# Patient Record
Sex: FEMALE | Race: WHITE | NOT HISPANIC OR LATINO | URBAN - METROPOLITAN AREA
[De-identification: names, ages, dates, MRNs, and addresses within clinical notes are randomized per-mention and may not be internally consistent; named-entity substitution may affect disease eponyms.]

---

## 2018-04-24 ENCOUNTER — INPATIENT (INPATIENT)
Facility: HOSPITAL | Age: 83
LOS: 4 days | Discharge: NURSING, REHAB OR LONG-TERM CARE | End: 2018-04-29
Attending: INTERNAL MEDICINE | Admitting: INTERNAL MEDICINE
Payer: MEDICARE

## 2018-04-24 VITALS
RESPIRATION RATE: 18 BRPM | HEART RATE: 76 BPM | SYSTOLIC BLOOD PRESSURE: 204 MMHG | DIASTOLIC BLOOD PRESSURE: 74 MMHG | OXYGEN SATURATION: 87 % | TEMPERATURE: 98 F

## 2018-04-24 DIAGNOSIS — Z98.890 OTHER SPECIFIED POSTPROCEDURAL STATES: Chronic | ICD-10-CM

## 2018-04-24 LAB
ALBUMIN SERPL ELPH-MCNC: 4.6 G/DL — SIGNIFICANT CHANGE UP (ref 3.5–5.2)
ALP SERPL-CCNC: 115 U/L — SIGNIFICANT CHANGE UP (ref 30–115)
ALT FLD-CCNC: 10 U/L — SIGNIFICANT CHANGE UP (ref 0–41)
ANION GAP SERPL CALC-SCNC: 17 MMOL/L — HIGH (ref 7–14)
APTT BLD: 34 SEC — SIGNIFICANT CHANGE UP (ref 27–39.2)
AST SERPL-CCNC: 13 U/L — SIGNIFICANT CHANGE UP (ref 0–41)
BASE EXCESS BLDV CALC-SCNC: 0.2 MMOL/L — SIGNIFICANT CHANGE UP (ref -2–2)
BASOPHILS # BLD AUTO: 0.02 K/UL — SIGNIFICANT CHANGE UP (ref 0–0.2)
BASOPHILS NFR BLD AUTO: 0.2 % — SIGNIFICANT CHANGE UP (ref 0–1)
BILIRUB SERPL-MCNC: 0.5 MG/DL — SIGNIFICANT CHANGE UP (ref 0.2–1.2)
BUN SERPL-MCNC: 20 MG/DL — SIGNIFICANT CHANGE UP (ref 10–20)
CALCIUM SERPL-MCNC: 9.9 MG/DL — SIGNIFICANT CHANGE UP (ref 8.5–10.1)
CHLORIDE SERPL-SCNC: 99 MMOL/L — SIGNIFICANT CHANGE UP (ref 98–110)
CK SERPL-CCNC: 44 U/L — SIGNIFICANT CHANGE UP (ref 0–225)
CO2 SERPL-SCNC: 23 MMOL/L — SIGNIFICANT CHANGE UP (ref 17–32)
CREAT SERPL-MCNC: 0.6 MG/DL — LOW (ref 0.7–1.5)
EOSINOPHIL # BLD AUTO: 0.05 K/UL — SIGNIFICANT CHANGE UP (ref 0–0.7)
EOSINOPHIL NFR BLD AUTO: 0.5 % — SIGNIFICANT CHANGE UP (ref 0–8)
GLUCOSE SERPL-MCNC: 229 MG/DL — HIGH (ref 70–99)
HCO3 BLDV-SCNC: 26 MMOL/L — SIGNIFICANT CHANGE UP (ref 22–29)
HCT VFR BLD CALC: 42.2 % — SIGNIFICANT CHANGE UP (ref 37–47)
HGB BLD-MCNC: 13.8 G/DL — SIGNIFICANT CHANGE UP (ref 12–16)
HOROWITZ INDEX BLDV+IHG-RTO: 21 — SIGNIFICANT CHANGE UP
IMM GRANULOCYTES NFR BLD AUTO: 1.1 % — HIGH (ref 0.1–0.3)
INR BLD: 1.43 RATIO — HIGH (ref 0.65–1.3)
LACTATE BLDV-MCNC: 2.5 MMOL/L — HIGH (ref 0.5–1.6)
LYMPHOCYTES # BLD AUTO: 1.05 K/UL — LOW (ref 1.2–3.4)
LYMPHOCYTES # BLD AUTO: 11.1 % — LOW (ref 20.5–51.1)
MCHC RBC-ENTMCNC: 30.8 PG — SIGNIFICANT CHANGE UP (ref 27–31)
MCHC RBC-ENTMCNC: 32.7 G/DL — SIGNIFICANT CHANGE UP (ref 32–37)
MCV RBC AUTO: 94.2 FL — SIGNIFICANT CHANGE UP (ref 81–99)
MONOCYTES # BLD AUTO: 0.87 K/UL — HIGH (ref 0.1–0.6)
MONOCYTES NFR BLD AUTO: 9.2 % — SIGNIFICANT CHANGE UP (ref 1.7–9.3)
NEUTROPHILS # BLD AUTO: 7.41 K/UL — HIGH (ref 1.4–6.5)
NEUTROPHILS NFR BLD AUTO: 77.9 % — HIGH (ref 42.2–75.2)
NRBC # BLD: 0 /100 WBCS — SIGNIFICANT CHANGE UP (ref 0–0)
NT-PROBNP SERPL-SCNC: 353 PG/ML — HIGH (ref 0–300)
PCO2 BLDV: 46 MMHG — SIGNIFICANT CHANGE UP (ref 41–51)
PH BLDV: 7.37 — SIGNIFICANT CHANGE UP (ref 7.26–7.43)
PLATELET # BLD AUTO: 274 K/UL — SIGNIFICANT CHANGE UP (ref 130–400)
PO2 BLDV: 32 MMHG — SIGNIFICANT CHANGE UP (ref 20–40)
POTASSIUM SERPL-MCNC: 5 MMOL/L — SIGNIFICANT CHANGE UP (ref 3.5–5)
POTASSIUM SERPL-SCNC: 5 MMOL/L — SIGNIFICANT CHANGE UP (ref 3.5–5)
PROT SERPL-MCNC: 7 G/DL — SIGNIFICANT CHANGE UP (ref 6–8)
PROTHROM AB SERPL-ACNC: 15.7 SEC — HIGH (ref 9.95–12.87)
RBC # BLD: 4.48 M/UL — SIGNIFICANT CHANGE UP (ref 4.2–5.4)
RBC # FLD: 13.2 % — SIGNIFICANT CHANGE UP (ref 11.5–14.5)
SAO2 % BLDV: 57 % — SIGNIFICANT CHANGE UP
SODIUM SERPL-SCNC: 139 MMOL/L — SIGNIFICANT CHANGE UP (ref 135–146)
TROPONIN T SERPL-MCNC: <0.01 NG/ML — SIGNIFICANT CHANGE UP
WBC # BLD: 9.5 K/UL — SIGNIFICANT CHANGE UP (ref 4.8–10.8)
WBC # FLD AUTO: 9.5 K/UL — SIGNIFICANT CHANGE UP (ref 4.8–10.8)

## 2018-04-24 PROCEDURE — 93970 EXTREMITY STUDY: CPT | Mod: 26

## 2018-04-24 RX ORDER — MORPHINE SULFATE 50 MG/1
2 CAPSULE, EXTENDED RELEASE ORAL ONCE
Qty: 0 | Refills: 0 | Status: DISCONTINUED | OUTPATIENT
Start: 2018-04-24 | End: 2018-04-24

## 2018-04-24 RX ORDER — SODIUM CHLORIDE 9 MG/ML
3 INJECTION INTRAMUSCULAR; INTRAVENOUS; SUBCUTANEOUS ONCE
Qty: 0 | Refills: 0 | Status: COMPLETED | OUTPATIENT
Start: 2018-04-24 | End: 2018-04-24

## 2018-04-24 RX ADMIN — SODIUM CHLORIDE 3 MILLILITER(S): 9 INJECTION INTRAMUSCULAR; INTRAVENOUS; SUBCUTANEOUS at 19:28

## 2018-04-24 RX ADMIN — MORPHINE SULFATE 2 MILLIGRAM(S): 50 CAPSULE, EXTENDED RELEASE ORAL at 19:47

## 2018-04-24 NOTE — ED PROVIDER NOTE - PROGRESS NOTE DETAILS
signed out to Dr Hill, f/u CT scan and dispo Pt  unable to walk - CT hip negative for fracture -  vance dmit for rehab -

## 2018-04-24 NOTE — ED PROVIDER NOTE - PHYSICAL EXAMINATION
Vital Signs: I have reviewed the initial vital signs.  Constitutional: well-nourished, no acute distress, normocephalic  Eyes: PERRLA, EOMI, no nystagmus, clear conjunctiva  ENT: MMM, TM b/l clear , no nasal congestion  Cardiovascular: Tachycardic, regular rhythm, no murmur appreciated  Respiratory: tachypneic   Gastrointestinal: soft, non-tender, non-distended  abdomen, no pulsatile mass  Musculoskeletal: supple neck, no lower extremity edema, no bony tenderness  Integumentary: warm, dry, no rash  Neurologic: awake, alert, cranial nerves II-XII grossly intact, extremities’ motor and sensory functions grossly intact, no focal deficits, GCS 15  Psychiatric: appropriate mood, appropriate affect Vital Signs: I have reviewed the initial vital signs.  Constitutional: well-nourished, no acute distress, normocephalic  Eyes: PERRLA, EOMI, no nystagmus, clear conjunctiva  ENT: MMM, TM b/l clear , no nasal congestion  Cardiovascular: Tachycardic, regular rhythm, no murmur appreciated  Respiratory: tachypneic   Gastrointestinal: soft, non-tender, non-distended  abdomen, no pulsatile mass  Musculoskeletal: supple neck, no lower extremity edema, +tenderness to left hip with decreased rom / no crepitation / good peripheral pulses   Integumentary: warm, dry, no rash  Neurologic: awake, alert, cranial nerves II-XII grossly intact, extremities’ motor and sensory functions grossly intact, no focal deficits, GCS 15  Psychiatric: appropriate mood, appropriate affect

## 2018-04-24 NOTE — ED PROVIDER NOTE - CARE PLAN
Principal Discharge DX:	Fall  Secondary Diagnosis:	Hip pain, acute, left  Secondary Diagnosis:	SOB (shortness of breath)

## 2018-04-24 NOTE — ED PROVIDER NOTE - ATTENDING CONTRIBUTION TO CARE
89 yo F PMHx noted including left hip replacement, DM, HTN presents with c/o fall at home, c/o left leg pain and feeling SOB on arrival to ED,  did not hit head, no n/v, no CP,  PMD stopped Lasix last week. On exam pt in mild distress, AAO x 3, GCS 15, + tachypnea, + wheezing b/l R>L, abd is soft nt nd, + tender left hip with pain with ROM, + b/l LE edema left >right, + distal pulses

## 2018-04-24 NOTE — ED ADULT NURSE NOTE - OBJECTIVE STATEMENT
patient states she feel because she lost her balance while cleaning off her plate in her garbage. She turned around and lost her balance, Denies injury to head, NO LOC, fall was unwitnessed, patient called son with her cell phone and he assisted her off floor and called 911.

## 2018-04-24 NOTE — ED PROVIDER NOTE - OBJECTIVE STATEMENT
Pt comes in s/p fall. Pt states that while at home she lost her balance and fell, Pt now c/o left hip pain. Pt with a h/o a left hip replacement in the past. Pt states that Dr. Rubio also stopped her Lasix about1 week ago. Pt is also acutely SOB. Pt comes in s/p fall. Pt states that while at home she lost her balance and fell, Pt now c/o left hip pain. Pt with a h/o a left hip replacement in the past. Pt states that Dr. Rubio also stopped her Lasix about1 week ago. Pt is also acutely SOB. patient denies any  CP , neck or back pain or head injury

## 2018-04-24 NOTE — ED ADULT NURSE REASSESSMENT NOTE - NS ED NURSE REASSESS COMMENT FT1
Pt has been taken off BIPAP per MD.  She is comfortable at this time on RA with pulse ox of 95%.  Denies SOB.  Continuing to monitor.

## 2018-04-24 NOTE — ED PROVIDER NOTE - NS ED ROS FT
Review of Systems    Constitutional: (-) fever/ chills (-) weight loss  Eyes/ENT: (-) blurry vision, (-) epistaxis (-) sore throat (-) ear pain  Cardiovascular: (-) chest pain, (-) syncope (-) palpitations  Respiratory: (-) cough, (+) shortness of breath  Gastrointestinal: (-) vomiting, (-) diarrhea (-) abdominal pain  Musculoskeletal: (-) neck pain, (-) back pain, (+) joint pain (+) pedal edema   Integumentary: (-) rash, (-) swelling  Neurological: (-) headache, (-) altered mental status  Psychiatric: (-) hallucinations or depression   Allergic/Immunologic: (-) pruritus

## 2018-04-24 NOTE — ED PROVIDER NOTE - MEDICAL DECISION MAKING DETAILS
Pt given neb and placed on bipap with great improvement.  x rays reviewed and withut definite fracture.  Pt c/o persistent pain, will obtain Ct scan hip

## 2018-04-25 DIAGNOSIS — E11.9 TYPE 2 DIABETES MELLITUS WITHOUT COMPLICATIONS: ICD-10-CM
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
## 2018-04-25 DIAGNOSIS — R06.02 SHORTNESS OF BREATH: ICD-10-CM

## 2018-04-25 RX ORDER — ACETAMINOPHEN 500 MG
650 TABLET ORAL EVERY 6 HOURS
Qty: 0 | Refills: 0 | Status: DISCONTINUED | OUTPATIENT
Start: 2018-04-25 | End: 2018-04-29

## 2018-04-25 RX ORDER — METFORMIN HYDROCHLORIDE 850 MG/1
500 TABLET ORAL
Qty: 0 | Refills: 0 | Status: DISCONTINUED | OUTPATIENT
Start: 2018-04-25 | End: 2018-04-26

## 2018-04-25 RX ORDER — IPRATROPIUM/ALBUTEROL SULFATE 18-103MCG
3 AEROSOL WITH ADAPTER (GRAM) INHALATION EVERY 6 HOURS
Qty: 0 | Refills: 0 | Status: DISCONTINUED | OUTPATIENT
Start: 2018-04-25 | End: 2018-04-29

## 2018-04-25 RX ORDER — ENOXAPARIN SODIUM 100 MG/ML
30 INJECTION SUBCUTANEOUS EVERY 12 HOURS
Qty: 0 | Refills: 0 | Status: DISCONTINUED | OUTPATIENT
Start: 2018-04-25 | End: 2018-04-26

## 2018-04-25 RX ORDER — OXYCODONE HYDROCHLORIDE 5 MG/1
5 TABLET ORAL EVERY 4 HOURS
Qty: 0 | Refills: 0 | Status: DISCONTINUED | OUTPATIENT
Start: 2018-04-25 | End: 2018-04-29

## 2018-04-25 RX ADMIN — ENOXAPARIN SODIUM 30 MILLIGRAM(S): 100 INJECTION SUBCUTANEOUS at 17:38

## 2018-04-25 RX ADMIN — Medication 650 MILLIGRAM(S): at 15:07

## 2018-04-25 RX ADMIN — Medication 650 MILLIGRAM(S): at 08:35

## 2018-04-25 RX ADMIN — Medication 650 MILLIGRAM(S): at 14:37

## 2018-04-25 RX ADMIN — METFORMIN HYDROCHLORIDE 500 MILLIGRAM(S): 850 TABLET ORAL at 17:38

## 2018-04-25 RX ADMIN — METFORMIN HYDROCHLORIDE 500 MILLIGRAM(S): 850 TABLET ORAL at 06:35

## 2018-04-25 RX ADMIN — Medication 650 MILLIGRAM(S): at 09:07

## 2018-04-25 RX ADMIN — ENOXAPARIN SODIUM 30 MILLIGRAM(S): 100 INJECTION SUBCUTANEOUS at 06:35

## 2018-04-25 NOTE — CONSULT NOTE ADULT - ASSESSMENT
IMPRESSION: Rehab of 87 y/o  f  rehab  for  gait  dysfunction  lt  hip  pain oa     PRECAUTIONS: [  ] Cardiac  [  ] Respiratory  [  ] Seizures [  ] Contact Isolation  [  ] Droplet Isolation  [  ] Other    Weight Bearing Status:     RECOMMENDATION:    Out of Bed to Chair     DVT/Decubiti Prophylaxis    REHAB PLAN:     [x ] Bedside P/T 3-5 times a week   [   ]   Bedside O/T  2-3 times a week             [   ] No Rehab Therapy Indicated                   [   ]  Speech Therapy   Conditioning/ROM                                    ADL  Bed Mobility                                               Conditioning/ROM  Transfers                                                     Bed Mobility  Sitting /Standing Balance                         Transfers                                        Gait Training                                               Sitting/Standing Balance  Stair Training [   ]Applicable                    Home equipment Eval                                                                        Splinting  [   ] Only      GOALS:   ADL   [   x]   Independent                    Transfers  [  x ] Independent                          Ambulation  [   x] Independent     [  x  ] With device                            [   ]  CG                                                         [   ]  CG                                                                  [   ] CG                            [    ] Min A                                                   [   ] Min A                                                              [   ] Min  A          DISCHARGE PLAN:   [   ]  Good candidate for Intensive Rehabilitation/Hospital based-4A [facility]                                             Will tolerate 3hrs Intensive Rehab Daily                                       [  xx  ]  Short Term Rehab in Skilled Nursing Facility                                       [    ]  Home with Outpatient or VN services                                         [    ]  Possible Candidate for Intensive Hospital based Rehab
L superior ramus fx    wbat  rehab  pain control  fu with primary surgeon (kera) once discharged

## 2018-04-25 NOTE — PHYSICAL THERAPY INITIAL EVALUATION ADULT - SPECIFY REASON(S)
Chart reviewed. Patient currently pending orthopedic consult re: L hip hemiarthroplasty s/p fall p/w acute nondisplaced fx through left pubic body.  Will follow up and complete eval as appropriate

## 2018-04-25 NOTE — H&P ADULT - ASSESSMENT
89 yo F h/o Afib on Xarelto HTN DM high cholesterol s/p mechanical fall from standing, no injuries on xr or CT, unable ambulate    - admitted for Pt/Ot, rehab evaluation  - pain control  - DVT ppx       shortness of breath  - placed on bipap in ER, improved 89 yo F h/o Afib on Xarelto HTN DM high cholesterol s/p mechanical fall from standing, no injuries on xr, unable ambulate. CT noted for acute nondisplaced fracture through the left pubic body.     - admitted for Pt/Ot, rehab evaluation  - ortho consult   - pain control  - DVT ppx       shortness of breath  - placed on bipap in ER, improved

## 2018-04-25 NOTE — CONSULT NOTE ADULT - SUBJECTIVE AND OBJECTIVE BOX
HPI:  pt is an 87 yo F h/o Afib on Xarelto HTN DM high cholesterol s/p mechanical fall from standing. Pt states she lost her balance, No LOC or syncope, no head trauma. P/w L hip pain and inability to ambulate. Denies fever, chills, CP, N/V/D dysuria melena. Pt also short of breath at rest, received nebs in ER and placed on Bipap.     PTN  REFERRED TO ACUTE  REHAB  FOR  EVAL AND  TX   PAST MEDICAL & SURGICAL HISTORY:  High blood cholesterol  Hypertension  Diabetes mellitus, type 2  History of hip surgery      Hospital Course:    TODAY'S SUBJECTIVE & REVIEW OF SYMPTOMS:     Constitutional WNL   Cardio WNL   Resp WNL   GI WNL  Heme WNL  Endo WNL  Skin WNL  MSK WNL  Neuro WNL  Cognitive WNL  Psych WNL      MEDICATIONS  (STANDING):  enoxaparin Injectable 30 milliGRAM(s) SubCutaneous every 12 hours  metFORMIN 500 milliGRAM(s) Oral two times a day    MEDICATIONS  (PRN):  acetaminophen   Tablet. 650 milliGRAM(s) Oral every 6 hours PRN Mild Pain (1 - 3)  ALBUTerol/ipratropium for Nebulization 3 milliLiter(s) Nebulizer every 6 hours PRN Shortness of Breath and/or Wheezing  oxyCODONE    IR 5 milliGRAM(s) Oral every 4 hours PRN Moderate Pain (4 - 6)      FAMILY HISTORY:      Allergies    No Known Drug Allergies  Originally Entered as [RASH] reaction to [cashew nut] (Unknown)    Intolerances        SOCIAL HISTORY:    [  ] Etoh  [  ] Smoking  [  ] Substance abuse     Home Environment:  [  ] Home Alone  [  xx] Lives with Family SON  [  ] Home Health Aid    Dwelling:  [  ] Apartment  [ x Private House  [  ] Adult Home  [  ] Skilled Nursing Facility      [  ] Short Term  [  ] Long Term  [  ] Stairs    chair lefting    Elevator [  ]    FUNCTIONAL STATUS PTA: (Check all that apply)  Ambulation: [ x  ]Independent    [  ] Dependent     [  ] Non-Ambulatory  Assistive Device: [ x ] SA Cane  [  ]  Q Cane  [  x] Walker  [  ]  Wheelchair  ADL : [  x] Independent  [  ]  Dependent       Vital Signs Last 24 Hrs  T(C): 35.8 (25 Apr 2018 04:00), Max: 36.7 (25 Apr 2018 03:43)  T(F): 96.5 (25 Apr 2018 04:00), Max: 98 (25 Apr 2018 03:43)  HR: 72 (25 Apr 2018 04:00) (67 - 77)  BP: 129/58 (25 Apr 2018 04:00) (120/56 - 204/74)  BP(mean): --  RR: 18 (25 Apr 2018 04:00) (18 - 20)  SpO2: 96% (25 Apr 2018 08:38) (87% - 100%)      PHYSICAL EXAM: Alert & Oriented X3  GENERAL: NAD, well-groomed, well-developed  HEAD:  Atraumatic, Normocephalic  EYES: EOMI, PERRLA, conjunctiva and sclera clear  NECK: Supple, No JVD, Normal thyroid  CHEST/LUNG: Clear to percussion bilaterally; No rales, rhonchi, wheezing, or rubs  HEART: Regular rate and rhythm; No murmurs, rubs, or gallops  ABDOMEN: Soft, Nontender, Nondistended; Bowel sounds present  EXTREMITIES:  2+ Peripheral Pulses, No clubbing, cyanosis, or edema    NERVOUS SYSTEM:  Cranial Nerves 2-12 intact [x] Abnormal  [  ]  ROM: WFL all extremities [ x ]  Abnormal [  ]  Motor Strength: WFL all extremities  [  ]  Abnormal [ 3-4/5  all  ext  ]  Sensation: intact to light touch [ x ] Abnormal [  ]  Reflexes: Symmetric [ x ]  Abnormal [  ]    FUNCTIONAL STATUS:  Bed Mobility: Independent [  ]  Supervision [  ]  Needs Assistance [ x ]  N/A [  ]  Transfers: Independent [  ]  Supervision [  ]  Needs Assistance [  x]  N/A [  ]   Ambulation: Independent [  ]  Supervision [  ]  Needs Assistance [ x]  N/A [  ]  ADL: Independent [ x ] Requires Assistance [  ] N/A [  ]      LABS:                        13.8   9.50  )-----------( 274      ( 24 Apr 2018 18:30 )             42.2     04-24    139  |  99  |  20  ----------------------------<  229<H>  5.0   |  23  |  0.6<L>    Ca    9.9      24 Apr 2018 18:30    TPro  7.0  /  Alb  4.6  /  TBili  0.5  /  DBili  x   /  AST  13  /  ALT  10  /  AlkPhos  115  04-24    PT/INR - ( 24 Apr 2018 18:30 )   PT: 15.70 sec;   INR: 1.43 ratio         PTT - ( 24 Apr 2018 18:30 )  PTT:34.0 sec      RADIOLOGY & ADDITIONAL STUDIES:< from: Xray Pelvis AP only (04.24.18 @ 19:32) >  Bonesare osteopenic. No evidence of acute fracture. Post left hip   arthroplasty.    Vascular calcification.    Degenerative changes of the visualized lower spine.    IMPRESSION:  Osteopenia, without evidence of acute fracture.          < end of copied text >      Assesment:
89 yo f s/p fall complains of l hip pain s/p hemiarthroplasty in the past.  no other complaints.  pain well controlled.    PAST MEDICAL & SURGICAL HISTORY:  High blood cholesterol  Hypertension  Diabetes mellitus, type 2  History of hip surgery    MEDICATIONS  (STANDING):  enoxaparin Injectable 30 milliGRAM(s) SubCutaneous every 12 hours  metFORMIN 500 milliGRAM(s) Oral two times a day    MEDICATIONS  (PRN):  acetaminophen   Tablet. 650 milliGRAM(s) Oral every 6 hours PRN Mild Pain (1 - 3)  ALBUTerol/ipratropium for Nebulization 3 milliLiter(s) Nebulizer every 6 hours PRN Shortness of Breath and/or Wheezing  oxyCODONE    IR 5 milliGRAM(s) Oral every 4 hours PRN Moderate Pain (4 - 6)    Allergies    No Known Drug Allergies  Originally Entered as [RASH] reaction to [cashew nut] (Unknown)    pe:  avss, lying in bed comfortably, eating lunch  lle: nttp gt, groin, no pain over pubic symphysis, no pain with log rolling, neg slr, no pain with axial loading, decreased rom lle 2/2 pain and weakness, nvid    x ray: nondisplaced l superior ramus fx, fx at gt, nondisplaced, hardware in place, no lucency    ct scan: l superior ramus fx

## 2018-04-25 NOTE — H&P ADULT - NSHPLABSRESULTS_GEN_ALL_CORE
CBC                   13.8   9.50  )-----------( 274      ( 24 Apr 2018 18:30 )             42.2     CMP  04-24    139  |  99  |  20  ----------------------------<  229<H>  5.0   |  23  |  0.6<L>    Ca    9.9      24 Apr 2018 18:30    TPro  7.0  /  Alb  4.6  /  TBili  0.5  /  DBili  x   /  AST  13  /  ALT  10  /  AlkPhos  115  04-24    XR L hip: no fx   CT pelvis: no fx CBC                   13.8   9.50  )-----------( 274      ( 24 Apr 2018 18:30 )             42.2     CMP  04-24    139  |  99  |  20  ----------------------------<  229<H>  5.0   |  23  |  0.6<L>    Ca    9.9      24 Apr 2018 18:30    TPro  7.0  /  Alb  4.6  /  TBili  0.5  /  DBili  x   /  AST  13  /  ALT  10  /  AlkPhos  115  04-24    XR L hip: no fx       CT pelvis: 1. Acute nondisplaced fracture through the left pubic body.  2. Post left hip hemiarthroplasty.

## 2018-04-25 NOTE — H&P ADULT - NSHPPHYSICALEXAM_GEN_ALL_CORE
ICU Vital Signs Last 24 Hrs  T(C): 36.7 (25 Apr 2018 03:43), Max: 36.7 (25 Apr 2018 03:43)  T(F): 98 (25 Apr 2018 03:43), Max: 98 (25 Apr 2018 03:43)  HR: 75 (25 Apr 2018 03:43) (67 - 77)  BP: 120/56 (25 Apr 2018 03:43) (120/56 - 204/74)  BP(mean): --  ABP: --  ABP(mean): --  RR: 20 (25 Apr 2018 03:43) (18 - 20)  SpO2: 100% (25 Apr 2018 03:43) (87% - 100%)      GEN elderly female, non toxic   Eyes: PERRLA, EOMI, no nystagmus, clear conjunctiva  ENT: MMM, TM b/l clear , no nasal congestion  Cardiovascular: Tachycardic, regular rhythm, no murmur appreciated  Respiratory: tachypneic, no wheeze   Gastrointestinal: soft, non-tender, non-distended  abdomen, no pulsatile mass  Musculoskeletal: supple neck, no lower extremity edema, +tenderness to left hip with decreased rom / no crepitation / good peripheral pulses   Integumentary: warm, dry, no rash

## 2018-04-25 NOTE — H&P ADULT - HISTORY OF PRESENT ILLNESS
pt is an 89 yo F h/o Afib on Xarelto HTN DM high cholesterol s/p mechanical fall from standing. Pt states she lost her balance, No LOC or syncope, no head trauma. P/w L hip pain and inability to ambulate. Denies fever, chills, CP, N/V/D dysuria melena. Pt also short of breath at rest, received nebs in ER and placed on Bipap.

## 2018-04-25 NOTE — PROVIDER CONTACT NOTE (MEDICATION) - SITUATION
patient states she takes about 6 medications that are not ordered & that she does not know the names of the medications & requested I ask Dr Rubio to order them because "he knows what she takes"

## 2018-04-26 LAB
HCT VFR BLD CALC: 34.1 % — LOW (ref 37–47)
HGB BLD-MCNC: 11.2 G/DL — LOW (ref 12–16)
MCHC RBC-ENTMCNC: 31 PG — SIGNIFICANT CHANGE UP (ref 27–31)
MCHC RBC-ENTMCNC: 32.8 G/DL — SIGNIFICANT CHANGE UP (ref 32–37)
MCV RBC AUTO: 94.5 FL — SIGNIFICANT CHANGE UP (ref 81–99)
NRBC # BLD: 0 /100 WBCS — SIGNIFICANT CHANGE UP (ref 0–0)
PLATELET # BLD AUTO: 199 K/UL — SIGNIFICANT CHANGE UP (ref 130–400)
RBC # BLD: 3.61 M/UL — LOW (ref 4.2–5.4)
RBC # FLD: 12.9 % — SIGNIFICANT CHANGE UP (ref 11.5–14.5)
WBC # BLD: 9.32 K/UL — SIGNIFICANT CHANGE UP (ref 4.8–10.8)
WBC # FLD AUTO: 9.32 K/UL — SIGNIFICANT CHANGE UP (ref 4.8–10.8)

## 2018-04-26 RX ORDER — FUROSEMIDE 40 MG
40 TABLET ORAL DAILY
Qty: 0 | Refills: 0 | Status: DISCONTINUED | OUTPATIENT
Start: 2018-04-26 | End: 2018-04-29

## 2018-04-26 RX ORDER — PANTOPRAZOLE SODIUM 20 MG/1
40 TABLET, DELAYED RELEASE ORAL
Qty: 0 | Refills: 0 | Status: DISCONTINUED | OUTPATIENT
Start: 2018-04-26 | End: 2018-04-29

## 2018-04-26 RX ORDER — METOPROLOL TARTRATE 50 MG
50 TABLET ORAL
Qty: 0 | Refills: 0 | Status: DISCONTINUED | OUTPATIENT
Start: 2018-04-26 | End: 2018-04-29

## 2018-04-26 RX ORDER — DILTIAZEM HCL 120 MG
240 CAPSULE, EXT RELEASE 24 HR ORAL DAILY
Qty: 0 | Refills: 0 | Status: DISCONTINUED | OUTPATIENT
Start: 2018-04-26 | End: 2018-04-29

## 2018-04-26 RX ORDER — PANTOPRAZOLE SODIUM 20 MG/1
1 TABLET, DELAYED RELEASE ORAL
Qty: 0 | Refills: 0 | COMMUNITY

## 2018-04-26 RX ORDER — SIMVASTATIN 20 MG/1
40 TABLET, FILM COATED ORAL AT BEDTIME
Qty: 0 | Refills: 0 | Status: DISCONTINUED | OUTPATIENT
Start: 2018-04-26 | End: 2018-04-29

## 2018-04-26 RX ORDER — RIVAROXABAN 15 MG-20MG
20 KIT ORAL EVERY 24 HOURS
Qty: 0 | Refills: 0 | Status: DISCONTINUED | OUTPATIENT
Start: 2018-04-26 | End: 2018-04-29

## 2018-04-26 RX ORDER — METFORMIN HYDROCHLORIDE 850 MG/1
850 TABLET ORAL
Qty: 0 | Refills: 0 | Status: DISCONTINUED | OUTPATIENT
Start: 2018-04-26 | End: 2018-04-29

## 2018-04-26 RX ADMIN — Medication 650 MILLIGRAM(S): at 13:20

## 2018-04-26 RX ADMIN — SIMVASTATIN 40 MILLIGRAM(S): 20 TABLET, FILM COATED ORAL at 21:26

## 2018-04-26 RX ADMIN — OXYCODONE HYDROCHLORIDE 5 MILLIGRAM(S): 5 TABLET ORAL at 06:16

## 2018-04-26 RX ADMIN — PANTOPRAZOLE SODIUM 40 MILLIGRAM(S): 20 TABLET, DELAYED RELEASE ORAL at 17:36

## 2018-04-26 RX ADMIN — Medication 50 MILLIGRAM(S): at 17:36

## 2018-04-26 RX ADMIN — METFORMIN HYDROCHLORIDE 850 MILLIGRAM(S): 850 TABLET ORAL at 17:36

## 2018-04-26 RX ADMIN — Medication 240 MILLIGRAM(S): at 17:37

## 2018-04-26 RX ADMIN — OXYCODONE HYDROCHLORIDE 5 MILLIGRAM(S): 5 TABLET ORAL at 06:20

## 2018-04-26 RX ADMIN — OXYCODONE HYDROCHLORIDE 5 MILLIGRAM(S): 5 TABLET ORAL at 04:08

## 2018-04-26 RX ADMIN — ENOXAPARIN SODIUM 30 MILLIGRAM(S): 100 INJECTION SUBCUTANEOUS at 05:01

## 2018-04-26 RX ADMIN — OXYCODONE HYDROCHLORIDE 5 MILLIGRAM(S): 5 TABLET ORAL at 01:26

## 2018-04-26 RX ADMIN — RIVAROXABAN 20 MILLIGRAM(S): KIT at 17:36

## 2018-04-26 RX ADMIN — OXYCODONE HYDROCHLORIDE 5 MILLIGRAM(S): 5 TABLET ORAL at 19:42

## 2018-04-26 RX ADMIN — Medication 650 MILLIGRAM(S): at 12:55

## 2018-04-26 RX ADMIN — METFORMIN HYDROCHLORIDE 500 MILLIGRAM(S): 850 TABLET ORAL at 05:01

## 2018-04-26 RX ADMIN — OXYCODONE HYDROCHLORIDE 5 MILLIGRAM(S): 5 TABLET ORAL at 20:12

## 2018-04-26 RX ADMIN — Medication 40 MILLIGRAM(S): at 17:36

## 2018-04-26 NOTE — PHYSICAL THERAPY INITIAL EVALUATION ADULT - IMPAIRMENTS CONTRIBUTING TO GAIT DEVIATIONS, PT EVAL
decreased strength/narrow base of support/pain/impaired postural control/impaired balance/decreased endurance

## 2018-04-26 NOTE — PHYSICAL THERAPY INITIAL EVALUATION ADULT - GAIT DEVIATIONS NOTED, PT EVAL
decreased weight-shifting ability/decreased rayne/decreased step length/stooped posture, dec heel strike/pushoff /stance charity LLE

## 2018-04-26 NOTE — PHYSICAL THERAPY INITIAL EVALUATION ADULT - IMPAIRED TRANSFERS: SIT/STAND, REHAB EVAL
decreased strength/impaired balance/narrow base of support/impaired postural control/pain/decreased endurance

## 2018-04-26 NOTE — PHYSICAL THERAPY INITIAL EVALUATION ADULT - MANUAL MUSCLE TESTING RESULTS, REHAB EVAL
Both upper extremites/Both lower extremities muscle strength grossly graded  3 to 3+/5 except for (L) hip/knee/elbow 2+ to 3-/5

## 2018-04-26 NOTE — PHYSICAL THERAPY INITIAL EVALUATION ADULT - GENERAL OBSERVATIONS, REHAB EVAL
08:20-08:45 Chart reviewed. Pt encountered semireclined in bed,  may be seen by Physical Therapist as confirmed with Nurse. Patient denied pain at rest but "moderate" pain on (L) elbow/hip with activity but ready to get up now; +swelling/bruising (L) elbow

## 2018-04-26 NOTE — PHYSICAL THERAPY INITIAL EVALUATION ADULT - LEVEL OF INDEPENDENCE: SUPINE/SIT, REHAB EVAL
Detail Level: Zone
Plan: Patient brings photo c/w asteatotic dermatitis - resolved at present.  GDSC reviewed. RTC prn recurrent s/sx
Otc Regimen: CeraVe cream discussed
moderate assist (50% patients effort)

## 2018-04-26 NOTE — PHYSICAL THERAPY INITIAL EVALUATION ADULT - ACTIVE RANGE OF MOTION EXAMINATION, REHAB EVAL
Both upper extremities/Both lower extremities joints within functional limits and painfree  AAROM/AROM

## 2018-04-27 LAB
ALBUMIN SERPL ELPH-MCNC: 4.2 G/DL — SIGNIFICANT CHANGE UP (ref 3.5–5.2)
ALP SERPL-CCNC: 95 U/L — SIGNIFICANT CHANGE UP (ref 30–115)
ALT FLD-CCNC: 9 U/L — SIGNIFICANT CHANGE UP (ref 0–41)
AMMONIA BLD-MCNC: 30 UMOL/L — SIGNIFICANT CHANGE UP (ref 11–55)
ANION GAP SERPL CALC-SCNC: 17 MMOL/L — HIGH (ref 7–14)
APPEARANCE UR: CLEAR — SIGNIFICANT CHANGE UP
AST SERPL-CCNC: 12 U/L — SIGNIFICANT CHANGE UP (ref 0–41)
BILIRUB SERPL-MCNC: 0.6 MG/DL — SIGNIFICANT CHANGE UP (ref 0.2–1.2)
BILIRUB UR-MCNC: NEGATIVE — SIGNIFICANT CHANGE UP
BUN SERPL-MCNC: 18 MG/DL — SIGNIFICANT CHANGE UP (ref 10–20)
CALCIUM SERPL-MCNC: 8.9 MG/DL — SIGNIFICANT CHANGE UP (ref 8.5–10.1)
CHLORIDE SERPL-SCNC: 93 MMOL/L — LOW (ref 98–110)
CK SERPL-CCNC: 26 U/L — SIGNIFICANT CHANGE UP (ref 0–225)
CO2 SERPL-SCNC: 25 MMOL/L — SIGNIFICANT CHANGE UP (ref 17–32)
COLOR SPEC: YELLOW — SIGNIFICANT CHANGE UP
CREAT SERPL-MCNC: 0.6 MG/DL — LOW (ref 0.7–1.5)
DIFF PNL FLD: NEGATIVE — SIGNIFICANT CHANGE UP
GLUCOSE SERPL-MCNC: 218 MG/DL — HIGH (ref 70–99)
GLUCOSE UR QL: NEGATIVE MG/DL — SIGNIFICANT CHANGE UP
HCT VFR BLD CALC: 36.3 % — LOW (ref 37–47)
HGB BLD-MCNC: 12 G/DL — SIGNIFICANT CHANGE UP (ref 12–16)
KETONES UR-MCNC: NEGATIVE — SIGNIFICANT CHANGE UP
LEUKOCYTE ESTERASE UR-ACNC: (no result)
MCHC RBC-ENTMCNC: 30.9 PG — SIGNIFICANT CHANGE UP (ref 27–31)
MCHC RBC-ENTMCNC: 33.1 G/DL — SIGNIFICANT CHANGE UP (ref 32–37)
MCV RBC AUTO: 93.6 FL — SIGNIFICANT CHANGE UP (ref 81–99)
NITRITE UR-MCNC: POSITIVE
NRBC # BLD: 0 /100 WBCS — SIGNIFICANT CHANGE UP (ref 0–0)
NT-PROBNP SERPL-SCNC: 276 PG/ML — SIGNIFICANT CHANGE UP (ref 0–300)
PH UR: 5.5 — SIGNIFICANT CHANGE UP (ref 5–8)
PLATELET # BLD AUTO: 208 K/UL — SIGNIFICANT CHANGE UP (ref 130–400)
POTASSIUM SERPL-MCNC: 4.2 MMOL/L — SIGNIFICANT CHANGE UP (ref 3.5–5)
POTASSIUM SERPL-SCNC: 4.2 MMOL/L — SIGNIFICANT CHANGE UP (ref 3.5–5)
PROT SERPL-MCNC: 6.4 G/DL — SIGNIFICANT CHANGE UP (ref 6–8)
PROT UR-MCNC: NEGATIVE MG/DL — SIGNIFICANT CHANGE UP
RBC # BLD: 3.88 M/UL — LOW (ref 4.2–5.4)
RBC # FLD: 12.9 % — SIGNIFICANT CHANGE UP (ref 11.5–14.5)
SODIUM SERPL-SCNC: 135 MMOL/L — SIGNIFICANT CHANGE UP (ref 135–146)
SP GR SPEC: 1.02 — SIGNIFICANT CHANGE UP (ref 1.01–1.03)
TROPONIN T SERPL-MCNC: <0.01 NG/ML — SIGNIFICANT CHANGE UP
UROBILINOGEN FLD QL: 0.2 MG/DL — SIGNIFICANT CHANGE UP (ref 0.2–0.2)
WBC # BLD: 10.27 K/UL — SIGNIFICANT CHANGE UP (ref 4.8–10.8)
WBC # FLD AUTO: 10.27 K/UL — SIGNIFICANT CHANGE UP (ref 4.8–10.8)

## 2018-04-27 RX ORDER — DOCUSATE SODIUM 100 MG
100 CAPSULE ORAL THREE TIMES A DAY
Qty: 0 | Refills: 0 | Status: DISCONTINUED | OUTPATIENT
Start: 2018-04-27 | End: 2018-04-29

## 2018-04-27 RX ADMIN — OXYCODONE HYDROCHLORIDE 5 MILLIGRAM(S): 5 TABLET ORAL at 22:48

## 2018-04-27 RX ADMIN — Medication 40 MILLIGRAM(S): at 05:46

## 2018-04-27 RX ADMIN — Medication 650 MILLIGRAM(S): at 01:39

## 2018-04-27 RX ADMIN — RIVAROXABAN 20 MILLIGRAM(S): KIT at 16:40

## 2018-04-27 RX ADMIN — OXYCODONE HYDROCHLORIDE 5 MILLIGRAM(S): 5 TABLET ORAL at 22:18

## 2018-04-27 RX ADMIN — PANTOPRAZOLE SODIUM 40 MILLIGRAM(S): 20 TABLET, DELAYED RELEASE ORAL at 05:51

## 2018-04-27 RX ADMIN — Medication 650 MILLIGRAM(S): at 17:03

## 2018-04-27 RX ADMIN — Medication 50 MILLIGRAM(S): at 17:35

## 2018-04-27 RX ADMIN — Medication 650 MILLIGRAM(S): at 02:09

## 2018-04-27 RX ADMIN — Medication 650 MILLIGRAM(S): at 16:40

## 2018-04-27 RX ADMIN — METFORMIN HYDROCHLORIDE 850 MILLIGRAM(S): 850 TABLET ORAL at 05:47

## 2018-04-27 RX ADMIN — METFORMIN HYDROCHLORIDE 850 MILLIGRAM(S): 850 TABLET ORAL at 17:35

## 2018-04-27 RX ADMIN — SIMVASTATIN 40 MILLIGRAM(S): 20 TABLET, FILM COATED ORAL at 21:15

## 2018-04-27 NOTE — CHART NOTE - NSCHARTNOTEFT_GEN_A_CORE
medical PA notes  patient urine analysis in AM today is showing positive nitrate and moderate leukocytes   patient wbc 10.27 and temp 97.6  urine C& S SENT STAT .  case d/w Dr Marian GALVEZ and he agrees with the plan .

## 2018-04-27 NOTE — CHART NOTE - NSCHARTNOTEFT_GEN_A_CORE
rapid response called for dizziness, hypotension and tachycardia after pt had BM.  Pt was placed back in bed  alert, comfortable, mild distress  possible vasovagal episode  labs  monitor vss  monitor pt  awaits call back from PMD rapid response called for dizziness, hypotension and tachycardia after pt had BM.  Pt was placed back in bed  alert, comfortable, mild distress  possible vasovagal episode  labs  iv hydration  monitor vss  monitor pt  awaits call back from PMD

## 2018-04-27 NOTE — PROGRESS NOTE ADULT - SUBJECTIVE AND OBJECTIVE BOX
88y female admitted post fall with left pelvic fx ; episode of vasovagal post large bm ; noted earlier today     no feeling better     MEDICATIONS  (STANDING):  diltiazem    milliGRAM(s) Oral daily  furosemide    Tablet 40 milliGRAM(s) Oral daily  metFORMIN 850 milliGRAM(s) Oral two times a day  metoprolol tartrate 50 milliGRAM(s) Oral two times a day  pantoprazole    Tablet 40 milliGRAM(s) Oral before breakfast  rivaroxaban 20 milliGRAM(s) Oral every 24 hours  simvastatin 40 milliGRAM(s) Oral at bedtime    MEDICATIONS  (PRN):  acetaminophen   Tablet. 650 milliGRAM(s) Oral every 6 hours PRN Mild Pain (1 - 3)  ALBUTerol/ipratropium for Nebulization 3 milliLiter(s) Nebulizer every 6 hours PRN Shortness of Breath and/or Wheezing  oxyCODONE    IR 5 milliGRAM(s) Oral every 4 hours PRN Moderate Pain (4 - 6)    Vital Signs Last 24 Hrs  T(C): 36.2 (27 Apr 2018 05:15), Max: 36.7 (26 Apr 2018 22:33)  T(F): 97.1 (27 Apr 2018 05:15), Max: 98.1 (26 Apr 2018 22:33)  HR: 74 (27 Apr 2018 05:36) (72 - 106)  BP: 119/59 (27 Apr 2018 05:36) (114/52 - 147/66)  BP(mean): --  RR: 16 (27 Apr 2018 05:15) (16 - 16)  SpO2: 95% (27 Apr 2018 08:01) (95% - 95%)    LYing in bed feels better , NAD   lung cta   card s1 s2   abd soft + bs   ext no cyanosis     neuro alert awake oriented x3                           12.0   10.27 )-----------( 208      ( 27 Apr 2018 10:00 )             36.3   04-27    135  |  93<L>  |  18  ----------------------------<  218<H>  4.2   |  25  |  0.6<L>    Ca    8.9      27 Apr 2018 10:00    TPro  6.4  /  Alb  4.2  /  TBili  0.6  /  DBili  x   /  AST  12  /  ALT  9   /  AlkPhos  95  04-27
88y female admitted post fall with pelvis fx hx of hip fx , seen by rehab / ortho              Vital Signs Last 24 Hrs  T(C): 35.9 (26 Apr 2018 05:32), Max: 36.5 (25 Apr 2018 21:57)  T(F): 96.6 (26 Apr 2018 05:32), Max: 97.7 (25 Apr 2018 21:57)  HR: 88 (26 Apr 2018 05:32) (78 - 88)  BP: 137/63 (26 Apr 2018 05:32) (122/56 - 137/63)  BP(mean): --  RR: 16 (26 Apr 2018 05:32) (16 - 16)  SpO2: --    sitting up in chair in no acute distress   lungs: CTA   Card: S1 S2   ABD: soft + bs   ext: no cyanosis   neuro - alert awake  O x 3    04-24    139  |  99  |  20  ----------------------------<  229<H>  5.0   |  23  |  0.6<L>    Ca    9.9      24 Apr 2018 18:30    TPro  7.0  /  Alb  4.6  /  TBili  0.5  /  DBili  x   /  AST  13  /  ALT  10  /  AlkPhos  115  04-24                          11.2   9.32  )-----------( 199      ( 26 Apr 2018 06:00 )             34.1

## 2018-04-27 NOTE — PROGRESS NOTE ADULT - ASSESSMENT
Fall with fx - WBAT - for pt/ot str in am   Cardiac - stable labs noted continue with medsication    vasovagal bm , had no bm for 3 days add colace
Fall Hip FX / pelvis fx     pain control / pt/ot   - for therapy     STR    AFIB stable   DM stable

## 2018-04-28 LAB
CULTURE RESULTS: SIGNIFICANT CHANGE UP
SPECIMEN SOURCE: SIGNIFICANT CHANGE UP

## 2018-04-28 RX ORDER — ACETAMINOPHEN 500 MG
2 TABLET ORAL
Qty: 0 | Refills: 0 | COMMUNITY
Start: 2018-04-28

## 2018-04-28 RX ORDER — OXYCODONE HYDROCHLORIDE 5 MG/1
1 TABLET ORAL
Qty: 0 | Refills: 0 | COMMUNITY
Start: 2018-04-28

## 2018-04-28 RX ORDER — DOCUSATE SODIUM 100 MG
1 CAPSULE ORAL
Qty: 0 | Refills: 0 | DISCHARGE
Start: 2018-04-28

## 2018-04-28 RX ORDER — RIVAROXABAN 15 MG-20MG
0 KIT ORAL
Qty: 0 | Refills: 0 | COMMUNITY

## 2018-04-28 RX ORDER — RIVAROXABAN 15 MG-20MG
1 KIT ORAL
Qty: 0 | Refills: 0 | COMMUNITY

## 2018-04-28 RX ORDER — RIVAROXABAN 15 MG-20MG
1 KIT ORAL
Qty: 0 | Refills: 0 | DISCHARGE
Start: 2018-04-28

## 2018-04-28 RX ORDER — GLIMEPIRIDE 1 MG
0 TABLET ORAL
Qty: 0 | Refills: 0 | COMMUNITY

## 2018-04-28 RX ORDER — CIPROFLOXACIN LACTATE 400MG/40ML
500 VIAL (ML) INTRAVENOUS EVERY 12 HOURS
Qty: 0 | Refills: 0 | Status: DISCONTINUED | OUTPATIENT
Start: 2018-04-28 | End: 2018-04-29

## 2018-04-28 RX ORDER — CIPROFLOXACIN LACTATE 400MG/40ML
1 VIAL (ML) INTRAVENOUS
Qty: 0 | Refills: 0 | COMMUNITY
Start: 2018-04-28

## 2018-04-28 RX ADMIN — OXYCODONE HYDROCHLORIDE 5 MILLIGRAM(S): 5 TABLET ORAL at 21:28

## 2018-04-28 RX ADMIN — Medication 50 MILLIGRAM(S): at 08:32

## 2018-04-28 RX ADMIN — Medication 40 MILLIGRAM(S): at 11:38

## 2018-04-28 RX ADMIN — Medication 50 MILLIGRAM(S): at 18:33

## 2018-04-28 RX ADMIN — Medication 650 MILLIGRAM(S): at 18:34

## 2018-04-28 RX ADMIN — PANTOPRAZOLE SODIUM 40 MILLIGRAM(S): 20 TABLET, DELAYED RELEASE ORAL at 06:05

## 2018-04-28 RX ADMIN — OXYCODONE HYDROCHLORIDE 5 MILLIGRAM(S): 5 TABLET ORAL at 23:46

## 2018-04-28 RX ADMIN — Medication 650 MILLIGRAM(S): at 19:13

## 2018-04-28 RX ADMIN — METFORMIN HYDROCHLORIDE 850 MILLIGRAM(S): 850 TABLET ORAL at 18:33

## 2018-04-28 RX ADMIN — METFORMIN HYDROCHLORIDE 850 MILLIGRAM(S): 850 TABLET ORAL at 06:05

## 2018-04-28 RX ADMIN — Medication 240 MILLIGRAM(S): at 06:05

## 2018-04-28 RX ADMIN — RIVAROXABAN 20 MILLIGRAM(S): KIT at 18:33

## 2018-04-28 RX ADMIN — SIMVASTATIN 40 MILLIGRAM(S): 20 TABLET, FILM COATED ORAL at 21:28

## 2018-04-28 RX ADMIN — Medication 500 MILLIGRAM(S): at 18:35

## 2018-04-28 NOTE — DISCHARGE NOTE ADULT - PATIENT PORTAL LINK FT
You can access the [facility], offered by [facility], by registering with the following website: [email or web address]

## 2018-04-28 NOTE — DISCHARGE NOTE ADULT - MEDICATION SUMMARY - MEDICATIONS TO TAKE
I will START or STAY ON the medications listed below when I get home from the hospital:    Actamin 325 mg oral tablet  -- 2 tab(s) by mouth every 6 hours, As needed, Mild Pain (1 - 3)  -- Indication: For Pain    oxyCODONE 5 mg oral tablet  -- 1 tab(s) by mouth every 4 hours, As needed, Moderate Pain (4 - 6)  -- Indication: For Pain    dilTIAZem  -- Indication: For afib    rivaroxaban 20 mg oral tablet  -- 1 tab(s) by mouth every 24 hours  -- Indication: For afib    metFORMIN  -- Indication: For Dm    glimepiride 2 mg oral tablet  -- 1 tab(s) by mouth once a day  -- Indication: For Dm    simvastatin  -- Indication: For cholesterol    Metoprolol Tartrate 50 mg oral tablet  -- 1 tab(s) by mouth 2 times a day  -- Indication: For Htn    Lasix 40 mg oral tablet  -- 1 tab(s) by mouth once a day  -- Indication: For Htn    docusate sodium 100 mg oral capsule  -- 1 cap(s) by mouth 3 times a day  -- Indication: For constipation    Protonix 40 mg oral delayed release tablet  -- 1 tab(s) by mouth once a day  -- Indication: For gerd    ciprofloxacin 500 mg oral tablet  -- 1 tab(s) by mouth every 12 hours  -- Indication: For uti

## 2018-04-28 NOTE — DISCHARGE NOTE ADULT - HOSPITAL COURSE
patient was admitted with fall and left pelvic fracture ; seen by ortho cleared for WBAT ; episode of constipation with vasovagal ; + u/a culture sent empiric abx

## 2018-04-28 NOTE — DISCHARGE NOTE ADULT - NS AS ACTIVITY OBS
Bathing allowed/Walking-Indoors allowed/Showering allowed/No Heavy lifting/straining/Walking-Outdoors allowed

## 2018-04-28 NOTE — DISCHARGE NOTE ADULT - CARE PROVIDER_API CALL
Yandel Rubio), Internal Medicine  [address] [facility]  [address] [place] [address]  Phone: [phone]  Fax: [phone]

## 2018-04-29 VITALS
DIASTOLIC BLOOD PRESSURE: 59 MMHG | SYSTOLIC BLOOD PRESSURE: 130 MMHG | RESPIRATION RATE: 16 BRPM | TEMPERATURE: 97 F | HEART RATE: 82 BPM

## 2018-04-29 RX ORDER — ONDANSETRON 8 MG/1
4 TABLET, FILM COATED ORAL ONCE
Qty: 0 | Refills: 0 | Status: COMPLETED | OUTPATIENT
Start: 2018-04-29 | End: 2018-04-29

## 2018-04-29 RX ADMIN — PANTOPRAZOLE SODIUM 40 MILLIGRAM(S): 20 TABLET, DELAYED RELEASE ORAL at 06:19

## 2018-04-29 RX ADMIN — Medication 50 MILLIGRAM(S): at 05:27

## 2018-04-29 RX ADMIN — Medication 40 MILLIGRAM(S): at 05:27

## 2018-04-29 RX ADMIN — METFORMIN HYDROCHLORIDE 850 MILLIGRAM(S): 850 TABLET ORAL at 05:26

## 2018-04-29 RX ADMIN — Medication 650 MILLIGRAM(S): at 04:18

## 2018-04-29 RX ADMIN — Medication 500 MILLIGRAM(S): at 05:27

## 2018-04-29 RX ADMIN — ONDANSETRON 4 MILLIGRAM(S): 8 TABLET, FILM COATED ORAL at 08:21

## 2018-04-29 RX ADMIN — Medication 240 MILLIGRAM(S): at 05:27

## 2018-04-29 RX ADMIN — Medication 650 MILLIGRAM(S): at 10:37

## 2018-04-29 RX ADMIN — Medication 650 MILLIGRAM(S): at 12:00
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
## 2018-05-08 DIAGNOSIS — I10 ESSENTIAL (PRIMARY) HYPERTENSION: ICD-10-CM

## 2018-05-08 DIAGNOSIS — Z79.01 LONG TERM (CURRENT) USE OF ANTICOAGULANTS: ICD-10-CM

## 2018-05-08 DIAGNOSIS — E78.00 PURE HYPERCHOLESTEROLEMIA, UNSPECIFIED: ICD-10-CM

## 2018-05-08 DIAGNOSIS — R06.02 SHORTNESS OF BREATH: ICD-10-CM

## 2018-05-08 DIAGNOSIS — K59.00 CONSTIPATION, UNSPECIFIED: ICD-10-CM

## 2018-05-08 DIAGNOSIS — S32.502A UNSPECIFIED FRACTURE OF LEFT PUBIS, INITIAL ENCOUNTER FOR CLOSED FRACTURE: ICD-10-CM

## 2018-05-08 DIAGNOSIS — E11.9 TYPE 2 DIABETES MELLITUS WITHOUT COMPLICATIONS: ICD-10-CM

## 2018-05-08 DIAGNOSIS — I48.91 UNSPECIFIED ATRIAL FIBRILLATION: ICD-10-CM
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
## 2018-05-08 DIAGNOSIS — R55 SYNCOPE AND COLLAPSE: ICD-10-CM
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
## 2018-05-08 DIAGNOSIS — Z79.84 LONG TERM (CURRENT) USE OF ORAL HYPOGLYCEMIC DRUGS: ICD-10-CM

## 2019-01-23 ENCOUNTER — EMERGENCY (EMERGENCY)
Facility: HOSPITAL | Age: 84
LOS: 0 days | Discharge: HOME | End: 2019-01-23
Attending: EMERGENCY MEDICINE

## 2019-01-23 VITALS
RESPIRATION RATE: 17 BRPM | OXYGEN SATURATION: 97 % | DIASTOLIC BLOOD PRESSURE: 77 MMHG | TEMPERATURE: 96 F | SYSTOLIC BLOOD PRESSURE: 188 MMHG | HEART RATE: 77 BPM

## 2019-01-23 VITALS
HEART RATE: 74 BPM | TEMPERATURE: 99 F | OXYGEN SATURATION: 98 % | RESPIRATION RATE: 18 BRPM | SYSTOLIC BLOOD PRESSURE: 163 MMHG | DIASTOLIC BLOOD PRESSURE: 80 MMHG

## 2019-01-23 DIAGNOSIS — Z02.9 ENCOUNTER FOR ADMINISTRATIVE EXAMINATIONS, UNSPECIFIED: ICD-10-CM

## 2019-01-23 DIAGNOSIS — Z98.890 OTHER SPECIFIED POSTPROCEDURAL STATES: Chronic | ICD-10-CM

## 2019-01-23 PROBLEM — I10 ESSENTIAL (PRIMARY) HYPERTENSION: Chronic | Status: ACTIVE | Noted: 2018-04-24

## 2019-01-23 PROBLEM — E78.00 PURE HYPERCHOLESTEROLEMIA, UNSPECIFIED: Chronic | Status: ACTIVE | Noted: 2018-04-24

## 2019-01-23 PROBLEM — E11.9 TYPE 2 DIABETES MELLITUS WITHOUT COMPLICATIONS: Chronic | Status: ACTIVE | Noted: 2018-04-24

## 2019-01-23 LAB
ALBUMIN SERPL ELPH-MCNC: 4.2 G/DL — SIGNIFICANT CHANGE UP (ref 3.5–5.2)
ALP SERPL-CCNC: 173 U/L — HIGH (ref 30–115)
ALT FLD-CCNC: 146 U/L — HIGH (ref 0–41)
ANION GAP SERPL CALC-SCNC: 15 MMOL/L — HIGH (ref 7–14)
APPEARANCE UR: CLEAR — SIGNIFICANT CHANGE UP
AST SERPL-CCNC: 102 U/L — HIGH (ref 0–41)
BACTERIA # UR AUTO: ABNORMAL
BASOPHILS # BLD AUTO: 0.01 K/UL — SIGNIFICANT CHANGE UP (ref 0–0.2)
BASOPHILS NFR BLD AUTO: 0.1 % — SIGNIFICANT CHANGE UP (ref 0–1)
BILIRUB DIRECT SERPL-MCNC: <0.2 MG/DL — SIGNIFICANT CHANGE UP (ref 0–0.2)
BILIRUB INDIRECT FLD-MCNC: >0.6 MG/DL — SIGNIFICANT CHANGE UP (ref 0.2–1.2)
BILIRUB SERPL-MCNC: 0.8 MG/DL — SIGNIFICANT CHANGE UP (ref 0.2–1.2)
BILIRUB UR-MCNC: NEGATIVE — SIGNIFICANT CHANGE UP
BUN SERPL-MCNC: 16 MG/DL — SIGNIFICANT CHANGE UP (ref 10–20)
CALCIUM SERPL-MCNC: 9.5 MG/DL — SIGNIFICANT CHANGE UP (ref 8.5–10.1)
CHLORIDE SERPL-SCNC: 99 MMOL/L — SIGNIFICANT CHANGE UP (ref 98–110)
CO2 SERPL-SCNC: 25 MMOL/L — SIGNIFICANT CHANGE UP (ref 17–32)
COLOR SPEC: YELLOW — SIGNIFICANT CHANGE UP
CREAT SERPL-MCNC: 0.8 MG/DL — SIGNIFICANT CHANGE UP (ref 0.7–1.5)
DIFF PNL FLD: ABNORMAL
EOSINOPHIL # BLD AUTO: 0.03 K/UL — SIGNIFICANT CHANGE UP (ref 0–0.7)
EOSINOPHIL NFR BLD AUTO: 0.3 % — SIGNIFICANT CHANGE UP (ref 0–8)
EPI CELLS # UR: ABNORMAL /HPF
GLUCOSE SERPL-MCNC: 158 MG/DL — HIGH (ref 70–99)
GLUCOSE UR QL: NEGATIVE — SIGNIFICANT CHANGE UP
HCT VFR BLD CALC: 39.9 % — SIGNIFICANT CHANGE UP (ref 37–47)
HGB BLD-MCNC: 13.3 G/DL — SIGNIFICANT CHANGE UP (ref 12–16)
IMM GRANULOCYTES NFR BLD AUTO: 0.6 % — HIGH (ref 0.1–0.3)
KETONES UR-MCNC: NEGATIVE — SIGNIFICANT CHANGE UP
LACTATE SERPL-SCNC: 1.7 MMOL/L — SIGNIFICANT CHANGE UP (ref 0.5–2.2)
LEUKOCYTE ESTERASE UR-ACNC: ABNORMAL
LIDOCAIN IGE QN: 30 U/L — SIGNIFICANT CHANGE UP (ref 7–60)
LYMPHOCYTES # BLD AUTO: 0.53 K/UL — LOW (ref 1.2–3.4)
LYMPHOCYTES # BLD AUTO: 4.6 % — LOW (ref 20.5–51.1)
MCHC RBC-ENTMCNC: 31.4 PG — HIGH (ref 27–31)
MCHC RBC-ENTMCNC: 33.3 G/DL — SIGNIFICANT CHANGE UP (ref 32–37)
MCV RBC AUTO: 94.3 FL — SIGNIFICANT CHANGE UP (ref 81–99)
MONOCYTES # BLD AUTO: 0.99 K/UL — HIGH (ref 0.1–0.6)
MONOCYTES NFR BLD AUTO: 8.5 % — SIGNIFICANT CHANGE UP (ref 1.7–9.3)
NEUTROPHILS # BLD AUTO: 9.97 K/UL — HIGH (ref 1.4–6.5)
NEUTROPHILS NFR BLD AUTO: 85.9 % — HIGH (ref 42.2–75.2)
NITRITE UR-MCNC: NEGATIVE — SIGNIFICANT CHANGE UP
NRBC # BLD: 0 /100 WBCS — SIGNIFICANT CHANGE UP (ref 0–0)
PH UR: 6 — SIGNIFICANT CHANGE UP (ref 5–8)
PLATELET # BLD AUTO: 211 K/UL — SIGNIFICANT CHANGE UP (ref 130–400)
POTASSIUM SERPL-MCNC: 5 MMOL/L — SIGNIFICANT CHANGE UP (ref 3.5–5)
POTASSIUM SERPL-SCNC: 5 MMOL/L — SIGNIFICANT CHANGE UP (ref 3.5–5)
PROT SERPL-MCNC: 6.6 G/DL — SIGNIFICANT CHANGE UP (ref 6–8)
PROT UR-MCNC: NEGATIVE — SIGNIFICANT CHANGE UP
RBC # BLD: 4.23 M/UL — SIGNIFICANT CHANGE UP (ref 4.2–5.4)
RBC # FLD: 12.8 % — SIGNIFICANT CHANGE UP (ref 11.5–14.5)
RBC CASTS # UR COMP ASSIST: ABNORMAL /HPF
SODIUM SERPL-SCNC: 139 MMOL/L — SIGNIFICANT CHANGE UP (ref 135–146)
SP GR SPEC: 1.02 — SIGNIFICANT CHANGE UP (ref 1.01–1.03)
UROBILINOGEN FLD QL: 0.2 — SIGNIFICANT CHANGE UP (ref 0.2–0.2)
WBC # BLD: 11.6 K/UL — HIGH (ref 4.8–10.8)
WBC # FLD AUTO: 11.6 K/UL — HIGH (ref 4.8–10.8)
WBC UR QL: ABNORMAL /HPF

## 2019-01-23 RX ORDER — FAMOTIDINE 10 MG/ML
20 INJECTION INTRAVENOUS ONCE
Qty: 0 | Refills: 0 | Status: COMPLETED | OUTPATIENT
Start: 2019-01-23 | End: 2019-01-23

## 2019-01-23 RX ORDER — METFORMIN HYDROCHLORIDE 850 MG/1
0 TABLET ORAL
Qty: 0 | Refills: 0 | COMMUNITY

## 2019-01-23 RX ORDER — DILTIAZEM HCL 120 MG
0 CAPSULE, EXT RELEASE 24 HR ORAL
Qty: 0 | Refills: 0 | COMMUNITY

## 2019-01-23 RX ORDER — SODIUM CHLORIDE 9 MG/ML
1000 INJECTION INTRAMUSCULAR; INTRAVENOUS; SUBCUTANEOUS
Qty: 0 | Refills: 0 | Status: DISCONTINUED | OUTPATIENT
Start: 2019-01-23 | End: 2019-01-23

## 2019-01-23 RX ORDER — FUROSEMIDE 40 MG
1 TABLET ORAL
Qty: 0 | Refills: 0 | COMMUNITY

## 2019-01-23 RX ORDER — ONDANSETRON 8 MG/1
1 TABLET, FILM COATED ORAL
Qty: 9 | Refills: 0 | OUTPATIENT
Start: 2019-01-23 | End: 2019-01-25

## 2019-01-23 RX ORDER — ONDANSETRON 8 MG/1
4 TABLET, FILM COATED ORAL ONCE
Qty: 0 | Refills: 0 | Status: COMPLETED | OUTPATIENT
Start: 2019-01-23 | End: 2019-01-23

## 2019-01-23 RX ORDER — SIMVASTATIN 20 MG/1
0 TABLET, FILM COATED ORAL
Qty: 0 | Refills: 0 | COMMUNITY

## 2019-01-23 RX ADMIN — FAMOTIDINE 20 MILLIGRAM(S): 10 INJECTION INTRAVENOUS at 11:06

## 2019-01-23 RX ADMIN — SODIUM CHLORIDE 150 MILLILITER(S): 9 INJECTION INTRAMUSCULAR; INTRAVENOUS; SUBCUTANEOUS at 12:27

## 2019-01-23 RX ADMIN — ONDANSETRON 4 MILLIGRAM(S): 8 TABLET, FILM COATED ORAL at 15:58

## 2019-01-23 NOTE — ED PROVIDER NOTE - NS ED ROS FT
Constitutional: no fever, chills, no recent weight loss, change in appetite or malaise  Cardiac: No chest pain, SOB or edema.  Respiratory: No cough or respiratory distress  GI: + n/v, epigastric pain. No diarrhea  : No dysuria, frequency, urgency or hematuria  MS: no pain to back or extremities, no loss of ROM, no weakness  Neuro: No headache or weakness. No LOC.  Except as documented in the HPI, all other systems are negative.

## 2019-01-23 NOTE — ED PROVIDER NOTE - CARE PROVIDER_API CALL
Yandel Rubio), Internal Medicine  [address] [facility]  [address] [place] [address]  Phone: [phone]  Fax: [phone]

## 2019-01-23 NOTE — ED ADULT NURSE NOTE - NSIMPLEMENTINTERV_GEN_ALL_ED
Implemented All Fall with Harm Risk Interventions:  [place] to call system. Call bell, personal items and telephone within reach. Instruct patient to call for assistance. Room bathroom lighting operational. Non-slip footwear when patient is off stretcher. Physically safe environment: no spills, clutter or unnecessary equipment. Stretcher in lowest position, wheels locked, appropriate side rails in place. Provide visual cue, wrist band, yellow gown, etc. Monitor gait and stability. Monitor for mental status changes and reorient to person, place, and time. Review medications for side effects contributing to fall risk. Reinforce activity limits and safety measures with patient and family. Provide visual clues: red socks.

## 2019-01-23 NOTE — ED ADULT NURSE NOTE - NS PRO AD BILL OF RIGHTS
Anesthesia Type: 1% lidocaine with epinephrine
Deep Sutures: 3-0 Polysorb
Purse String (Simple) Text: Given the location of the defect and the characteristics of the surrounding skin a purse string simple closure was deemed most appropriate. Undermining was performed circumferentially around the surgical defect. A purse string suture was then placed and tightened.
Purse String (Intermediate) Text: Given the location of the defect and the characteristics of the surrounding skin a pursestring intermediate closure was deemed most appropriate. Undermining was performed circumfirentially around the surgical defect. A purstring suture was then placed and tightened.
Path Notes (To The Dermatopathologist): Pre Op Size: 2.5 x 1.5cm\\nClosure size: 3.1cm \\nPlease check margins.
Secondary Defect Length (In Cm): 0
Complex Repair And Rhombic Flap Text: The defect edges were debeveled with a #15 scalpel blade. The primary defect was closed partially with a complex linear closure. Given the location of the remaining defect, shape of the defect and the proximity to free margins a rhombic flap was deemed most appropriate for complete closure of the defect. Using a sterile surgical marker, an appropriate advancement flap was drawn incorporating the defect and placing the expected incisions within the relaxed skin tension lines where possible. The area thus outlined was incised deep to adipose tissue with a #15 scalpel blade. The skin margins were undermined to an appropriate distance in all directions utilizing iris scissors.
Bill [id number] For Specimen Handling/Conveyance To Laboratory?: no
O-Z Plasty Text: The defect edges were debeveled with a #15 scalpel blade. Given the location of the defect, shape of the defect and the proximity to free margins an O-Z plasty (double transposition flap) was deemed most appropriate. Using a sterile surgical marker, the appropriate transposition flaps were drawn incorporating the defect and placing the expected incisions within the relaxed skin tension lines where possible. The area thus outlined was incised deep to adipose tissue with a #15 scalpel blade. The skin margins were undermined to an appropriate distance in all directions utilizing iris scissors. Hemostasis was achieved with electrocautery. The flaps were then transposed into place, one clockwise and the other counterclockwise, and anchored with interrupted buried subcutaneous sutures.
Dermal Autograft Text: The defect edges were debeveled with a #15 scalpel blade. Given the location of the defect, shape of the defect and the proximity to free margins a dermal autograft was deemed most appropriate. Using a sterile surgical marker, the primary defect shape was transferred to the donor site. The area thus outlined was incised deep to adipose tissue with a #15 scalpel blade. The harvested graft was then trimmed of adipose and epidermal tissue until only dermis was left. The skin graft was then placed in the primary defect and oriented appropriately.
Xenograft Text: The defect edges were debeveled with a #15 scalpel blade. Given the location of the defect, shape of the defect and the proximity to free margins a xenograft was deemed most appropriate. The graft was then trimmed to fit the size of the defect. The graft was then placed in the primary defect and oriented appropriately.
Anesthesia Volume In Cc: 0.8
Complex Repair And O-T Advancement Flap Text: The defect edges were debeveled with a #15 scalpel blade. The primary defect was closed partially with a complex linear closure. Given the location of the remaining defect, shape of the defect and the proximity to free margins an O-T advancement flap was deemed most appropriate for complete closure of the defect. Using a sterile surgical marker, an appropriate advancement flap was drawn incorporating the defect and placing the expected incisions within the relaxed skin tension lines where possible. The area thus outlined was incised deep to adipose tissue with a #15 scalpel blade. The skin margins were undermined to an appropriate distance in all directions utilizing iris scissors.
O-L Flap Text: The defect edges were debeveled with a #15 scalpel blade. Given the location of the defect, shape of the defect and the proximity to free margins an O-L flap was deemed most appropriate. Using a sterile surgical marker, an appropriate advancement flap was drawn incorporating the defect and placing the expected incisions within the relaxed skin tension lines where possible. The area thus outlined was incised deep to adipose tissue with a #15 scalpel blade. The skin margins were undermined to an appropriate distance in all directions utilizing iris scissors.
Cartilage Graft Text: The defect edges were debeveled with a #15 scalpel blade. Given the location of the defect, shape of the defect, the fact the defect involved a full thickness cartilage defect a cartilage graft was deemed most appropriate. An appropriate donor site was identified, cleansed, and anesthetized. The cartilage graft was then harvested and transferred to the recipient site, oriented appropriately and then sutured into place. The secondary defect was then repaired using a primary closure.
Complex Repair And A-T Advancement Flap Text: The defect edges were debeveled with a #15 scalpel blade. The primary defect was closed partially with a complex linear closure. Given the location of the remaining defect, shape of the defect and the proximity to free margins an A-T advancement flap was deemed most appropriate for complete closure of the defect. Using a sterile surgical marker, an appropriate advancement flap was drawn incorporating the defect and placing the expected incisions within the relaxed skin tension lines where possible. The area thus outlined was incised deep to adipose tissue with a #15 scalpel blade. The skin margins were undermined to an appropriate distance in all directions utilizing iris scissors.
Complex Repair And Melolabial Flap Text: The defect edges were debeveled with a #15 scalpel blade. The primary defect was closed partially with a complex linear closure. Given the location of the remaining defect, shape of the defect and the proximity to free margins a melolabial flap was deemed most appropriate for complete closure of the defect. Using a sterile surgical marker, an appropriate advancement flap was drawn incorporating the defect and placing the expected incisions within the relaxed skin tension lines where possible. The area thus outlined was incised deep to adipose tissue with a #15 scalpel blade. The skin margins were undermined to an appropriate distance in all directions utilizing iris scissors.
Fusiform Excision Additional Text (Leave Blank If You Do Not Want): The margin was drawn around the clinically apparent lesion. A fusiform shape was then drawn on the skin incorporating the lesion and margins. Incisions were then made along these lines to the appropriate tissue plane and the lesion was extirpated.
Trilobed Flap Text: The defect edges were debeveled with a #15 scalpel blade. Given the location of the defect and the proximity to free margins a trilobed flap was deemed most appropriate. Using a sterile surgical marker, an appropriate trilobed flap drawn around the defect. The area thus outlined was incised deep to adipose tissue with a #15 scalpel blade. The skin margins were undermined to an appropriate distance in all directions utilizing iris scissors.
Complex Repair And W Plasty Text: The defect edges were debeveled with a #15 scalpel blade. The primary defect was closed partially with a complex linear closure. Given the location of the remaining defect, shape of the defect and the proximity to free margins a W plasty was deemed most appropriate for complete closure of the defect. Using a sterile surgical marker, an appropriate advancement flap was drawn incorporating the defect and placing the expected incisions within the relaxed skin tension lines where possible. The area thus outlined was incised deep to adipose tissue with a #15 scalpel blade. The skin margins were undermined to an appropriate distance in all directions utilizing iris scissors.
Mastoid Interpolation Flap Text: A decision was made to reconstruct the defect utilizing an interpolation axial flap and a staged reconstruction. A telfa template was made of the defect. This telfa template was then used to outline the mastoid interpolation flap. The donor area for the pedicle flap was then injected with anesthesia. The flap was excised through the skin and subcutaneous tissue down to the layer of the underlying musculature. The pedicle flap was carefully excised within this deep plane to maintain its blood supply. The edges of the donor site were undermined. The donor site was closed in a primary fashion. The pedicle was then rotated into position and sutured. Once the tube was sutured into place, adequate blood supply was confirmed with blanching and refill. The pedicle was then wrapped with xeroform gauze and dressed appropriately with a telfa and gauze bandage to ensure continued blood supply and protect the attached pedicle.
Mucosal Advancement Flap Text: Given the location of the defect, shape of the defect and the proximity to free margins a mucosal advancement flap was deemed most appropriate. Incisions were made with a 15 blade scalpel in the appropriate fashion along the cutaneous vermilion border and the mucosal lip. The remaining actinically damaged mucosal tissue was excised. The mucosal advancement flap was then elevated to the gingival sulcus with care taken to preserve the neurovascular structures and advanced into the primary defect. Care was taken to ensure that precise realignment of the vermilion border was achieved.
Bill For Surgical Tray: yes
Post-Care Instructions: I reviewed with the patient in detail post-care instructions. Patient is not to engage in any heavy lifting, exercise, or swimming for the next 14 days. Should the patient develop any fevers, chills, bleeding, severe pain patient will contact the office immediately.
Melolabial Transposition Flap Text: The defect edges were debeveled with a #15 scalpel blade. Given the location of the defect and the proximity to free margins a melolabial flap was deemed most appropriate. Using a sterile surgical marker, an appropriate melolabial transposition flap was drawn incorporating the defect. The area thus outlined was incised deep to adipose tissue with a #15 scalpel blade. The skin margins were undermined to an appropriate distance in all directions utilizing iris scissors.
Complex Repair And Bilobe Flap Text: The defect edges were debeveled with a #15 scalpel blade. The primary defect was closed partially with a complex linear closure. Given the location of the remaining defect, shape of the defect and the proximity to free margins a bilobe flap was deemed most appropriate for complete closure of the defect. Using a sterile surgical marker, an appropriate advancement flap was drawn incorporating the defect and placing the expected incisions within the relaxed skin tension lines where possible. The area thus outlined was incised deep to adipose tissue with a #15 scalpel blade. The skin margins were undermined to an appropriate distance in all directions utilizing iris scissors.
Complex Repair Preamble Text (Leave Blank If You Do Not Want): Extensive wide undermining was performed.
Alar Island Pedicle Flap Text: The defect edges were debeveled with a #15 scalpel blade. Given the location of the defect, shape of the defect and the proximity to the alar rim an island pedicle advancement flap was deemed most appropriate. Using a sterile surgical marker, an appropriate advancement flap was drawn incorporating the defect, outlining the appropriate donor tissue and placing the expected incisions within the nasal ala running parallel to the alar rim. The area thus outlined was incised with a #15 scalpel blade. The skin margins were undermined minimally to an appropriate distance in all directions around the primary defect and laterally outward around the island pedicle utilizing iris scissors. There was minimal undermining beneath the pedicle flap.
Hemostasis: Electrocautery
H Plasty Text: Given the location of the defect, shape of the defect and the proximity to free margins a H-plasty was deemed most appropriate for repair. Using a sterile surgical marker, the appropriate advancement arms of the H-plasty were drawn incorporating the defect and placing the expected incisions within the relaxed skin tension lines where possible. The area thus outlined was incised deep to adipose tissue with a #15 scalpel blade. The skin margins were undermined to an appropriate distance in all directions utilizing iris scissors. The opposing advancement arms were then advanced into place in opposite direction and anchored with interrupted buried subcutaneous sutures.
Double Island Pedicle Flap Text: The defect edges were debeveled with a #15 scalpel blade. Given the location of the defect, shape of the defect and the proximity to free margins a double island pedicle advancement flap was deemed most appropriate. Using a sterile surgical marker, an appropriate advancement flap was drawn incorporating the defect, outlining the appropriate donor tissue and placing the expected incisions within the relaxed skin tension lines where possible. The area thus outlined was incised deep to adipose tissue with a #15 scalpel blade. The skin margins were undermined to an appropriate distance in all directions around the primary defect and laterally outward around the island pedicle utilizing iris scissors. There was minimal undermining beneath the pedicle flap.
V-Y Plasty Text: The defect edges were debeveled with a #15 scalpel blade. Given the location of the defect, shape of the defect and the proximity to free margins an V-Y advancement flap was deemed most appropriate. Using a sterile surgical marker, an appropriate advancement flap was drawn incorporating the defect and placing the expected incisions within the relaxed skin tension lines where possible. The area thus outlined was incised deep to adipose tissue with a #15 scalpel blade. The skin margins were undermined to an appropriate distance in all directions utilizing iris scissors.
A-T Advancement Flap Text: The defect edges were debeveled with a #15 scalpel blade. Given the location of the defect, shape of the defect and the proximity to free margins an A-T advancement flap was deemed most appropriate. Using a sterile surgical marker, an appropriate advancement flap was drawn incorporating the defect and placing the expected incisions within the relaxed skin tension lines where possible. The area thus outlined was incised deep to adipose tissue with a #15 scalpel blade. The skin margins were undermined to an appropriate distance in all directions utilizing iris scissors.
Complex Repair And Double M Plasty Text: The defect edges were debeveled with a #15 scalpel blade. The primary defect was closed partially with a complex linear closure. Given the location of the remaining defect, shape of the defect and the proximity to free margins a double M plasty was deemed most appropriate for complete closure of the defect. Using a sterile surgical marker, an appropriate advancement flap was drawn incorporating the defect and placing the expected incisions within the relaxed skin tension lines where possible. The area thus outlined was incised deep to adipose tissue with a #15 scalpel blade. The skin margins were undermined to an appropriate distance in all directions utilizing iris scissors.
Complex Repair And Rotation Flap Text: The defect edges were debeveled with a #15 scalpel blade. The primary defect was closed partially with a complex linear closure. Given the location of the remaining defect, shape of the defect and the proximity to free margins a rotation flap was deemed most appropriate for complete closure of the defect. Using a sterile surgical marker, an appropriate advancement flap was drawn incorporating the defect and placing the expected incisions within the relaxed skin tension lines where possible. The area thus outlined was incised deep to adipose tissue with a #15 scalpel blade. The skin margins were undermined to an appropriate distance in all directions utilizing iris scissors.
Yes
Lip Wedge Excision Repair Text: Given the location of the defect and the proximity to free margins a full thickness wedge repair was deemed most appropriate. Using a sterile surgical marker, the appropriate repair was drawn incorporating the defect and placing the expected incisions perpendicular to the vermilion border. The vermilion border was also meticulously outlined to ensure appropriate reapproximation during the repair. The area thus outlined was incised through and through with a #15 scalpel blade. The muscularis and dermis were reaproximated with deep sutures following hemostasis. Care was taken to realign the vermilion border before proceeding with the superficial closure. Once the vermilion was realigned the superfical and mucosal closure was finished.
Lab Facility: [address] [place] [address]
Ftsg Text: The defect edges were debeveled with a #15 scalpel blade. Given the location of the defect, shape of the defect and the proximity to free margins a full thickness skin graft was deemed most appropriate. Using a sterile surgical marker, the primary defect shape was transferred to the donor site. The area thus outlined was incised deep to adipose tissue with a #15 scalpel blade. The harvested graft was then trimmed of adipose tissue until only dermis and epidermis was left. The skin margins of the secondary defect were undermined to an appropriate distance in all directions utilizing iris scissors. The secondary defect was closed with interrupted buried subcutaneous sutures. The skin edges were then re-apposed with running  sutures. The skin graft was then placed in the primary defect and oriented appropriately.
Muscle Hinge Flap Text: The defect edges were debeveled with a #15 scalpel blade. Given the size, depth and location of the defect and the proximity to free margins a muscle hinge flap was deemed most appropriate. Using a sterile surgical marker, an appropriate hinge flap was drawn incorporating the defect. The area thus outlined was incised with a #15 scalpel blade. The skin margins were undermined to an appropriate distance in all directions utilizing iris scissors.
Complex Repair And Double Advancement Flap Text: The defect edges were debeveled with a #15 scalpel blade. The primary defect was closed partially with a complex linear closure. Given the location of the remaining defect, shape of the defect and the proximity to free margins a double advancement flap was deemed most appropriate for complete closure of the defect. Using a sterile surgical marker, an appropriate advancement flap was drawn incorporating the defect and placing the expected incisions within the relaxed skin tension lines where possible. The area thus outlined was incised deep to adipose tissue with a #15 scalpel blade. The skin margins were undermined to an appropriate distance in all directions utilizing iris scissors.
Complex Repair And Single Advancement Flap Text: The defect edges were debeveled with a #15 scalpel blade. The primary defect was closed partially with a complex linear closure. Given the location of the remaining defect, shape of the defect and the proximity to free margins a single advancement flap was deemed most appropriate for complete closure of the defect. Using a sterile surgical marker, an appropriate advancement flap was drawn incorporating the defect and placing the expected incisions within the relaxed skin tension lines where possible. The area thus outlined was incised deep to adipose tissue with a #15 scalpel blade. The skin margins were undermined to an appropriate distance in all directions utilizing iris scissors.
Dorsal Nasal Flap Text: The defect edges were debeveled with a #15 scalpel blade. Given the location of the defect and the proximity to free margins a dorsal nasal flap was deemed most appropriate. Using a sterile surgical marker, an appropriate dorsal nasal flap was drawn around the defect. The area thus outlined was incised deep to adipose tissue with a #15 scalpel blade. The skin margins were undermined to an appropriate distance in all directions utilizing iris scissors.
Repair Performed By Another Provider Text (Leave Blank If You Do Not Want): After the tissue was excised the defect was repaired by another provider.
No Repair - Repaired With Adjacent Surgical Defect Text (Leave Blank If You Do Not Want): After the excision the defect was repaired concurrently with another surgical defect which was in close approximation.
Excision Method: Fusiform
Bilateral Helical Rim Advancement Flap Text: The defect edges were debeveled with a #15 blade scalpel. Given the location of the defect and the proximity to free margins (helical rim) a bilateral helical rim advancement flap was deemed most appropriate. Using a sterile surgical marker, the appropriate advancement flaps were drawn incorporating the defect and placing the expected incisions between the helical rim and antihelix where possible. The area thus outlined was incised through and through with a #15 scalpel blade. With a skin hook and iris scissors, the flaps were gently and sharply undermined and freed up.
Complex Repair And Transposition Flap Text: The defect edges were debeveled with a #15 scalpel blade. The primary defect was closed partially with a complex linear closure. Given the location of the remaining defect, shape of the defect and the proximity to free margins a transposition flap was deemed most appropriate for complete closure of the defect. Using a sterile surgical marker, an appropriate advancement flap was drawn incorporating the defect and placing the expected incisions within the relaxed skin tension lines where possible. The area thus outlined was incised deep to adipose tissue with a #15 scalpel blade. The skin margins were undermined to an appropriate distance in all directions utilizing iris scissors.
Complex Repair And Ftsg Text: The defect edges were debeveled with a #15 scalpel blade. The primary defect was closed partially with a complex linear closure. Given the location of the defect, shape of the defect and the proximity to free margins a full thickness skin graft was deemed most appropriate to repair the remaining defect. The graft was trimmed to fit the size of the remaining defect. The graft was then placed in the primary defect, oriented appropriately, and sutured into place.
Size Of Lesion In Cm: 2.5
Island Pedicle Flap Text: The defect edges were debeveled with a #15 scalpel blade. Given the location of the defect, shape of the defect and the proximity to free margins an island pedicle advancement flap was deemed most appropriate. Using a sterile surgical marker, an appropriate advancement flap was drawn incorporating the defect, outlining the appropriate donor tissue and placing the expected incisions within the relaxed skin tension lines where possible. The area thus outlined was incised deep to adipose tissue with a #15 scalpel blade. The skin margins were undermined to an appropriate distance in all directions around the primary defect and laterally outward around the island pedicle utilizing iris scissors. There was minimal undermining beneath the pedicle flap.
Billing Type: [employer]
Home Suture Removal Text: Patient was provided a home suture removal kit and will remove their sutures at home. If they have any questions or difficulties they will call the office.
Suture Removal: 14 days
Estimated Blood Loss (Cc): minimal
W Plasty Text: The lesion was extirpated to the level of the fat with a #15 scalpel blade. Given the location of the defect, shape of the defect and the proximity to free margins a W-plasty was deemed most appropriate for repair. Using a sterile surgical marker, the appropriate transposition arms of the W-plasty were drawn incorporating the defect and placing the expected incisions within the relaxed skin tension lines where possible. The area thus outlined was incised deep to adipose tissue with a #15 scalpel blade. The skin margins were undermined to an appropriate distance in all directions utilizing iris scissors. The opposing transposition arms were then transposed into place in opposite direction and anchored with interrupted buried subcutaneous sutures.
Posterior Auricular Interpolation Flap Text: A decision was made to reconstruct the defect utilizing an interpolation axial flap and a staged reconstruction. A telfa template was made of the defect. This telfa template was then used to outline the posterior auricular interpolation flap. The donor area for the pedicle flap was then injected with anesthesia. The flap was excised through the skin and subcutaneous tissue down to the layer of the underlying musculature. The pedicle flap was carefully excised within this deep plane to maintain its blood supply. The edges of the donor site were undermined. The donor site was closed in a primary fashion. The pedicle was then rotated into position and sutured. Once the tube was sutured into place, adequate blood supply was confirmed with blanching and refill. The pedicle was then wrapped with xeroform gauze and dressed appropriately with a telfa and gauze bandage to ensure continued blood supply and protect the attached pedicle.
Epidermal Closure: simple interrupted
Complex Repair And Split-Thickness Skin Graft Text: The defect edges were debeveled with a #15 scalpel blade. The primary defect was closed partially with a complex linear closure. Given the location of the defect, shape of the defect and the proximity to free margins a split thickness skin graft was deemed most appropriate to repair the remaining defect. The graft was trimmed to fit the size of the remaining defect. The graft was then placed in the primary defect, oriented appropriately, and sutured into place.
Saucerization Excision Additional Text (Leave Blank If You Do Not Want): The margin was drawn around the clinically apparent lesion. Incisions were then made along these lines, in a tangential fashion, to the appropriate tissue plane and the lesion was extirpated.
Advancement-Rotation Flap Text: The defect edges were debeveled with a #15 scalpel blade. Given the location of the defect, shape of the defect and the proximity to free margins an advancement-rotation flap was deemed most appropriate. Using a sterile surgical marker, an appropriate flap was drawn incorporating the defect and placing the expected incisions within the relaxed skin tension lines where possible. The area thus outlined was incised deep to adipose tissue with a #15 scalpel blade. The skin margins were undermined to an appropriate distance in all directions utilizing iris scissors.
O-T Plasty Text: The defect edges were debeveled with a #15 scalpel blade. Given the location of the defect, shape of the defect and the proximity to free margins an O-T plasty was deemed most appropriate. Using a sterile surgical marker, an appropriate O-T plasty was drawn incorporating the defect and placing the expected incisions within the relaxed skin tension lines where possible. The area thus outlined was incised deep to adipose tissue with a #15 scalpel blade. The skin margins were undermined to an appropriate distance in all directions utilizing iris scissors.
Split-Thickness Skin Graft Text: The defect edges were debeveled with a #15 scalpel blade. Given the location of the defect, shape of the defect and the proximity to free margins a split thickness skin graft was deemed most appropriate. Using a sterile surgical marker, the primary defect shape was transferred to the donor site. The split thickness graft was then harvested. The skin graft was then placed in the primary defect and oriented appropriately.
Burow's Advancement Flap Text: The defect edges were debeveled with a #15 scalpel blade. Given the location of the defect and the proximity to free margins a Burow's advancement flap was deemed most appropriate. Using a sterile surgical marker, the appropriate advancement flap was drawn incorporating the defect and placing the expected incisions within the relaxed skin tension lines where possible. The area thus outlined was incised deep to adipose tissue with a #15 scalpel blade. The skin margins were undermined to an appropriate distance in all directions utilizing iris scissors.
X Size Of Lesion In Cm (Optional): 1.5
O-T Advancement Flap Text: The defect edges were debeveled with a #15 scalpel blade. Given the location of the defect, shape of the defect and the proximity to free margins an O-T advancement flap was deemed most appropriate. Using a sterile surgical marker, an appropriate advancement flap was drawn incorporating the defect and placing the expected incisions within the relaxed skin tension lines where possible. The area thus outlined was incised deep to adipose tissue with a #15 scalpel blade. The skin margins were undermined to an appropriate distance in all directions utilizing iris scissors.
Crescentic Advancement Flap Text: The defect edges were debeveled with a #15 scalpel blade. Given the location of the defect and the proximity to free margins a crescentic advancement flap was deemed most appropriate. Using a sterile surgical marker, the appropriate advancement flap was drawn incorporating the defect and placing the expected incisions within the relaxed skin tension lines where possible. The area thus outlined was incised deep to adipose tissue with a #15 scalpel blade. The skin margins were undermined to an appropriate distance in all directions utilizing iris scissors.
Rhombic Flap Text: The defect edges were debeveled with a #15 scalpel blade. Given the location of the defect and the proximity to free margins a rhombic flap was deemed most appropriate. Using a sterile surgical marker, an appropriate rhombic flap was drawn incorporating the defect. The area thus outlined was incised deep to adipose tissue with a #15 scalpel blade. The skin margins were undermined to an appropriate distance in all directions utilizing iris scissors.
Epidermal Sutures: 4-0 Nylon
Bilobed Flap Text: The defect edges were debeveled with a #15 scalpel blade. Given the location of the defect and the proximity to free margins a bilobe flap was deemed most appropriate. Using a sterile surgical marker, an appropriate bilobe flap drawn around the defect. The area thus outlined was incised deep to adipose tissue with a #15 scalpel blade. The skin margins were undermined to an appropriate distance in all directions utilizing iris scissors.
Advancement Flap (Single) Text: The defect edges were debeveled with a #15 scalpel blade. Given the location of the defect and the proximity to free margins a single advancement flap was deemed most appropriate. Using a sterile surgical marker, an appropriate advancement flap was drawn incorporating the defect and placing the expected incisions within the relaxed skin tension lines where possible. The area thus outlined was incised deep to adipose tissue with a #15 scalpel blade. The skin margins were undermined to an appropriate distance in all directions utilizing iris scissors.
Advancement Flap (Double) Text: The defect edges were debeveled with a #15 scalpel blade. Given the location of the defect and the proximity to free margins a double advancement flap was deemed most appropriate. Using a sterile surgical marker, the appropriate advancement flaps were drawn incorporating the defect and placing the expected incisions within the relaxed skin tension lines where possible. The area thus outlined was incised deep to adipose tissue with a #15 scalpel blade. The skin margins were undermined to an appropriate distance in all directions utilizing iris scissors.
Bi-Rhombic Flap Text: The defect edges were debeveled with a #15 scalpel blade. Given the location of the defect and the proximity to free margins a bi-rhombic flap was deemed most appropriate. Using a sterile surgical marker, an appropriate rhombic flap was drawn incorporating the defect. The area thus outlined was incised deep to adipose tissue with a #15 scalpel blade. The skin margins were undermined to an appropriate distance in all directions utilizing iris scissors.
Interpolation Flap Text: A decision was made to reconstruct the defect utilizing an interpolation axial flap and a staged reconstruction. A telfa template was made of the defect. This telfa template was then used to outline the interpolation flap. The donor area for the pedicle flap was then injected with anesthesia. The flap was excised through the skin and subcutaneous tissue down to the layer of the underlying musculature. The interpolation flap was carefully excised within this deep plane to maintain its blood supply. The edges of the donor site were undermined. The donor site was closed in a primary fashion. The pedicle was then rotated into position and sutured. Once the tube was sutured into place, adequate blood supply was confirmed with blanching and refill. The pedicle was then wrapped with xeroform gauze and dressed appropriately with a telfa and gauze bandage to ensure continued blood supply and protect the attached pedicle.
Complex Repair And O-L Flap Text: The defect edges were debeveled with a #15 scalpel blade. The primary defect was closed partially with a complex linear closure. Given the location of the remaining defect, shape of the defect and the proximity to free margins an O-L flap was deemed most appropriate for complete closure of the defect. Using a sterile surgical marker, an appropriate flap was drawn incorporating the defect and placing the expected incisions within the relaxed skin tension lines where possible. The area thus outlined was incised deep to adipose tissue with a #15 scalpel blade. The skin margins were undermined to an appropriate distance in all directions utilizing iris scissors.
Wound Care: Bacitracin
Positioning (Leave Blank If You Do Not Want): The patient was placed in a comfortable position exposing the surgical site.
Size Of Margin In Cm: 0.3
Pre-Excision Curettage Text (Leave Blank If You Do Not Want): Prior to drawing the surgical margin the visible lesion was removed with electrodesiccation and curettage to clearly define the lesion size.
Island Pedicle Flap With Canthal Suspension Text: The defect edges were debeveled with a #15 scalpel blade. Given the location of the defect, shape of the defect and the proximity to free margins an island pedicle advancement flap was deemed most appropriate. Using a sterile surgical marker, an appropriate advancement flap was drawn incorporating the defect, outlining the appropriate donor tissue and placing the expected incisions within the relaxed skin tension lines where possible. The area thus outlined was incised deep to adipose tissue with a #15 scalpel blade. The skin margins were undermined to an appropriate distance in all directions around the primary defect and laterally outward around the island pedicle utilizing iris scissors. There was minimal undermining beneath the pedicle flap. A suspension suture was placed in the canthal tendon to prevent tension and prevent ectropion.
Island Pedicle Flap-Requiring Vessel Identification Text: The defect edges were debeveled with a #15 scalpel blade. Given the location of the defect, shape of the defect and the proximity to free margins an island pedicle advancement flap was deemed most appropriate. Using a sterile surgical marker, an appropriate advancement flap was drawn, based on the axial vessel mentioned above, incorporating the defect, outlining the appropriate donor tissue and placing the expected incisions within the relaxed skin tension lines where possible. The area thus outlined was incised deep to adipose tissue with a #15 scalpel blade. The skin margins were undermined to an appropriate distance in all directions around the primary defect and laterally outward around the island pedicle utilizing iris scissors. There was minimal undermining beneath the pedicle flap.
Anesthesia Volume In Cc: 6
Z Plasty Text: The lesion was extirpated to the level of the fat with a #15 scalpel blade. Given the location of the defect, shape of the defect and the proximity to free margins a Z-plasty was deemed most appropriate for repair. Using a sterile surgical marker, the appropriate transposition arms of the Z-plasty were drawn incorporating the defect and placing the expected incisions within the relaxed skin tension lines where possible. The area thus outlined was incised deep to adipose tissue with a #15 scalpel blade. The skin margins were undermined to an appropriate distance in all directions utilizing iris scissors. The opposing transposition arms were then transposed into place in opposite direction and anchored with interrupted buried subcutaneous sutures.
Transposition Flap Text: The defect edges were debeveled with a #15 scalpel blade. Given the location of the defect and the proximity to free margins a transposition flap was deemed most appropriate. Using a sterile surgical marker, an appropriate transposition flap was drawn incorporating the defect. The area thus outlined was incised deep to adipose tissue with a #15 scalpel blade. The skin margins were undermined to an appropriate distance in all directions utilizing iris scissors.
Complex Repair And Modified Advancement Flap Text: The defect edges were debeveled with a #15 scalpel blade. The primary defect was closed partially with a complex linear closure. Given the location of the remaining defect, shape of the defect and the proximity to free margins a modified advancement flap was deemed most appropriate for complete closure of the defect. Using a sterile surgical marker, an appropriate advancement flap was drawn incorporating the defect and placing the expected incisions within the relaxed skin tension lines where possible. The area thus outlined was incised deep to adipose tissue with a #15 scalpel blade. The skin margins were undermined to an appropriate distance in all directions utilizing iris scissors.
Helical Rim Advancement Flap Text: The defect edges were debeveled with a #15 blade scalpel. Given the location of the defect and the proximity to free margins (helical rim) a double helical rim advancement flap was deemed most appropriate. Using a sterile surgical marker, the appropriate advancement flaps were drawn incorporating the defect and placing the expected incisions between the helical rim and antihelix where possible. The area thus outlined was incised through and through with a #15 scalpel blade. With a skin hook and iris scissors, the flaps were gently and sharply undermined and freed up.
Composite Graft Text: The defect edges were debeveled with a #15 scalpel blade. Given the location of the defect, shape of the defect, the proximity to free margins and the fact the defect was full thickness a composite graft was deemed most appropriate. The defect was outline and then transferred to the donor site. A full thickness graft was then excised from the donor site. The graft was then placed in the primary defect, oriented appropriately and then sutured into place. The secondary defect was then repaired using a primary closure.
Cheek Interpolation Flap Text: A decision was made to reconstruct the defect utilizing an interpolation axial flap and a staged reconstruction. A telfa template was made of the defect. This telfa template was then used to outline the Cheek Interpolation flap. The donor area for the pedicle flap was then injected with anesthesia. The flap was excised through the skin and subcutaneous tissue down to the layer of the underlying musculature. The interpolation flap was carefully excised within this deep plane to maintain its blood supply. The edges of the donor site were undermined. The donor site was closed in a primary fashion. The pedicle was then rotated into position and sutured. Once the tube was sutured into place, adequate blood supply was confirmed with blanching and refill. The pedicle was then wrapped with xeroform gauze and dressed appropriately with a telfa and gauze bandage to ensure continued blood supply and protect the attached pedicle.
Melolabial Interpolation Flap Text: A decision was made to reconstruct the defect utilizing an interpolation axial flap and a staged reconstruction. A telfa template was made of the defect. This telfa template was then used to outline the melolabial interpolation flap. The donor area for the pedicle flap was then injected with anesthesia. The flap was excised through the skin and subcutaneous tissue down to the layer of the underlying musculature. The pedicle flap was carefully excised within this deep plane to maintain its blood supply. The edges of the donor site were undermined. The donor site was closed in a primary fashion. The pedicle was then rotated into position and sutured. Once the tube was sutured into place, adequate blood supply was confirmed with blanching and refill. The pedicle was then wrapped with xeroform gauze and dressed appropriately with a telfa and gauze bandage to ensure continued blood supply and protect the attached pedicle.
Excisional Biopsy Additional Text (Leave Blank If You Do Not Want): The margin was drawn around the clinically apparent lesion. An elliptical shape was then drawn on the skin incorporating the lesion and margins. Incisions were then made along these lines to the appropriate tissue plane and the lesion was extirpated.
Keystone Flap Text: The defect edges were debeveled with a #15 scalpel blade. Given the location of the defect, shape of the defect a keystone flap was deemed most appropriate. Using a sterile surgical marker, an appropriate keystone flap was drawn incorporating the defect, outlining the appropriate donor tissue and placing the expected incisions within the relaxed skin tension lines where possible. The area thus outlined was incised deep to adipose tissue with a #15 scalpel blade. The skin margins were undermined to an appropriate distance in all directions around the primary defect and laterally outward around the flap utilizing iris scissors.
Rotation Flap Text: The defect edges were debeveled with a #15 scalpel blade. Given the location of the defect, shape of the defect and the proximity to free margins a rotation flap was deemed most appropriate. Using a sterile surgical marker, an appropriate rotation flap was drawn incorporating the defect and placing the expected incisions within the relaxed skin tension lines where possible. The area thus outlined was incised deep to adipose tissue with a #15 scalpel blade. The skin margins were undermined to an appropriate distance in all directions utilizing iris scissors.
Intermediate / Complex Repair - Final Wound Length In Cm: 3.1
Undermining Location (Optional): in the superficial subcutaneous fat
Slit Excision Additional Text (Leave Blank If You Do Not Want): A linear line was drawn on the skin overlying the lesion. An incision was made slowly until the lesion was visualized. Once visualized, the lesion was removed with blunt dissection.
Spiral Flap Text: The defect edges were debeveled with a #15 scalpel blade. Given the location of the defect, shape of the defect and the proximity to free margins a spiral flap was deemed most appropriate. Using a sterile surgical marker, an appropriate rotation flap was drawn incorporating the defect and placing the expected incisions within the relaxed skin tension lines where possible. The area thus outlined was incised deep to adipose tissue with a #15 scalpel blade. The skin margins were undermined to an appropriate distance in all directions utilizing iris scissors.
Scalpel Size: 15 blade
Complex Repair And Dermal Autograft Text: The defect edges were debeveled with a #15 scalpel blade. The primary defect was closed partially with a complex linear closure. Given the location of the defect, shape of the defect and the proximity to free margins an dermal autograft was deemed most appropriate to repair the remaining defect. The graft was trimmed to fit the size of the remaining defect. The graft was then placed in the primary defect, oriented appropriately, and sutured into place.
S Plasty Text: Given the location and shape of the defect, and the orientation of relaxed skin tension lines, an S-plasty was deemed most appropriate for repair. Using a sterile surgical marker, the appropriate outline of the S-plasty was drawn, incorporating the defect and placing the expected incisions within the relaxed skin tension lines where possible. The area thus outlined was incised deep to adipose tissue with a #15 scalpel blade. The skin margins were undermined to an appropriate distance in all directions utilizing iris scissors. The skin flaps were advanced over the defect. The opposing margins were then approximated with interrupted buried subcutaneous sutures.
Cheek-To-Nose Interpolation Flap Text: A decision was made to reconstruct the defect utilizing an interpolation axial flap and a staged reconstruction. A telfa template was made of the defect. This telfa template was then used to outline the Cheek-To-Nose Interpolation flap. The donor area for the pedicle flap was then injected with anesthesia. The flap was excised through the skin and subcutaneous tissue down to the layer of the underlying musculature. The interpolation flap was carefully excised within this deep plane to maintain its blood supply. The edges of the donor site were undermined. The donor site was closed in a primary fashion. The pedicle was then rotated into position and sutured. Once the tube was sutured into place, adequate blood supply was confirmed with blanching and refill. The pedicle was then wrapped with xeroform gauze and dressed appropriately with a telfa and gauze bandage to ensure continued blood supply and protect the attached pedicle.
Tissue Cultured Epidermal Autograft Text: The defect edges were debeveled with a #15 scalpel blade. Given the location of the defect, shape of the defect and the proximity to free margins a tissue cultured epidermal autograft was deemed most appropriate. The graft was then trimmed to fit the size of the defect. The graft was then placed in the primary defect and oriented appropriately.
Partial Purse String (Simple) Text: Given the location of the defect and the characteristics of the surrounding skin a simple purse string closure was deemed most appropriate. Undermining was performed circumferentially around the surgical defect. A purse string suture was then placed and tightened. Wound tension of the circular defect prevented complete closure of the wound.
Repair Type: Complex
Complex Repair And M Plasty Text: The defect edges were debeveled with a #15 scalpel blade. The primary defect was closed partially with a complex linear closure. Given the location of the remaining defect, shape of the defect and the proximity to free margins an M plasty was deemed most appropriate for complete closure of the defect. Using a sterile surgical marker, an appropriate advancement flap was drawn incorporating the defect and placing the expected incisions within the relaxed skin tension lines where possible. The area thus outlined was incised deep to adipose tissue with a #15 scalpel blade. The skin margins were undermined to an appropriate distance in all directions utilizing iris scissors.
Complex Repair And Z Plasty Text: The defect edges were debeveled with a #15 scalpel blade. The primary defect was closed partially with a complex linear closure. Given the location of the remaining defect, shape of the defect and the proximity to free margins a Z plasty was deemed most appropriate for complete closure of the defect. Using a sterile surgical marker, an appropriate advancement flap was drawn incorporating the defect and placing the expected incisions within the relaxed skin tension lines where possible. The area thus outlined was incised deep to adipose tissue with a #15 scalpel blade. The skin margins were undermined to an appropriate distance in all directions utilizing iris scissors.
V-Y Flap Text: The defect edges were debeveled with a #15 scalpel blade. Given the location of the defect, shape of the defect and the proximity to free margins a V-Y flap was deemed most appropriate. Using a sterile surgical marker, an appropriate advancement flap was drawn incorporating the defect and placing the expected incisions within the relaxed skin tension lines where possible. The area thus outlined was incised deep to adipose tissue with a #15 scalpel blade. The skin margins were undermined to an appropriate distance in all directions utilizing iris scissors.
Skin Substitute Text: The defect edges were debeveled with a #15 scalpel blade. Given the location of the defect, shape of the defect and the proximity to free margins a skin substitute graft was deemed most appropriate. The graft material was trimmed to fit the size of the defect. The graft was then placed in the primary defect and oriented appropriately.
Epidermal Autograft Text: The defect edges were debeveled with a #15 scalpel blade. Given the location of the defect, shape of the defect and the proximity to free margins an epidermal autograft was deemed most appropriate. Using a sterile surgical marker, the primary defect shape was transferred to the donor site. The epidermal graft was then harvested. The skin graft was then placed in the primary defect and oriented appropriately.
Lab: [id number] [facility]
Excision Depth: adipose tissue
Body Location Override (Optional - Billing Will Still Be Based On Selected Body Map Location If Applicable): left ventral dorsal forearm
Accession #: [id number]
Partial Purse String (Intermediate) Text: Given the location of the defect and the characteristics of the surrounding skin an intermediate purse string closure was deemed most appropriate. Undermining was performed circumferentially around the surgical defect. A purse string suture was then placed and tightened. Wound tension of the circular defect prevented complete closure of the wound.
Complex Repair And Epidermal Autograft Text: The defect edges were debeveled with a #15 scalpel blade. The primary defect was closed partially with a complex linear closure. Given the location of the defect, shape of the defect and the proximity to free margins an epidermal autograft was deemed most appropriate to repair the remaining defect. The graft was trimmed to fit the size of the remaining defect. The graft was then placed in the primary defect, oriented appropriately, and sutured into place.
Star Wedge Flap Text: The defect edges were debeveled with a #15 scalpel blade. Given the location of the defect, shape of the defect and the proximity to free margins a star wedge flap was deemed most appropriate. Using a sterile surgical marker, an appropriate rotation flap was drawn incorporating the defect and placing the expected incisions within the relaxed skin tension lines where possible. The area thus outlined was incised deep to adipose tissue with a #15 scalpel blade. The skin margins were undermined to an appropriate distance in all directions utilizing iris scissors.
Complex Repair And Xenograft Text: The defect edges were debeveled with a #15 scalpel blade. The primary defect was closed partially with a complex linear closure. Given the location of the defect, shape of the defect and the proximity to free margins a xenograft was deemed most appropriate to repair the remaining defect. The graft was trimmed to fit the size of the remaining defect. The graft was then placed in the primary defect, oriented appropriately, and sutured into place.
Detail Level: Detailed
Bilobed Transposition Flap Text: The defect edges were debeveled with a #15 scalpel blade. Given the location of the defect and the proximity to free margins a bilobed transposition flap was deemed most appropriate. Using a sterile surgical marker, an appropriate bilobe flap drawn around the defect. The area thus outlined was incised deep to adipose tissue with a #15 scalpel blade. The skin margins were undermined to an appropriate distance in all directions utilizing iris scissors.
Consent was obtained from the patient. The risks and benefits to therapy were discussed in detail. Specifically, the risks of infection, scarring, bleeding, prolonged wound healing, incomplete removal, allergy to anesthesia, nerve injury and recurrence were addressed. Prior to the procedure, the treatment site was clearly identified and confirmed by the patient. All components of Universal Protocol/PAUSE Rule completed.
Curvilinear Excision Additional Text (Leave Blank If You Do Not Want): The margin was drawn around the clinically apparent lesion. A curvilinear shape was then drawn on the skin incorporating the lesion and margins. Incisions were then made along these lines to the appropriate tissue plane and the lesion was extirpated.
Elliptical Excision Additional Text (Leave Blank If You Do Not Want): The margin was drawn around the clinically apparent lesion.  An elliptical shape was then drawn on the skin incorporating the lesion and margins.  Incisions were then made along these lines to the appropriate tissue plane and the lesion was extirpated.
Intermediate Repair Preamble Text (Leave Blank If You Do Not Want): Undermining was performed with blunt dissection.
Complex Repair And Dorsal Nasal Flap Text: The defect edges were debeveled with a #15 scalpel blade. The primary defect was closed partially with a complex linear closure. Given the location of the remaining defect, shape of the defect and the proximity to free margins a dorsal nasal flap was deemed most appropriate for complete closure of the defect. Using a sterile surgical marker, an appropriate flap was drawn incorporating the defect and placing the expected incisions within the relaxed skin tension lines where possible. The area thus outlined was incised deep to adipose tissue with a #15 scalpel blade. The skin margins were undermined to an appropriate distance in all directions utilizing iris scissors.
Dressing: dry sterile dressing
Modified Advancement Flap Text: The defect edges were debeveled with a #15 scalpel blade. Given the location of the defect, shape of the defect and the proximity to free margins a modified advancement flap was deemed most appropriate. Using a sterile surgical marker, an appropriate advancement flap was drawn incorporating the defect and placing the expected incisions within the relaxed skin tension lines where possible. The area thus outlined was incised deep to adipose tissue with a #15 scalpel blade. The skin margins were undermined to an appropriate distance in all directions utilizing iris scissors.
Hatchet Flap Text: The defect edges were debeveled with a #15 scalpel blade. Given the location of the defect, shape of the defect and the proximity to free margins a hatchet flap was deemed most appropriate. Using a sterile surgical marker, an appropriate hatchet flap was drawn incorporating the defect and placing the expected incisions within the relaxed skin tension lines where possible. The area thus outlined was incised deep to adipose tissue with a #15 scalpel blade. The skin margins were undermined to an appropriate distance in all directions utilizing iris scissors.
Paramedian Forehead Flap Text: A decision was made to reconstruct the defect utilizing an interpolation axial flap and a staged reconstruction. A telfa template was made of the defect. This telfa template was then used to outline the paramedian forehead pedicle flap. The donor area for the pedicle flap was then injected with anesthesia. The flap was excised through the skin and subcutaneous tissue down to the layer of the underlying musculature. The pedicle flap was carefully excised within this deep plane to maintain its blood supply. The edges of the donor site were undermined. The donor site was closed in a primary fashion. The pedicle was then rotated into position and sutured. Once the tube was sutured into place, adequate blood supply was confirmed with blanching and refill. The pedicle was then wrapped with xeroform gauze and dressed appropriately with a telfa and gauze bandage to ensure continued blood supply and protect the attached pedicle.
Ear Star Wedge Flap Text: The defect edges were debeveled with a #15 blade scalpel. Given the location of the defect and the proximity to free margins (helical rim) an ear star wedge flap was deemed most appropriate. Using a sterile surgical marker, the appropriate flap was drawn incorporating the defect and placing the expected incisions between the helical rim and antihelix where possible. The area thus outlined was incised through and through with a #15 scalpel blade.
Complex Repair And Skin Substitute Graft Text: The defect edges were debeveled with a #15 scalpel blade. The primary defect was closed partially with a complex linear closure. Given the location of the remaining defect, shape of the defect and the proximity to free margins a skin substitute graft was deemed most appropriate to repair the remaining defect. The graft was trimmed to fit the size of the remaining defect. The graft was then placed in the primary defect, oriented appropriately, and sutured into place.
Perilesional Excision Additional Text (Leave Blank If You Do Not Want): The margin was drawn around the clinically apparent lesion. Incisions were then made along these lines to the appropriate tissue plane and the lesion was extirpated.
Complex Repair And V-Y Plasty Text: The defect edges were debeveled with a #15 scalpel blade. The primary defect was closed partially with a complex linear closure. Given the location of the remaining defect, shape of the defect and the proximity to free margins a V-Y plasty was deemed most appropriate for complete closure of the defect. Using a sterile surgical marker, an appropriate advancement flap was drawn incorporating the defect and placing the expected incisions within the relaxed skin tension lines where possible. The area thus outlined was incised deep to adipose tissue with a #15 scalpel blade. The skin margins were undermined to an appropriate distance in all directions utilizing iris scissors.
Complex Repair And Tissue Cultured Epidermal Autograft Text: The defect edges were debeveled with a #15 scalpel blade. The primary defect was closed partially with a complex linear closure. Given the location of the defect, shape of the defect and the proximity to free margins an tissue cultured epidermal autograft was deemed most appropriate to repair the remaining defect. The graft was trimmed to fit the size of the remaining defect. The graft was then placed in the primary defect, oriented appropriately, and sutured into place.

## 2019-01-23 NOTE — ED PROVIDER NOTE - PHYSICAL EXAMINATION
CONSTITUTIONAL: Well-appearing; well-nourished; in no apparent distress.   CARDIOVASCULAR: Normal S1, S2; no murmurs, rubs, or gallops.   RESPIRATORY: Normal chest excursion with respiration; breath sounds clear and equal bilaterally; no wheezes, rhonchi, or rales.  GI/: Normal bowel sounds; non-distended; + diffuse mild abdominal ttp. No rebound or guarding. Neg murphys sign. No CVA tenderness  MS: No evidence of trauma or deformity.  Normal ROM in all four extremities; non-tender to palpation; distal pulses are normal.   SKIN: Normal for age and race; warm; dry; good turgor; no apparent lesions or exudate.   NEURO/PSYCH: A & O x 4; grossly unremarkable. mood and manner are appropriate. Grooming and personal hygiene are appropriate.

## 2019-01-23 NOTE — ED PROVIDER NOTE - ATTENDING CONTRIBUTION TO CARE
I personally evaluated the patient. I reviewed the Resident’s or Physician Assistant’s note (as assigned above), and agree with the findings and plan except as documented in my note. 87yo F presents c/o abd pain which is burning in nature, after eating,  worse after eating worsening over 4 weeks 6  weeks.  She deneis any chest pain, shortness of breath she denies any palpitations.  She denies any fevers or chills.   She started getting worse pain to ED for eval. no vomiting, no diarrhea, 12 lb weight loss. No dysuria, hesitancy or frequency, no BRBPR. PE: appears pale, diffuse tenderness, no guarding, no rebound, Plan: labs, IV fluids, Pain meds and CT. ekg reveals bifasicular block.

## 2019-01-23 NOTE — ED PROVIDER NOTE - MEDICAL DECISION MAKING DETAILS
I was present for and supervised the key and critical aspects of the procedures performed during the care of the patient. Patient is tolerating po.  discussed case with dr humphries who will follow [facility] outpatient gi follow up we discussed indications to return

## 2019-01-23 NOTE — ED PROVIDER NOTE - PROGRESS NOTE DETAILS
I personally evaluated the patient. I reviewed the Resident’s or Physician Assistant’s note (as assigned above), and agree with the findings and plan except as documented in my note. 89yo F presents c/o abd pain, worse after eating worsening over 4 weeks. She started getting worse pain to ED for eval. no vomiting, no diarrhea, 12 lb weight loss. No dysuria, hesitancy or frequency, no BRBPR. PE: appears pale, diffuse tenderness, no guarding, no rebound, Plan: labs, IV fluids, Pain meds and CT.

## 2019-01-23 NOTE — ED PROVIDER NOTE - OBJECTIVE STATEMENT
88 year old female with pmhx of a fib on xarelto, HTN, DM, HLD, cholecystectomy c/o epigastric pain/vomiting x 2 months. The epigastric pain occurs after eating, followed by non bilious, non bloody vomiting that relieves her symptoms. Sts she saw GI Dr. Mooney 1 week ago and were discussing having  an endoscopy. Pt called her pmd Dr. Rubio today after having 2 episodes of vomiting last night and instructed her to go to the ED. Pt currently denies any symptoms. No fever/chills, diarrhea, cp, sob, urinary symptoms.

## 2019-01-24 ENCOUNTER — INPATIENT (INPATIENT)
Facility: HOSPITAL | Age: 84
LOS: 1 days | Discharge: HOME HEALTH | End: 2019-01-26
Attending: INTERNAL MEDICINE | Admitting: INTERNAL MEDICINE

## 2019-01-24 VITALS
WEIGHT: 149.91 LBS | TEMPERATURE: 99 F | RESPIRATION RATE: 18 BRPM | DIASTOLIC BLOOD PRESSURE: 56 MMHG | OXYGEN SATURATION: 95 % | HEIGHT: 66 IN | HEART RATE: 95 BPM | SYSTOLIC BLOOD PRESSURE: 118 MMHG

## 2019-01-24 DIAGNOSIS — Z98.890 OTHER SPECIFIED POSTPROCEDURAL STATES: Chronic | ICD-10-CM

## 2019-01-24 LAB
ALBUMIN SERPL ELPH-MCNC: 3.7 G/DL — SIGNIFICANT CHANGE UP (ref 3.5–5.2)
ALP SERPL-CCNC: 132 U/L — HIGH (ref 30–115)
ALT FLD-CCNC: 76 U/L — HIGH (ref 0–41)
ANION GAP SERPL CALC-SCNC: 16 MMOL/L — HIGH (ref 7–14)
APPEARANCE UR: ABNORMAL
AST SERPL-CCNC: 27 U/L — SIGNIFICANT CHANGE UP (ref 0–41)
BASOPHILS # BLD AUTO: 0.01 K/UL — SIGNIFICANT CHANGE UP (ref 0–0.2)
BASOPHILS NFR BLD AUTO: 0.2 % — SIGNIFICANT CHANGE UP (ref 0–1)
BILIRUB SERPL-MCNC: 0.3 MG/DL — SIGNIFICANT CHANGE UP (ref 0.2–1.2)
BILIRUB UR-MCNC: NEGATIVE — SIGNIFICANT CHANGE UP
BLD GP AB SCN SERPL QL: SIGNIFICANT CHANGE UP
BUN SERPL-MCNC: 16 MG/DL — SIGNIFICANT CHANGE UP (ref 10–20)
CALCIUM SERPL-MCNC: 9.2 MG/DL — SIGNIFICANT CHANGE UP (ref 8.5–10.1)
CHLORIDE SERPL-SCNC: 97 MMOL/L — LOW (ref 98–110)
CO2 SERPL-SCNC: 23 MMOL/L — SIGNIFICANT CHANGE UP (ref 17–32)
COLOR SPEC: YELLOW — SIGNIFICANT CHANGE UP
CREAT SERPL-MCNC: 0.9 MG/DL — SIGNIFICANT CHANGE UP (ref 0.7–1.5)
DIFF PNL FLD: ABNORMAL
EOSINOPHIL # BLD AUTO: 0.02 K/UL — SIGNIFICANT CHANGE UP (ref 0–0.7)
EOSINOPHIL NFR BLD AUTO: 0.3 % — SIGNIFICANT CHANGE UP (ref 0–8)
GLUCOSE SERPL-MCNC: 213 MG/DL — HIGH (ref 70–99)
GLUCOSE UR QL: NEGATIVE — SIGNIFICANT CHANGE UP
HCT VFR BLD CALC: 38.3 % — SIGNIFICANT CHANGE UP (ref 37–47)
HGB BLD-MCNC: 12.6 G/DL — SIGNIFICANT CHANGE UP (ref 12–16)
IMM GRANULOCYTES NFR BLD AUTO: 0.5 % — HIGH (ref 0.1–0.3)
KETONES UR-MCNC: ABNORMAL
LACTATE SERPL-SCNC: 1.5 MMOL/L — SIGNIFICANT CHANGE UP (ref 0.5–2.2)
LEUKOCYTE ESTERASE UR-ACNC: ABNORMAL
LIDOCAIN IGE QN: 30 U/L — SIGNIFICANT CHANGE UP (ref 7–60)
LYMPHOCYTES # BLD AUTO: 0.57 K/UL — LOW (ref 1.2–3.4)
LYMPHOCYTES # BLD AUTO: 9.2 % — LOW (ref 20.5–51.1)
MCHC RBC-ENTMCNC: 31.2 PG — HIGH (ref 27–31)
MCHC RBC-ENTMCNC: 32.9 G/DL — SIGNIFICANT CHANGE UP (ref 32–37)
MCV RBC AUTO: 94.8 FL — SIGNIFICANT CHANGE UP (ref 81–99)
MONOCYTES # BLD AUTO: 0.89 K/UL — HIGH (ref 0.1–0.6)
MONOCYTES NFR BLD AUTO: 14.3 % — HIGH (ref 1.7–9.3)
NEUTROPHILS # BLD AUTO: 4.69 K/UL — SIGNIFICANT CHANGE UP (ref 1.4–6.5)
NEUTROPHILS NFR BLD AUTO: 75.5 % — HIGH (ref 42.2–75.2)
NITRITE UR-MCNC: NEGATIVE — SIGNIFICANT CHANGE UP
NRBC # BLD: 0 /100 WBCS — SIGNIFICANT CHANGE UP (ref 0–0)
PH UR: 6 — SIGNIFICANT CHANGE UP (ref 5–8)
PLATELET # BLD AUTO: 176 K/UL — SIGNIFICANT CHANGE UP (ref 130–400)
POTASSIUM SERPL-MCNC: 4.4 MMOL/L — SIGNIFICANT CHANGE UP (ref 3.5–5)
POTASSIUM SERPL-SCNC: 4.4 MMOL/L — SIGNIFICANT CHANGE UP (ref 3.5–5)
PROT SERPL-MCNC: 5.7 G/DL — LOW (ref 6–8)
PROT UR-MCNC: 100
RBC # BLD: 4.04 M/UL — LOW (ref 4.2–5.4)
RBC # FLD: 13.1 % — SIGNIFICANT CHANGE UP (ref 11.5–14.5)
RBC CASTS # UR COMP ASSIST: SIGNIFICANT CHANGE UP /HPF
SODIUM SERPL-SCNC: 136 MMOL/L — SIGNIFICANT CHANGE UP (ref 135–146)
SP GR SPEC: >=1.03 — SIGNIFICANT CHANGE UP (ref 1.01–1.03)
TROPONIN T SERPL-MCNC: <0.01 NG/ML — SIGNIFICANT CHANGE UP
TYPE + AB SCN PNL BLD: SIGNIFICANT CHANGE UP
UROBILINOGEN FLD QL: 1 (ref 0.2–0.2)
WBC # BLD: 6.21 K/UL — SIGNIFICANT CHANGE UP (ref 4.8–10.8)
WBC # FLD AUTO: 6.21 K/UL — SIGNIFICANT CHANGE UP (ref 4.8–10.8)
WBC UR QL: >50 /HPF

## 2019-01-24 RX ORDER — ONDANSETRON 8 MG/1
4 TABLET, FILM COATED ORAL ONCE
Qty: 0 | Refills: 0 | Status: COMPLETED | OUTPATIENT
Start: 2019-01-24 | End: 2019-01-24

## 2019-01-24 RX ORDER — RIVAROXABAN 15 MG-20MG
20 KIT ORAL EVERY 24 HOURS
Qty: 0 | Refills: 0 | Status: DISCONTINUED | OUTPATIENT
Start: 2019-01-24 | End: 2019-01-26

## 2019-01-24 RX ORDER — SODIUM CHLORIDE 9 MG/ML
1000 INJECTION INTRAMUSCULAR; INTRAVENOUS; SUBCUTANEOUS ONCE
Qty: 0 | Refills: 0 | Status: COMPLETED | OUTPATIENT
Start: 2019-01-24 | End: 2019-01-24

## 2019-01-24 RX ORDER — CEFTRIAXONE 500 MG/1
1 INJECTION, POWDER, FOR SOLUTION INTRAMUSCULAR; INTRAVENOUS ONCE
Qty: 0 | Refills: 0 | Status: COMPLETED | OUTPATIENT
Start: 2019-01-24 | End: 2019-01-24

## 2019-01-24 RX ORDER — SIMVASTATIN 20 MG/1
40 TABLET, FILM COATED ORAL AT BEDTIME
Qty: 0 | Refills: 0 | Status: DISCONTINUED | OUTPATIENT
Start: 2019-01-24 | End: 2019-01-26

## 2019-01-24 RX ORDER — SODIUM CHLORIDE 9 MG/ML
1000 INJECTION INTRAMUSCULAR; INTRAVENOUS; SUBCUTANEOUS
Qty: 0 | Refills: 0 | Status: DISCONTINUED | OUTPATIENT
Start: 2019-01-24 | End: 2019-01-25

## 2019-01-24 RX ORDER — METOPROLOL TARTRATE 50 MG
50 TABLET ORAL
Qty: 0 | Refills: 0 | Status: DISCONTINUED | OUTPATIENT
Start: 2019-01-24 | End: 2019-01-26

## 2019-01-24 RX ORDER — PANTOPRAZOLE SODIUM 20 MG/1
40 TABLET, DELAYED RELEASE ORAL
Qty: 0 | Refills: 0 | Status: DISCONTINUED | OUTPATIENT
Start: 2019-01-24 | End: 2019-01-26

## 2019-01-24 RX ORDER — DILTIAZEM HCL 120 MG
300 CAPSULE, EXT RELEASE 24 HR ORAL DAILY
Qty: 0 | Refills: 0 | Status: DISCONTINUED | OUTPATIENT
Start: 2019-01-24 | End: 2019-01-26

## 2019-01-24 RX ORDER — DOCUSATE SODIUM 100 MG
100 CAPSULE ORAL THREE TIMES A DAY
Qty: 0 | Refills: 0 | Status: DISCONTINUED | OUTPATIENT
Start: 2019-01-24 | End: 2019-01-26

## 2019-01-24 RX ORDER — ONDANSETRON 8 MG/1
4 TABLET, FILM COATED ORAL THREE TIMES A DAY
Qty: 0 | Refills: 0 | Status: DISCONTINUED | OUTPATIENT
Start: 2019-01-24 | End: 2019-01-26

## 2019-01-24 RX ORDER — INSULIN GLARGINE 100 [IU]/ML
14 INJECTION, SOLUTION SUBCUTANEOUS AT BEDTIME
Qty: 0 | Refills: 0 | Status: DISCONTINUED | OUTPATIENT
Start: 2019-01-24 | End: 2019-01-26

## 2019-01-24 RX ADMIN — Medication 100 MILLIGRAM(S): at 22:46

## 2019-01-24 RX ADMIN — SIMVASTATIN 40 MILLIGRAM(S): 20 TABLET, FILM COATED ORAL at 22:46

## 2019-01-24 RX ADMIN — ONDANSETRON 4 MILLIGRAM(S): 8 TABLET, FILM COATED ORAL at 15:38

## 2019-01-24 RX ADMIN — SODIUM CHLORIDE 2000 MILLILITER(S): 9 INJECTION INTRAMUSCULAR; INTRAVENOUS; SUBCUTANEOUS at 15:38

## 2019-01-24 RX ADMIN — Medication 50 MILLIGRAM(S): at 22:51

## 2019-01-24 RX ADMIN — Medication 300 MILLIGRAM(S): at 22:52

## 2019-01-24 RX ADMIN — CEFTRIAXONE 100 GRAM(S): 500 INJECTION, POWDER, FOR SOLUTION INTRAMUSCULAR; INTRAVENOUS at 16:43

## 2019-01-24 RX ADMIN — RIVAROXABAN 20 MILLIGRAM(S): KIT at 22:45

## 2019-01-24 NOTE — ED PROVIDER NOTE - MEDICAL DECISION MAKING DETAILS
pt with abd pain for many weeks.  pt seen yesterday and had negative CT/sono.  pt with persistent pain.  pt was told by GI today to come to ED for persistent pain.  pt with decreased po intake due to pain.  Pt to have endoscopy as inpatient.  no blood in stool, no black stool.  abd exam benign in the ed.  pt admitted to medicine and will have inpatient GI evaluation pt with abd pain for many weeks.  pt seen yesterday and had negative CT/sono.  pt with persistent pain.  pt was told by GI today to come to ED for persistent pain.  pt with decreased po intake due to pain.  Pt to have endoscopy as inpatient.  no blood in stool, no black stool.  abd exam benign in the ed.  pt admitted to medicine and will have inpatient GI evaluation.  pt also with uti.  pt given abx.

## 2019-01-24 NOTE — ED PROVIDER NOTE - CARE PLAN
Principal Discharge DX:	Abdominal pain  Secondary Diagnosis:	Nausea and vomiting Principal Discharge DX:	Abdominal pain  Secondary Diagnosis:	Nausea and vomiting  Secondary Diagnosis:	UTI (urinary tract infection)

## 2019-01-24 NOTE — ED ADULT NURSE NOTE - CHIEF COMPLAINT QUOTE
Pt c/o abdominal pain, worsening after eating with multiple episodes of vomiting occurring after eating, pt was seen and d/c'd from [facility] yesterday, came today with worsening symptoms

## 2019-01-24 NOTE — ED ADULT TRIAGE NOTE - CHIEF COMPLAINT QUOTE
Pt c/o abdominal pain, worsening after eating with multiple episodes of vomiting occurring after eating, pt was seen and d/c'd from [facility] yesterday, came today with worsening symptoms

## 2019-01-24 NOTE — H&P ADULT - ASSESSMENT
89 y/o female with pmh of afib on xarelto, HTN, DM, HLD, cholecystectomy, seen in the ER at [facility] yesterday (1/24/2019) for abdominal pain and vomiting, sent by Dr. Mooney office for evaluation and admission.    # Abdominal pain, for 2 months duration  - GI consulted ()  - plan for EGD tomorrow as per GI  - pantoprazole 40mg daily   - NS @ 150cc hour  - NPO @ midnight     # Afib on xarelto  - will continue with xarelto  - rate and rhythm controlled  - EKG consistent with previous ekg    # HTN, 166/83  - slightly elevated possible 2/2 pain  - will monitor bp  - c/w metoprolol 50mg daily and cardizem 300    # DM, blood sugar elevated  - will hold home oral mediations  - will start insulin lantus @ 14 units  - add lispro as needed, blood sugars in low 200s currently    # DLD  - c/w atorvastatin daily    Activity Ambulate as tolerated   diet: npo after midnight, carbohydrate consistent otherwise  GI ppx: on pantoprazole 40mg daily  DVT ppx: on xarelto  Full code  dispo: from home

## 2019-01-24 NOTE — H&P ADULT - HISTORY OF PRESENT ILLNESS
87 y/o female with pmh of afib on xarelto, HTN, DM, HLD, cholecystectomy, seen in the ER at [facility] yesterday (1/24/2019) for abdominal pain and vomiting, sent by Dr. Mooney office for evaluation and admission.  Patient has the ongoing chief complaint of  abdominal pain which has been ongoing for 2 months.  The pain is diffuse and is not relieved by any particular treatment but is interrmittent.  She explains that the pain does not radiate and is not exacerbated by anything. Patient states that today she was able to tolerate a bowl of soup which is different from her baseline where she usually has difficulty tolerating all food in general.  . She states she called her GI office and was told to come to the ED for evaluation and admission for EGD this week  The patient also reports no changes in her bowel movements, no fever, no chills, no headache, no back pain, no chest pain, no dysuria.

## 2019-01-24 NOTE — ED ADULT NURSE NOTE - OBJECTIVE STATEMENT
Pt c/o generalized abd pain, nausea and vomiting x 2 months. Pt was d/c'd yesterday from [facility] with zofran PO. Denies any other symptoms

## 2019-01-24 NOTE — ED PROVIDER NOTE - OBJECTIVE STATEMENT
87 yo F with hx of afib on xarelto, HTN, DM, HLD, cholecystectomy, seen in the ED at [facility] yesterday for abdominal pain and vomiting, sent by Dr. Mooney office for evaluation and admission, presents for abdominal pain, ongoing for 2 months, with decreased PO intake. NO fever, no chills, no headache, no back pain, no chest pain, no dysuria. Pt states that today she was able to tolerate a bowl of soup. She states she called her GI office and was told to come to the ED for evaluation and admission for EGD this week. Pt denies any other symptoms.

## 2019-01-24 NOTE — ED PROVIDER NOTE - PROGRESS NOTE DETAILS
Spoke to the office of Dr. Mooney, who states that patient was told to come to the ED for evaluation since she had stated that she was still having pain. Patient's son states he was told to come to the ED for admission for EGD Pt abdomen soft non tender non distended. Pt with UTI will give abx Spoke to Dr. Rubio, PMMARIELA, aware of patient, admit under his service

## 2019-01-24 NOTE — ED PROVIDER NOTE - ATTENDING CONTRIBUTION TO CARE
pt wiht chronic abd pain for the past 4 months.  no cp, no sob, no back pain.  pain is epigastric and occurs with eating.  no blood in stool, no black stool.  + decreased PO intake.  no back pain.  no headache, no cp, no sob, no fevers, no chills.    pt was seen at [facility] yesterday and had labs, ct, sono.  CT/sono done yesterday was negative for anything acute.  pt followed up wiht GI today who sent pt to ED for admission for inpatient endoscopy.  pt has no urinary complaints.  no dysuria.  awake, alert. abd soft wiht mild epigastric tenderness.  lungs clear.  mmm.  p:  labs, ivf, cxr, ekg, admission

## 2019-01-24 NOTE — ED ADULT NURSE NOTE - NSIMPLEMENTINTERV_GEN_ALL_ED
Implemented All Fall with Harm Risk Interventions:  [place] to call system. Call bell, personal items and telephone within reach. Instruct patient to call for assistance. Room bathroom lighting operational. Non-slip footwear when patient is off stretcher. Physically safe environment: no spills, clutter or unnecessary equipment. Stretcher in lowest position, wheels locked, appropriate side rails in place. Provide visual cue, wrist band, yellow gown, etc. Monitor gait and stability. Monitor for mental status changes and reorient to person, place, and time. Review medications for side effects contributing to fall risk. Reinforce activity limits and safety measures with patient and family. Provide visual clues: red socks.

## 2019-01-25 LAB
ANION GAP SERPL CALC-SCNC: 14 MMOL/L — SIGNIFICANT CHANGE UP (ref 7–14)
BASOPHILS # BLD AUTO: 0.02 K/UL — SIGNIFICANT CHANGE UP (ref 0–0.2)
BASOPHILS NFR BLD AUTO: 0.3 % — SIGNIFICANT CHANGE UP (ref 0–1)
BUN SERPL-MCNC: 13 MG/DL — SIGNIFICANT CHANGE UP (ref 10–20)
CALCIUM SERPL-MCNC: 9.1 MG/DL — SIGNIFICANT CHANGE UP (ref 8.5–10.1)
CHLORIDE SERPL-SCNC: 100 MMOL/L — SIGNIFICANT CHANGE UP (ref 98–110)
CO2 SERPL-SCNC: 24 MMOL/L — SIGNIFICANT CHANGE UP (ref 17–32)
CREAT SERPL-MCNC: 0.7 MG/DL — SIGNIFICANT CHANGE UP (ref 0.7–1.5)
EOSINOPHIL # BLD AUTO: 0.11 K/UL — SIGNIFICANT CHANGE UP (ref 0–0.7)
EOSINOPHIL NFR BLD AUTO: 1.7 % — SIGNIFICANT CHANGE UP (ref 0–8)
GLUCOSE BLDC GLUCOMTR-MCNC: 142 MG/DL — HIGH (ref 70–99)
GLUCOSE BLDC GLUCOMTR-MCNC: 179 MG/DL — HIGH (ref 70–99)
GLUCOSE BLDC GLUCOMTR-MCNC: 198 MG/DL — HIGH (ref 70–99)
GLUCOSE BLDC GLUCOMTR-MCNC: 219 MG/DL — HIGH (ref 70–99)
GLUCOSE SERPL-MCNC: 173 MG/DL — HIGH (ref 70–99)
HCT VFR BLD CALC: 39.7 % — SIGNIFICANT CHANGE UP (ref 37–47)
HGB BLD-MCNC: 13 G/DL — SIGNIFICANT CHANGE UP (ref 12–16)
IMM GRANULOCYTES NFR BLD AUTO: 0.3 % — SIGNIFICANT CHANGE UP (ref 0.1–0.3)
LYMPHOCYTES # BLD AUTO: 0.8 K/UL — LOW (ref 1.2–3.4)
LYMPHOCYTES # BLD AUTO: 12.5 % — LOW (ref 20.5–51.1)
MAGNESIUM SERPL-MCNC: 1.7 MG/DL — LOW (ref 1.8–2.4)
MCHC RBC-ENTMCNC: 31.9 PG — HIGH (ref 27–31)
MCHC RBC-ENTMCNC: 32.7 G/DL — SIGNIFICANT CHANGE UP (ref 32–37)
MCV RBC AUTO: 97.3 FL — SIGNIFICANT CHANGE UP (ref 81–99)
MONOCYTES # BLD AUTO: 0.9 K/UL — HIGH (ref 0.1–0.6)
MONOCYTES NFR BLD AUTO: 14.1 % — HIGH (ref 1.7–9.3)
NEUTROPHILS # BLD AUTO: 4.53 K/UL — SIGNIFICANT CHANGE UP (ref 1.4–6.5)
NEUTROPHILS NFR BLD AUTO: 71.1 % — SIGNIFICANT CHANGE UP (ref 42.2–75.2)
NRBC # BLD: 0 /100 WBCS — SIGNIFICANT CHANGE UP (ref 0–0)
PHOSPHATE SERPL-MCNC: 3.7 MG/DL — SIGNIFICANT CHANGE UP (ref 2.1–4.9)
PLATELET # BLD AUTO: 206 K/UL — SIGNIFICANT CHANGE UP (ref 130–400)
POTASSIUM SERPL-MCNC: 5.1 MMOL/L — HIGH (ref 3.5–5)
POTASSIUM SERPL-SCNC: 5.1 MMOL/L — HIGH (ref 3.5–5)
RBC # BLD: 4.08 M/UL — LOW (ref 4.2–5.4)
RBC # FLD: 13 % — SIGNIFICANT CHANGE UP (ref 11.5–14.5)
SODIUM SERPL-SCNC: 138 MMOL/L — SIGNIFICANT CHANGE UP (ref 135–146)
WBC # BLD: 6.38 K/UL — SIGNIFICANT CHANGE UP (ref 4.8–10.8)
WBC # FLD AUTO: 6.38 K/UL — SIGNIFICANT CHANGE UP (ref 4.8–10.8)

## 2019-01-25 RX ORDER — MAGNESIUM SULFATE 500 MG/ML
2 VIAL (ML) INJECTION ONCE
Qty: 0 | Refills: 0 | Status: COMPLETED | OUTPATIENT
Start: 2019-01-25 | End: 2019-01-25

## 2019-01-25 RX ORDER — LANOLIN ALCOHOL/MO/W.PET/CERES
5 CREAM (GRAM) TOPICAL ONCE
Qty: 0 | Refills: 0 | Status: DISCONTINUED | OUTPATIENT
Start: 2019-01-25 | End: 2019-01-26

## 2019-01-25 RX ADMIN — Medication 50 GRAM(S): at 15:01

## 2019-01-25 RX ADMIN — RIVAROXABAN 20 MILLIGRAM(S): KIT at 21:48

## 2019-01-25 RX ADMIN — INSULIN GLARGINE 14 UNIT(S): 100 INJECTION, SOLUTION SUBCUTANEOUS at 21:58

## 2019-01-25 RX ADMIN — Medication 50 MILLIGRAM(S): at 06:24

## 2019-01-25 RX ADMIN — PANTOPRAZOLE SODIUM 40 MILLIGRAM(S): 20 TABLET, DELAYED RELEASE ORAL at 06:24

## 2019-01-25 RX ADMIN — SIMVASTATIN 40 MILLIGRAM(S): 20 TABLET, FILM COATED ORAL at 21:47

## 2019-01-25 RX ADMIN — Medication 100 MILLIGRAM(S): at 21:48

## 2019-01-25 RX ADMIN — Medication 50 MILLIGRAM(S): at 16:32

## 2019-01-25 NOTE — PROGRESS NOTE ADULT - SUBJECTIVE AND OBJECTIVE BOX
SUBJECTIVE:    EGD today.    PAST MEDICAL & SURGICAL HISTORY  Atrial fibrillation  High blood cholesterol  Hypertension  Diabetes mellitus, type 2  History of hip surgery    SOCIAL HISTORY:  Negative for smoking/alcohol/drug use.     ALLERGIES:  No Known Drug Allergies  Originally Entered as [RASH] reaction to [cashew nut] (Unknown)    MEDICATIONS:  STANDING MEDICATIONS  diltiazem    milliGRAM(s) Oral daily  docusate sodium 100 milliGRAM(s) Oral three times a day  insulin glargine Injectable (LANTUS) 14 Unit(s) SubCutaneous at bedtime  melatonin 5 milliGRAM(s) Oral once  metoprolol tartrate 50 milliGRAM(s) Oral two times a day  pantoprazole    Tablet 40 milliGRAM(s) Oral before breakfast  rivaroxaban 20 milliGRAM(s) Oral every 24 hours  simvastatin 40 milliGRAM(s) Oral at bedtime  sodium chloride 0.9%. 1000 milliLiter(s) IV Continuous <Continuous>    PRN MEDICATIONS  ondansetron    Tablet 4 milliGRAM(s) Oral three times a day PRN    VITALS:   T(F): 97  HR: 88  BP: 124/86  RR: 20  SpO2: 98%    LABS:                        13.0   6.38  )-----------( 206      ( 25 Jan 2019 06:09 )             39.7     01-25    138  |  100  |  13  ----------------------------<  173<H>  5.1<H>   |  24  |  0.7    Ca    9.1      25 Jan 2019 06:09  Phos  3.7     01-25  Mg     1.7     01-25    TPro  5.7<L>  /  Alb  3.7  /  TBili  0.3  /  DBili  x   /  AST  27  /  ALT  76<H>  /  AlkPhos  132<H>  01-24    Urinalysis Basic - ( 24 Jan 2019 14:45 )    Color: Yellow / Appearance: Turbid / SG: >=1.030 / pH: x  Gluc: x / Ketone: Trace  / Bili: Negative / Urobili: 1.0   Blood: x / Protein: 100 / Nitrite: Negative   Leuk Esterase: Large / RBC: 1-2 /HPF / WBC >50 /HPF   Sq Epi: x / Non Sq Epi: x / Bacteria: x    Lactate, Blood: 1.5 mmol/L (01-24-19 @ 14:45)  Troponin T, Serum: <0.01 ng/mL (01-24-19 @ 14:43)    CARDIAC MARKERS ( 24 Jan 2019 14:43 )  x     / <0.01 ng/mL / x     / x     / x        PHYSICAL EXAM:  GEN: NAD, comfortable  LUNGS: CTAB, no w/r/r  HEART: RRR, no m/r/g  ABD: soft, NT/ND, +BS  EXT: no edema, PP b/l  NEURO: AAOx3

## 2019-01-25 NOTE — PROGRESS NOTE ADULT - ASSESSMENT
chronic abdominal pain of unclear etiology  chronic nausea  h/o HTN, ASHD, CAD, paroxysmal AFIB, AC with Xarelto  h/o HLD  h/o DMII  h/o OA, DDD, DJD, s/p left hip arthroplasty    GI consult for EGD  continue all meds, Xarelto on hold  reviewed sonogram of abdomen and CT pelvis/abdomen which showed no acute intraabdominal pathology

## 2019-01-25 NOTE — PROGRESS NOTE ADULT - ASSESSMENT
89 y/o female with pmh of afib on xarelto, HTN, DM, HLD, cholecystectomy, seen in the ER at [facility] yesterday (1/24/2019) for abdominal pain and vomiting, sent by Dr. Mooney office for evaluation and admission.    #) Abdominal pain, for 2 months duration  - GI consulted (Dr Mooney) - patient going for EGD today  - c/w pantoprazole 40mg daily  - NPO until EGD results    #) Afib - rate controlled, c/w BB + CCB + Xarelto    #) HTN - stable, c/w BB + CCB    #) DM - fs well controlled now, c/w basal insulin until eating PO again, holding home oral agents    #) DLD - c/w Lipitor    Activity: Ambulate as tolerated   Diet: NPO for now  GI ppx: on Pantoprazole 40mg daily  DVT ppx: on Xarelto  Full code  dispo: from home

## 2019-01-25 NOTE — CONSULT NOTE ADULT - SUBJECTIVE AND OBJECTIVE BOX
Chief Complaint: Patient is a 88y old  Female who presents with a chief complaint of Abd pain for 2 months (24 Jan 2019 20:27)      HPI:89 yo diabetic with afib and several week h/upper abdo pain - sxs for at least several weeks. s/p cholecystectomy    Medications:  diltiazem    milliGRAM(s) Oral daily  docusate sodium 100 milliGRAM(s) Oral three times a day  insulin glargine Injectable (LANTUS) 14 Unit(s) SubCutaneous at bedtime  melatonin 5 milliGRAM(s) Oral once  metoprolol tartrate 50 milliGRAM(s) Oral two times a day  ondansetron    Tablet 4 milliGRAM(s) Oral three times a day PRN  pantoprazole    Tablet 40 milliGRAM(s) Oral before breakfast  rivaroxaban 20 milliGRAM(s) Oral every 24 hours  simvastatin 40 milliGRAM(s) Oral at bedtime  sodium chloride 0.9%. 1000 milliLiter(s) IV Continuous <Continuous>      PMHX/PSHX:  Atrial fibrillation  High blood cholesterol  Hypertension  Diabetes mellitus, type 2  History of hip surgery      Family history:  No pertinent family history in first degree relatives      Social History:     Allergies:  No Known Drug Allergies  Originally Entered as [RASH] reaction to [cashew nut] (Unknown)        Review of Systems:  General:  No wt loss, fevers, chills, night sweats, fatigue or pruritis.  Eyes:  Good vision, no reported pain or redness.  ENT:  No sore throat, pain, runny nose, or difficulty swallowing  CV:  No pain, palpitations, hypo/hypertension  Resp:  No dyspnea, cough, tachypnea, wheezing  GI: +, nausea, vomiting, dysphagia, heartburn, diarrhea, constipation, or weight loss. , No rectal bleeding, tarry stools, or hematemesis.  :  No pain, bleeding/discharges, incontinence, nocturia  Musculoskeletal:  No pain, weakness or fasciculations.  Neuro:  No weakness, tingling, memory problems or paresthesias  Psych:  No fatigue, insomnia, mood problems, depression  Endocrine:  No polyuria, polydipsia, cold/heat intolerance  Heme:  No petechiae, ecchymosis, easy bruisability  Skin:  No rash, pruritis, tattoos, scars, or edema      PHYSICAL EXAM:   Vital Signs:  Vital Signs Last 24 Hrs  T(C): 36.1 (25 Jan 2019 11:05), Max: 37.2 (24 Jan 2019 13:49)  T(F): 97 (25 Jan 2019 11:05), Max: 99 (24 Jan 2019 13:49)  HR: 88 (25 Jan 2019 11:05) (88 - 108)  BP: 124/86 (25 Jan 2019 11:05) (118/56 - 179/65)  BP(mean): --  RR: 20 (25 Jan 2019 11:05) (18 - 22)  SpO2: 98% (25 Jan 2019 11:05) (95% - 98%)  Daily Height in cm: 167.64 (25 Jan 2019 11:05)    Daily     T(C): 36.1 (01-25-19 @ 11:05), Max: 37.2 (01-24-19 @ 13:49)  HR: 88 (01-25-19 @ 11:05) (88 - 108)  BP: 124/86 (01-25-19 @ 11:05) (118/56 - 179/65)  RR: 20 (01-25-19 @ 11:05) (18 - 22)  SpO2: 98% (01-25-19 @ 11:05) (95% - 98%)    GENERAL:  Appears stated age, well-groomed, well-nourished, no distress  HEENT:  Conjunctivae clear and pink, no thyromegaly, nodules, adenopathy, no JVD, sclera -anicteric  CHEST:  Full & symmetric excursion, no increased effort, breath sounds clear  HEART:  Regular rhythm, S1, S2, no murmur/rub/S3/S4, no abdominal bruit, no edema  ABDOMEN:  Soft, non-tender, non-distended, normoactive bowel sounds,  no masses ,no hepato-splenomegaly, no signs of chronic liver disease  EXTEREMITIES:  no cyanosis,clubbing or edema  SKIN:  No rash/erythema/ecchymoses/petechiae/wounds/abscess/warm/dry  NEURO:  Alert, oriented, no asterixis, no tremor, no encephalopathy    LABS:                        13.0   6.38  )-----------( 206      ( 25 Jan 2019 06:09 )             39.7     01-25    138  |  100  |  13  ----------------------------<  173<H>  5.1<H>   |  24  |  0.7    Ca    9.1      25 Jan 2019 06:09  Phos  3.7     01-25  Mg     1.7     01-25    TPro  5.7<L>  /  Alb  3.7  /  TBili  0.3  /  DBili  x   /  AST  27  /  ALT  76<H>  /  AlkPhos  132<H>  01-24    LIVER FUNCTIONS - ( 24 Jan 2019 14:45 )  Alb: 3.7 g/dL / Pro: 5.7 g/dL / ALK PHOS: 132 U/L / ALT: 76 U/L / AST: 27 U/L / GGT: x               Amylase Serum--      Lipase serum30       Ammonia--    GGT--        Imaging:      Assessment and Plan:

## 2019-01-25 NOTE — PROGRESS NOTE ADULT - SUBJECTIVE AND OBJECTIVE BOX
Abd pain for 2 months	  History of Present Illness: 	  89 y/o female with pmh of afib on xarelto, HTN, DM, HLD, cholecystectomy, seen in the ER at [facility] yesterday (1/24/2019) for abdominal pain and vomiting, sent by Dr. Mooney office for evaluation and admission.  Patient has the ongoing chief complaint of  abdominal pain which has been ongoing for 2 months.  The pain is diffuse and is not relieved by any particular treatment but is interrmittent.  She explains that the pain does not radiate and is not exacerbated by anything. Patient states that today she was able to tolerate a bowl of soup which is different from her baseline where she usually has difficulty tolerating all food in general.  . She states she called her GI office and was told to come to the ED for evaluation and admission for EGD this week  The patient also reports no changes in her bowel movements, no fever, no chills, no headache, no back pain, no chest pain, no dysuria.       Allergies and Intolerances:        Allergies:  	No Known Drug Allergies:   	Originally Entered as [RASH] reaction to [cashew nut]: Miscellaneous, Unknown, Originally Entered as [RASH] reaction to [cashew nut]    Home Medications:   * Patient Currently Takes Medications as of 23-Jan-2019 16:23 documented in Structured Notes  · 	Zofran 4 mg oral tablet: 1 tab(s) orally every 8 hours   · 	docusate sodium 100 mg oral capsule: 1 cap(s) orally 3 times a day  · 	rivaroxaban 20 mg oral tablet: 1 tab(s) orally every 24 hours  · 	dilTIAZem 300 mg/24 hours oral capsule, extended release: 1 cap(s) orally once a day  · 	metFORMIN 850 mg oral tablet: orally 2 times a day  · 	simvastatin 40 mg oral tablet: 1 tab(s) orally once a day (at bedtime)  · 	Protonix 40 mg oral delayed release tablet: 1 tab(s) orally once a day  · 	Metoprolol Tartrate 50 mg oral tablet: 1 tab(s) orally 2 times a day  · 	glimepiride 2 mg oral tablet: 1 tab(s) orally once a day    .    Patient History:   Atrial fibrillation    Diabetes mellitus, type 2    High blood cholesterol    Hypertension.     Past Surgical History:  History of hip surgery.    Physical Exam:  patient alert, elderly white female uncomfortable but in no acute distress  NECK: supple, no JVD no bruit  RESP: shallow respirations, no crackles, rales or wheezing  CARDIO: S1 S2 audible, rate regular  ABDOMEN: globose, soft; tenderness in epigastrium on deep palpation  EXTREMITIES: advanced arthritic changes, moves all limbs    Imaging:  CXR: no acute cardiopulmonary disease  EKG: normal sinus rhythm, 95 bpm, occasional PACs with bifasicular hemiblock  US Abdomen: s/p cholecystectomy, no acute pathology  CT abdomen/pelvis: no acute pathology

## 2019-01-26 VITALS
RESPIRATION RATE: 16 BRPM | SYSTOLIC BLOOD PRESSURE: 154 MMHG | DIASTOLIC BLOOD PRESSURE: 70 MMHG | TEMPERATURE: 96 F | HEART RATE: 50 BPM

## 2019-01-26 LAB
ANION GAP SERPL CALC-SCNC: 13 MMOL/L — SIGNIFICANT CHANGE UP (ref 7–14)
BASOPHILS # BLD AUTO: 0.01 K/UL — SIGNIFICANT CHANGE UP (ref 0–0.2)
BASOPHILS NFR BLD AUTO: 0.2 % — SIGNIFICANT CHANGE UP (ref 0–1)
BUN SERPL-MCNC: 9 MG/DL — LOW (ref 10–20)
CALCIUM SERPL-MCNC: 8.9 MG/DL — SIGNIFICANT CHANGE UP (ref 8.5–10.1)
CHLORIDE SERPL-SCNC: 97 MMOL/L — LOW (ref 98–110)
CO2 SERPL-SCNC: 28 MMOL/L — SIGNIFICANT CHANGE UP (ref 17–32)
CREAT SERPL-MCNC: 0.9 MG/DL — SIGNIFICANT CHANGE UP (ref 0.7–1.5)
EOSINOPHIL # BLD AUTO: 0.13 K/UL — SIGNIFICANT CHANGE UP (ref 0–0.7)
EOSINOPHIL NFR BLD AUTO: 2.5 % — SIGNIFICANT CHANGE UP (ref 0–8)
GLUCOSE BLDC GLUCOMTR-MCNC: 116 MG/DL — HIGH (ref 70–99)
GLUCOSE BLDC GLUCOMTR-MCNC: 175 MG/DL — HIGH (ref 70–99)
GLUCOSE SERPL-MCNC: 226 MG/DL — HIGH (ref 70–99)
HCT VFR BLD CALC: 37.7 % — SIGNIFICANT CHANGE UP (ref 37–47)
HGB BLD-MCNC: 12.3 G/DL — SIGNIFICANT CHANGE UP (ref 12–16)
IMM GRANULOCYTES NFR BLD AUTO: 0.4 % — HIGH (ref 0.1–0.3)
LYMPHOCYTES # BLD AUTO: 0.72 K/UL — LOW (ref 1.2–3.4)
LYMPHOCYTES # BLD AUTO: 14.1 % — LOW (ref 20.5–51.1)
MAGNESIUM SERPL-MCNC: 2.1 MG/DL — SIGNIFICANT CHANGE UP (ref 1.8–2.4)
MCHC RBC-ENTMCNC: 31.2 PG — HIGH (ref 27–31)
MCHC RBC-ENTMCNC: 32.6 G/DL — SIGNIFICANT CHANGE UP (ref 32–37)
MCV RBC AUTO: 95.7 FL — SIGNIFICANT CHANGE UP (ref 81–99)
MONOCYTES # BLD AUTO: 0.59 K/UL — SIGNIFICANT CHANGE UP (ref 0.1–0.6)
MONOCYTES NFR BLD AUTO: 11.5 % — HIGH (ref 1.7–9.3)
NEUTROPHILS # BLD AUTO: 3.65 K/UL — SIGNIFICANT CHANGE UP (ref 1.4–6.5)
NEUTROPHILS NFR BLD AUTO: 71.3 % — SIGNIFICANT CHANGE UP (ref 42.2–75.2)
NRBC # BLD: 0 /100 WBCS — SIGNIFICANT CHANGE UP (ref 0–0)
PLATELET # BLD AUTO: 192 K/UL — SIGNIFICANT CHANGE UP (ref 130–400)
POTASSIUM SERPL-MCNC: 4.3 MMOL/L — SIGNIFICANT CHANGE UP (ref 3.5–5)
POTASSIUM SERPL-SCNC: 4.3 MMOL/L — SIGNIFICANT CHANGE UP (ref 3.5–5)
RBC # BLD: 3.94 M/UL — LOW (ref 4.2–5.4)
RBC # FLD: 12.7 % — SIGNIFICANT CHANGE UP (ref 11.5–14.5)
SODIUM SERPL-SCNC: 138 MMOL/L — SIGNIFICANT CHANGE UP (ref 135–146)
WBC # BLD: 5.12 K/UL — SIGNIFICANT CHANGE UP (ref 4.8–10.8)
WBC # FLD AUTO: 5.12 K/UL — SIGNIFICANT CHANGE UP (ref 4.8–10.8)

## 2019-01-26 RX ORDER — ACETAMINOPHEN 500 MG
650 TABLET ORAL EVERY 6 HOURS
Qty: 0 | Refills: 0 | Status: DISCONTINUED | OUTPATIENT
Start: 2019-01-26 | End: 2019-01-26

## 2019-01-26 RX ORDER — CHLORHEXIDINE GLUCONATE 213 G/1000ML
1 SOLUTION TOPICAL
Qty: 0 | Refills: 0 | Status: DISCONTINUED | OUTPATIENT
Start: 2019-01-26 | End: 2019-01-26

## 2019-01-26 RX ORDER — SUCRALFATE 1 G
1 TABLET ORAL
Qty: 120 | Refills: 0
Start: 2019-01-26 | End: 2019-02-24

## 2019-01-26 RX ADMIN — Medication 100 MILLIGRAM(S): at 05:32

## 2019-01-26 RX ADMIN — Medication 650 MILLIGRAM(S): at 05:38

## 2019-01-26 RX ADMIN — Medication 50 MILLIGRAM(S): at 05:34

## 2019-01-26 RX ADMIN — PANTOPRAZOLE SODIUM 40 MILLIGRAM(S): 20 TABLET, DELAYED RELEASE ORAL at 05:34

## 2019-01-26 RX ADMIN — CHLORHEXIDINE GLUCONATE 1 APPLICATION(S): 213 SOLUTION TOPICAL at 05:33

## 2019-01-26 RX ADMIN — Medication 100 MILLIGRAM(S): at 14:10

## 2019-01-26 RX ADMIN — Medication 300 MILLIGRAM(S): at 05:33

## 2019-01-26 NOTE — DISCHARGE NOTE ADULT - CARE PLAN
Principal Discharge DX:	Abdominal pain  Goal:	Evaluation and therapy  Assessment and plan of treatment:	You underwent endoscopic procedure that revealed   Normal mucosa in the whole esophagus.    Polyps (2 mm to 10 mm) in the cardia and stomach body. (Biopsy).    Normal mucosa in the whole examine duodenum.  Please follow up with   Secondary Diagnosis:	Atrial fibrillation Principal Discharge DX:	Abdominal pain  Goal:	Evaluation and therapy  Assessment and plan of treatment:	You underwent endoscopic procedure that revealed   Normal mucosa in the whole esophagus.    Polyps (2 mm to 10 mm) in the cardia and stomach body. (Biopsy).    Normal mucosa in the whole examine duodenum.  Please follow up with Dr. Vidal with two weeks of discharge.  Secondary Diagnosis:	Atrial fibrillation  Goal:	Evaluation and therapy  Assessment and plan of treatment:	Please continue home medication Principal Discharge DX:	Abdominal pain  Goal:	Evaluation and therapy  Assessment and plan of treatment:	You underwent endoscopic procedure that revealed   Normal mucosa in the whole esophagus.    Polyps (2 mm to 10 mm) in the cardia and stomach body. (Biopsy).    Normal mucosa in the whole examine duodenum.  Please follow up with Dr. Vidal with two weeks of discharge.  Please start taking Carafate 1g before meals and at bed times.  Secondary Diagnosis:	Atrial fibrillation  Goal:	Evaluation and therapy  Assessment and plan of treatment:	Please continue home medication

## 2019-01-26 NOTE — DISCHARGE NOTE ADULT - HOSPITAL COURSE
Ms. Gonzalez  is a 89 y/o female with pmh of afib on xarelto, HTN, DM, HLD, cholecystectomy, seen in the ER at [facility] yesterday (1/24/2019) for abdominal pain and vomiting, sent by Dr. Mooney office for evaluation and admission.  Patient complained of ongoing  abdominal pain which has been ongoing for 2 months.  The pain is diffuse and is not relieved by any particular treatment but is Please Limit daily sodium intake to 1.5 grams daily or 1/2 tea spoon of salt. Avoid foods high in cholesterol. Please make a concerted effort to stay hydrated at all times.errmittent.  She explains that the pain does not radiate and is not exacerbated by anything. Patient states that  she was able to tolerate a bowl of soup which is different from her baseline where she usually has difficulty tolerating all food in general.  . She states she called her GI office and was told to come to the ED for evaluation and admission for EGD this week  The patient also reports no changes in her bowel movements, no fever, no chills, no headache, no back pain, no chest pain, no dysuria.     Admitted for abdominal pain  underwent EGD findings  Normal mucosa in the whole esophagus. Polyps (2 mm to 10 mm) in the cardia and stomach body. (Biopsy).    Normal mucosa in the whole examine duodenum.  Patient is hemodynamically stable with no systemic signs of infection and stable for discharge today. GI recommends low fiber/low residue diet. F/u OP GI. Ms. Gonzalez  is a 89 y/o female with pmh of afib on xarelto, HTN, DM, HLD, cholecystectomy, seen in the ER at [facility] yesterday (1/24/2019) for abdominal pain and vomiting, sent by Dr. Mooney office for evaluation and admission.  Patient complained of ongoing  abdominal pain which has been ongoing for 2 months.  The pain is diffuse and is not relieved by any particular treatment but is Please Limit daily sodium intake to 1.5 grams daily or 1/2 tea spoon of salt. Avoid foods high in cholesterol. Please make a concerted effort to stay hydrated at all times.errmittent.  She explains that the pain does not radiate and is not exacerbated by anything. Patient states that  she was able to tolerate a bowl of soup which is different from her baseline where she usually has difficulty tolerating all food in general.  . She states she called her GI office and was told to come to the ED for evaluation and admission for EGD this week  The patient also reports no changes in her bowel movements, no fever, no chills, no headache, no back pain, no chest pain, no dysuria.     Admitted for abdominal pain  underwent EGD findings  Normal mucosa in the whole esophagus. Polyps (2 mm to 10 mm) in the cardia and stomach body. (Biopsy).    Normal mucosa in the whole examine duodenum.  Patient is hemodynamically stable with no systemic signs of infection and stable for discharge today. GI recommends low fiber/low residue diet. F/u OP GI.    Carafate 1g before meals and at bed times.

## 2019-01-26 NOTE — DISCHARGE NOTE ADULT - PATIENT PORTAL LINK FT
You can access the [facility], offered by [facility], by registering with the following website: [email or web address]

## 2019-01-26 NOTE — DISCHARGE NOTE ADULT - MEDICATION SUMMARY - MEDICATIONS TO TAKE
I will START or STAY ON the medications listed below when I get home from the hospital:    dilTIAZem 300 mg/24 hours oral capsule, extended release  -- 1 cap(s) by mouth once a day  -- Indication: For Afib     rivaroxaban 20 mg oral tablet  -- 1 tab(s) by mouth every 24 hours  -- Indication: For Afib     metFORMIN 850 mg oral tablet  -- orally 2 times a day  -- Indication: For Diabetes     glimepiride 2 mg oral tablet  -- 1 tab(s) by mouth once a day  -- Indication: For diabetes     simvastatin 40 mg oral tablet  -- 1 tab(s) by mouth once a day (at bedtime)  -- Indication: For Hyperlipidemia     Metoprolol Tartrate 50 mg oral tablet  -- 1 tab(s) by mouth 2 times a day  -- Indication: For Afib    docusate sodium 100 mg oral capsule  -- 1 cap(s) by mouth 3 times a day  -- Indication: For constipation    Protonix 40 mg oral delayed release tablet  -- 1 tab(s) by mouth once a day  -- Indication: For GERD I will START or STAY ON the medications listed below when I get home from the hospital:    dilTIAZem 300 mg/24 hours oral capsule, extended release  -- 1 cap(s) by mouth once a day  -- Indication: For Afib     rivaroxaban 20 mg oral tablet  -- 1 tab(s) by mouth every 24 hours  -- Indication: For Afib     metFORMIN 850 mg oral tablet  -- orally 2 times a day  -- Indication: For Diabetes     glimepiride 2 mg oral tablet  -- 1 tab(s) by mouth once a day  -- Indication: For diabetes     simvastatin 40 mg oral tablet  -- 1 tab(s) by mouth once a day (at bedtime)  -- Indication: For Hyperlipidemia     Metoprolol Tartrate 50 mg oral tablet  -- 1 tab(s) by mouth 2 times a day  -- Indication: For Afib    docusate sodium 100 mg oral capsule  -- 1 cap(s) by mouth 3 times a day  -- Indication: For constipation    Carafate 1 g oral tablet  -- 1 tab(s) by mouth 4 times a day (before meals and at bedtime)   -- Do not take dairy products, antacids, or iron preparations within one hour of this medication.  It is very important that you take or use this exactly as directed.  Do not skip doses or discontinue unless directed by your doctor.  Take medication on an empty stomach 1 hour before or 2 to 3 hours after a meal unless otherwise directed by your doctor.    -- Indication: For Abdominal pain    Protonix 40 mg oral delayed release tablet  -- 1 tab(s) by mouth once a day  -- Indication: For GERD

## 2019-01-26 NOTE — DISCHARGE NOTE ADULT - CARE PROVIDER_API CALL
Migdalia Villa), Medicine  [address] [place] [address]  Phone: [phone]  Fax: [phone]    Ivan Vidal), Gastroenterology; Internal Medicine  [address] [place] [address]  Phone: [phone]  Fax: [phone] Migdalia Villa), Medicine  [address] [place] [address]  Phone: [phone]  Fax: [phone]    Ivan Vidal), Gastroenterology; Internal Medicine  [address] [place] [address]  Phone: [phone]  Fax: [phone]    Yandel Rubio), Internal Medicine  [address] [place] [address]  Phone: [phone]  Fax: [phone]

## 2019-01-26 NOTE — DISCHARGE NOTE ADULT - PLAN OF CARE
Evaluation and therapy You underwent endoscopic procedure that revealed   Normal mucosa in the whole esophagus.    Polyps (2 mm to 10 mm) in the cardia and stomach body. (Biopsy).    Normal mucosa in the whole examine duodenum.  Please follow up with  You underwent endoscopic procedure that revealed   Normal mucosa in the whole esophagus.    Polyps (2 mm to 10 mm) in the cardia and stomach body. (Biopsy).    Normal mucosa in the whole examine duodenum.  Please follow up with Dr. Vidal with two weeks of discharge. Please continue home medication You underwent endoscopic procedure that revealed   Normal mucosa in the whole esophagus.    Polyps (2 mm to 10 mm) in the cardia and stomach body. (Biopsy).    Normal mucosa in the whole examine duodenum.  Please follow up with Dr. Vidal with two weeks of discharge.  Please start taking Carafate 1g before meals and at bed times.

## 2019-01-26 NOTE — DISCHARGE NOTE ADULT - OTHER SIGNIFICANT FINDINGS
Normal mucosa in the whole esophagus.    Polyps (2 mm to 10 mm) in the cardia and stomach body. (Biopsy).    Normal mucosa in the whole examine duodenum.

## 2019-01-26 NOTE — DISCHARGE NOTE ADULT - PROVIDER TOKENS
TOKEN:'[id number]:'[id number]' TOKEN:'[id number]:'[id number]:'[id number]'

## 2019-01-26 NOTE — DISCHARGE NOTE ADULT - ADDITIONAL INSTRUCTIONS
Please, with in two weeks of discharge follow up with Dr. Vidal  Please, with in two weeks of discharge follow up with Dr. Villa Please, with in two weeks of discharge follow up with Dr. Vidal  Please, with in two weeks of discharge follow up with Dr. Rubio

## 2019-01-26 NOTE — DISCHARGE NOTE ADULT - INSTRUCTIONS
Please Limit daily sodium intake to 1.5 grams daily or 1/2 tea spoon of salt. Avoid foods high in cholesterol. Please make a concerted effort to stay hydrated at all times. Please eat low fiber/low residue diet

## 2019-01-26 NOTE — DISCHARGE NOTE ADULT - CARE PROVIDERS DIRECT ADDRESSES
,DirectAddress_Unknown,DirectAddress_Unknown ,DirectAddress_Unknown,DirectAddress_Unknown,[email or web address]

## 2019-01-29 LAB — SURGICAL PATHOLOGY STUDY: SIGNIFICANT CHANGE UP

## 2019-01-30 DIAGNOSIS — I10 ESSENTIAL (PRIMARY) HYPERTENSION: ICD-10-CM

## 2019-01-30 DIAGNOSIS — R11.2 NAUSEA WITH VOMITING, UNSPECIFIED: ICD-10-CM

## 2019-01-30 DIAGNOSIS — K31.7 POLYP OF STOMACH AND DUODENUM: ICD-10-CM

## 2019-01-30 DIAGNOSIS — E78.00 PURE HYPERCHOLESTEROLEMIA, UNSPECIFIED: ICD-10-CM

## 2019-01-30 DIAGNOSIS — K21.9 GASTRO-ESOPHAGEAL REFLUX DISEASE WITHOUT ESOPHAGITIS: ICD-10-CM

## 2019-01-30 DIAGNOSIS — I48.91 UNSPECIFIED ATRIAL FIBRILLATION: ICD-10-CM

## 2019-01-30 DIAGNOSIS — Z90.49 ACQUIRED ABSENCE OF OTHER SPECIFIED PARTS OF DIGESTIVE TRACT: ICD-10-CM

## 2019-01-30 DIAGNOSIS — K59.00 CONSTIPATION, UNSPECIFIED: ICD-10-CM

## 2019-01-30 DIAGNOSIS — R10.84 GENERALIZED ABDOMINAL PAIN: ICD-10-CM

## 2019-02-21 PROBLEM — I48.91 UNSPECIFIED ATRIAL FIBRILLATION: Chronic | Status: ACTIVE | Noted: 2019-01-23

## 2019-03-19 ENCOUNTER — INPATIENT (INPATIENT)
Facility: HOSPITAL | Age: 84
LOS: 3 days | Discharge: HOME HEALTH | End: 2019-03-23
Attending: INTERNAL MEDICINE | Admitting: INTERNAL MEDICINE
Payer: MEDICARE

## 2019-03-19 VITALS
RESPIRATION RATE: 18 BRPM | DIASTOLIC BLOOD PRESSURE: 77 MMHG | TEMPERATURE: 98 F | SYSTOLIC BLOOD PRESSURE: 140 MMHG | WEIGHT: 160.06 LBS | HEART RATE: 140 BPM | HEIGHT: 66 IN

## 2019-03-19 DIAGNOSIS — Z98.890 OTHER SPECIFIED POSTPROCEDURAL STATES: Chronic | ICD-10-CM

## 2019-03-19 LAB
APTT BLD: 133.6 SEC — CRITICAL HIGH (ref 27–39.2)
GLUCOSE BLDC GLUCOMTR-MCNC: 193 MG/DL — HIGH (ref 70–99)

## 2019-03-19 RX ORDER — DILTIAZEM HCL 120 MG
300 CAPSULE, EXT RELEASE 24 HR ORAL DAILY
Qty: 0 | Refills: 0 | Status: DISCONTINUED | OUTPATIENT
Start: 2019-03-19 | End: 2019-03-23

## 2019-03-19 RX ORDER — DEXTROSE MONOHYDRATE, SODIUM CHLORIDE, AND POTASSIUM CHLORIDE 50; .745; 4.5 G/1000ML; G/1000ML; G/1000ML
1000 INJECTION, SOLUTION INTRAVENOUS
Qty: 0 | Refills: 0 | Status: DISCONTINUED | OUTPATIENT
Start: 2019-03-19 | End: 2019-03-19

## 2019-03-19 RX ORDER — PANTOPRAZOLE SODIUM 20 MG/1
40 TABLET, DELAYED RELEASE ORAL
Qty: 0 | Refills: 0 | Status: DISCONTINUED | OUTPATIENT
Start: 2019-03-19 | End: 2019-03-23

## 2019-03-19 RX ORDER — METOPROLOL TARTRATE 50 MG
50 TABLET ORAL
Qty: 0 | Refills: 0 | Status: DISCONTINUED | OUTPATIENT
Start: 2019-03-19 | End: 2019-03-23

## 2019-03-19 RX ORDER — GLUCAGON INJECTION, SOLUTION 0.5 MG/.1ML
1 INJECTION, SOLUTION SUBCUTANEOUS ONCE
Qty: 0 | Refills: 0 | Status: DISCONTINUED | OUTPATIENT
Start: 2019-03-19 | End: 2019-03-23

## 2019-03-19 RX ORDER — DILTIAZEM HCL 120 MG
300 CAPSULE, EXT RELEASE 24 HR ORAL DAILY
Qty: 0 | Refills: 0 | Status: DISCONTINUED | OUTPATIENT
Start: 2019-03-19 | End: 2019-03-19

## 2019-03-19 RX ORDER — HEPARIN SODIUM 5000 [USP'U]/ML
INJECTION INTRAVENOUS; SUBCUTANEOUS
Qty: 25000 | Refills: 0 | Status: DISCONTINUED | OUTPATIENT
Start: 2019-03-19 | End: 2019-03-19

## 2019-03-19 RX ORDER — SODIUM CHLORIDE 9 MG/ML
1000 INJECTION, SOLUTION INTRAVENOUS
Qty: 0 | Refills: 0 | Status: DISCONTINUED | OUTPATIENT
Start: 2019-03-19 | End: 2019-03-23

## 2019-03-19 RX ORDER — DEXTROSE 50 % IN WATER 50 %
25 SYRINGE (ML) INTRAVENOUS ONCE
Qty: 0 | Refills: 0 | Status: DISCONTINUED | OUTPATIENT
Start: 2019-03-19 | End: 2019-03-23

## 2019-03-19 RX ORDER — HEPARIN SODIUM 5000 [USP'U]/ML
1300 INJECTION INTRAVENOUS; SUBCUTANEOUS
Qty: 25000 | Refills: 0 | Status: DISCONTINUED | OUTPATIENT
Start: 2019-03-19 | End: 2019-03-20

## 2019-03-19 RX ORDER — DEXTROSE 50 % IN WATER 50 %
15 SYRINGE (ML) INTRAVENOUS ONCE
Qty: 0 | Refills: 0 | Status: DISCONTINUED | OUTPATIENT
Start: 2019-03-19 | End: 2019-03-23

## 2019-03-19 RX ORDER — DOCUSATE SODIUM 100 MG
100 CAPSULE ORAL THREE TIMES A DAY
Qty: 0 | Refills: 0 | Status: DISCONTINUED | OUTPATIENT
Start: 2019-03-19 | End: 2019-03-23

## 2019-03-19 RX ORDER — SIMVASTATIN 20 MG/1
40 TABLET, FILM COATED ORAL AT BEDTIME
Qty: 0 | Refills: 0 | Status: DISCONTINUED | OUTPATIENT
Start: 2019-03-19 | End: 2019-03-23

## 2019-03-19 RX ORDER — DEXTROSE 50 % IN WATER 50 %
12.5 SYRINGE (ML) INTRAVENOUS ONCE
Qty: 0 | Refills: 0 | Status: DISCONTINUED | OUTPATIENT
Start: 2019-03-19 | End: 2019-03-23

## 2019-03-19 RX ORDER — METOPROLOL TARTRATE 50 MG
50 TABLET ORAL
Qty: 0 | Refills: 0 | Status: DISCONTINUED | OUTPATIENT
Start: 2019-03-19 | End: 2019-03-19

## 2019-03-19 RX ORDER — SUCRALFATE 1 G
1 TABLET ORAL
Qty: 0 | Refills: 0 | Status: DISCONTINUED | OUTPATIENT
Start: 2019-03-19 | End: 2019-03-23

## 2019-03-19 RX ADMIN — Medication 1 GRAM(S): at 11:36

## 2019-03-19 RX ADMIN — Medication 300 MILLIGRAM(S): at 11:36

## 2019-03-19 RX ADMIN — Medication 50 MILLIGRAM(S): at 11:36

## 2019-03-19 RX ADMIN — HEPARIN SODIUM 13 UNIT(S)/HR: 5000 INJECTION INTRAVENOUS; SUBCUTANEOUS at 21:29

## 2019-03-19 RX ADMIN — SIMVASTATIN 40 MILLIGRAM(S): 20 TABLET, FILM COATED ORAL at 21:28

## 2019-03-19 RX ADMIN — Medication 1 GRAM(S): at 18:05

## 2019-03-19 RX ADMIN — HEPARIN SODIUM 1300 UNIT(S)/HR: 5000 INJECTION INTRAVENOUS; SUBCUTANEOUS at 11:04

## 2019-03-19 RX ADMIN — Medication 100 MILLIGRAM(S): at 15:43

## 2019-03-19 RX ADMIN — Medication 100 MILLIGRAM(S): at 21:28

## 2019-03-19 RX ADMIN — PANTOPRAZOLE SODIUM 40 MILLIGRAM(S): 20 TABLET, DELAYED RELEASE ORAL at 21:29

## 2019-03-19 RX ADMIN — Medication 50 MILLIGRAM(S): at 18:05

## 2019-03-19 NOTE — H&P ADULT - NSHPLABSRESULTS_GEN_ALL_CORE
T(F): 97.5  HR: 138  BP: 136/86  RR: 20  SpO2: --                                      Blood Cultures        RECENT CULTURES:        RESPIRATORY CULTURES:

## 2019-03-19 NOTE — CONSULT NOTE ADULT - ASSESSMENT
89 yo f Hx of Afib on DOAC (held for elective ERCP for choledocholethiasis on MRCP) admitted after she was found to be in uncontrolled rate 2/2 to afib.    Plan:  - Admit to tele  - Cardiology eval for rate control and AC pre ERCP (neeraj)  - Hold Ac pre procedure (heparin for 6 hours ~starting 11 am on 3/20)  - NPO after MN  - Scheduled for an ADD-ON ERCP tomorrow afternoon  - Check CMP, CBC and coags

## 2019-03-19 NOTE — H&P ADULT - NSHPPHYSICALEXAM_GEN_ALL_CORE
T(F): 97.5  HR: 138  BP: 136/86  RR: 20  SpO2: --      PHYSICAL EXAM:  GENERAL: NAD, well-developed  HEAD:  Atraumatic, Normocephalic  EYES: EOMI, PERRLA, conjunctiva and sclera clear  NECK: Supple, No JVD  CHEST/LUNG: Clear to auscultation bilaterally; No wheeze  HEART: Regular rate and rhythm; No murmurs, rubs, or gallops  ABDOMEN: Soft, Nontender, Nondistended; Bowel sounds present  EXTREMITIES:  2+ Peripheral Pulses, No clubbing, cyanosis, or edema  PSYCH: AAOx3  NEUROLOGY: non-focal  SKIN: No rashes or lesions

## 2019-03-19 NOTE — H&P PST ADULT - NSICDXPASTMEDICALHX_GEN_ALL_CORE_FT
PAST MEDICAL HISTORY:  Atrial fibrillation     Diabetes mellitus, type 2     High blood cholesterol     Hypertension

## 2019-03-19 NOTE — PROGRESS NOTE ADULT - ASSESSMENT
Atrial fibrillation with rapid ventricular response  Hx of cholelithiases, for ERCP to R/O choledocholithiases, procedure cancelled due to the afib with RVR  Hx of  HTN, ASHD, afib ( Xarelto)  Hx of Hyperlipidemia  Hx of DM II  Hx of GERD, cholelithiasis, cholecystectomy, diverticulosis  Hx of OA, DDD, DJD, osteoporosis    ERCP postponed  admit to tele and stabilize pt cardiac wise  resume home meds inc cardizem and BB, cont to hold Xarelto  check electrolytes inc Mg  cardio consult  GI ff up, possible ERCP in the AM if stable  NPO after midnight

## 2019-03-19 NOTE — H&P ADULT - NSHPSOCIALHISTORY_GEN_ALL_CORE
Marital Status:  (   )    (   ) Single    (   )    (  )   Lives with: (  ) alone  (  ) children   (  ) spouse   (  ) parents  (  ) other  Recent Travel: No recent travel  Occupation:    Substance Use (street drugs): ( x ) never used  (  ) other:  Tobacco Usage:  ( x  ) never smoked   (   ) former smoker   (   ) current smoker  (     ) pack year  Alcohol Usage: None

## 2019-03-19 NOTE — H&P ADULT - ASSESSMENT
# Afib with RVR ? 2/2 dehydration   - s/p 1L LR  - At home on Cardizem 300mg and Metoprolol 50mg bid  - Off Xarelto for procedure  - Start Heparin drip  - Cardio consult for clearance prior to procedure as requested     # Possible Choledocholithiasis  - pt is being followed by Dr Mooney  - c/w Carafate  - Possible ERCP AM  -NPO past midnight     # HTN  - c/w metoprolol 50mg daily and cardizem 300    # DM  - will hold home oral mediations  - start insulin lantus & add lispro as needed if FS >180    # DLD  - c/w atorvastatin daily    Activity Ambulate as tolerated   diet: carbohydrate consistent otherwise  GI ppx: on pantoprazole 40mg daily  DVT ppx: on xarelto  Full code  dispo: from home 89 y/o female with pmh of afib on xarelto, HTN, DM, HLD, cholecystectomy is being admitted for Afib with RVR.      # Afib with RVR likely 2/2 lack of Rate Controlling agents   -CHADVASC-5  - s/p 1L LR  - At home on Cardizem 300mg and Metoprolol 50mg bid, start meds PO  - Off Xarelto for procedure  - Start Heparin drip  - Afib on the monitor 89-138s w/o sxs  - Cardio consult for clearance prior to procedure as requested by anesthesia     # Possible Choledocholithiasis  - pt is being followed by Dr Mooney  - c/w Carafate  - Possible ERCP AM  - NPO past midnight   - Please ensure pt receives her meds     # HTN  - c/w metoprolol 50mg daily and cardizem 300    # DM  - will hold home oral mediations  - start insulin lantus & add lispro as needed if FS >180    # DLD  - c/w atorvastatin daily    Activity Ambulate as tolerated   diet: carbohydrate consistent DASH diet  GI ppx: on pantoprazole 40mg daily  DVT ppx: start heparin   Full code  dispo: from home

## 2019-03-19 NOTE — CONSULT NOTE ADULT - SUBJECTIVE AND OBJECTIVE BOX
Gastroenterology/Hepatology Consultation    89y Female with choledocholithiasis  Patient is a 89y old  Female who presents with a chief complaint of Choledocholithiasis (19 Mar 2019 08:56)    HPI:  87 y/o female with pmh of afib on xarelto, HTN, DM, HLD, cholecystectomy is being admitted for Afib with RVR.    Pt presented for an o/p ERCP after her recent dx of Choledocholithiasis in 1/19 (on OP MRCP- no records). In the Endoscopy suite, pt was found to have a HR in 140s, BP stable, but her ERCP was cancelled by Anesthesia. Pt is being admitted for cardiology clearance, stabilization and possible ERCP AM.    Pt denied any acute complaints including CP, SOB. She did not take her meds this morning or last night since she was told not to take anything PO. (19 Mar 2019 09:46)      Previous EGD(s): < from: EGD (01.25.19 @ 11:45) >  Impressions:    Normal mucosa in the whole esophagus.    Polyps (2 mm to 10 mm) in the cardia and stomach body. (Biopsy).    Normal mucosa in the whole examinedduodenum.     Plan: Low Fiber/Residue Diet       Ivan Vidal MD    Version 1, Electronically signed on 1/25/2019 1:09:47 PM by Ivan Vidal MD    < end of copied text >      No pertinent family history in first degree relatives      Review of system  General:  (-) weight loss, (-) fevers  Eyes:  (-) visual changes  CV:  (-) chest pain  Resp: (-) SOB, (-) wheezing  GI: (+) abdominal pain,  (-) nausea, (-) vomiting, (-) dysphagia, (-) diarrhea, (-) constipation, (-) rectal bleeding, (-) melena, (-) hematemesis.  Neuro: (-) confusion, (-) weakness  Psych:  (-) Hallucinations  Heme:  (-) easy bruisability    Past medical/surgical Hx:  PAST MEDICAL & SURGICAL HISTORY:  Atrial fibrillation  High blood cholesterol  Hypertension  Diabetes mellitus, type 2  History of hip surgery    Home Medications:  dilTIAZem 300 mg/24 hours oral capsule, extended release: 1 cap(s) orally once a day  docusate sodium 100 mg oral capsule: 1 cap(s) orally 3 times a day  glimepiride 2 mg oral tablet: 1 tab(s) orally once a day  metFORMIN 850 mg oral tablet: orally 2 times a day  Metoprolol Tartrate 50 mg oral tablet: 1 tab(s) orally 2 times a day  Protonix 40 mg oral delayed release tablet: 1 tab(s) orally once a day  rivaroxaban 20 mg oral tablet: 1 tab(s) orally every 24 hours  simvastatin 40 mg oral tablet: 1 tab(s) orally once a day (at bedtime)      Allergies: No Known Drug Allergies  Originally Entered as [RASH] reaction to [cashew nut] (Unknown)      Current Medications:   dextrose 40% Gel 15 Gram(s) Oral once PRN  dextrose 5%. 1000 milliLiter(s) IV Continuous <Continuous>  dextrose 50% Injectable 12.5 Gram(s) IV Push once  dextrose 50% Injectable 25 Gram(s) IV Push once  dextrose 50% Injectable 25 Gram(s) IV Push once  diltiazem    milliGRAM(s) Oral daily  docusate sodium 100 milliGRAM(s) Oral three times a day  glucagon  Injectable 1 milliGRAM(s) IntraMuscular once PRN  heparin  Infusion.  Unit(s)/Hr IV Continuous <Continuous>  lactated ringers. 1000 milliLiter(s) IV Continuous <Continuous>  metoprolol tartrate 50 milliGRAM(s) Oral two times a day  pantoprazole    Tablet 40 milliGRAM(s) Oral before breakfast  simvastatin 40 milliGRAM(s) Oral at bedtime  sucralfate 1 Gram(s) Oral four times a day      Physical exam:  T(C): 36.4 (03-19-19 @ 09:31), Max: 36.4 (03-19-19 @ 09:05)  HR: 125 (03-19-19 @ 11:30) (111 - 140)  BP: 135/75 (03-19-19 @ 11:30) (134/72 - 140/77)  RR: 18 (03-19-19 @ 11:30) (18 - 20)  SpO2: --  GENERAL: NAD  HEAD:  Atraumatic, Normocephalic  EYES: Sclera:  NECK: Supple, no JVD or thyromegaly  CHEST/LUNG: Good bilateral air entry  HEART: normal S1, S2. Regular  ABDOMEN: (-) distended, (-) tender, (-) rebound, () BS, ()HSM  EXTREMITIES:  (-)edema  NEUROLOGY: (-) asterixis  SKIN: (-) jaundice  EVELIA: (-) melena (-) brbpr    Data:    MCV   RDW   HGB trend:            Liver panel trend:    < from: US Abdomen Limited (01.23.19 @ 13:10) >  FINDINGS:    LIVER:  Normal in contour and echogenicity measuring 14.6 cm in length x   12.6 cm AP with no focal mass or dilatation of the intrahepatic bile   ducts.    GALLBLADDER/BILIARY TREE: Status post cholecystectomy with no common bile   duct dilatation measuring 0.75 cm.    . PANCREAS:  Visualized head and body are unremarkable. Remainder   obscured by overlying bowel gas.    KIDNEY:  Right kidney measures 10 cm in length x 4.8 cm in width x 4.8 cm   AP with no hydronephrosis, calculi , perinephric fluid or solid mass.   Right renal vascular flow    AORTA/IVC:  Visualized proximal portions unremarkable.    ASCITES:  No right upper quadrant ascites or right pleural effusion.    IMPRESSION:    Unremarkable right upper quadrant ultrasound. Status post   cholecystectomy, unremarkable.    < end of copied text >

## 2019-03-19 NOTE — H&P ADULT - HISTORY OF PRESENT ILLNESS
89 y/o female with pmh of afib on xarelto, HTN, DM, HLD, cholecystectomy is being admitted for Afib with RVR.    Pt presented for an o/p ERCP after her recent dx of Choledocholithiasis in 1/19. In the Endoscopy suite, pt was found to have a HR in 140s, BP stable, but her ERCP was cancelled. Pt is being admitted for cardiology clearance, stabilization and possible ERCP AM. 89 y/o female with pmh of afib on xarelto, HTN, DM, HLD, cholecystectomy is being admitted for Afib with RVR.    Pt presented for an o/p ERCP after her recent dx of Choledocholithiasis in 1/19. In the Endoscopy suite, pt was found to have a HR in 140s, BP stable, but her ERCP was cancelled by Anesthesia. Pt is being admitted for cardiology clearance, stabilization and possible ERCP AM.    Pt denied any acute complaints including CP, SOB. She did not take her meds this morning or last night since she was told not to take anything PO.

## 2019-03-19 NOTE — PRE-ANESTHESIA EVALUATION ADULT - NSANTHADDINFOFT_GEN_ALL_CORE
Patient has history of a fib, patient on monitor prior to procedure, HR in 140s, BP stable.  ERCP cancelled, pending cardiology clearance.  Recommend  Telemetry bed

## 2019-03-20 LAB
ANION GAP SERPL CALC-SCNC: 12 MMOL/L — SIGNIFICANT CHANGE UP (ref 7–14)
APTT BLD: 125.5 SEC — CRITICAL HIGH (ref 27–39.2)
APTT BLD: 136.1 SEC — CRITICAL HIGH (ref 27–39.2)
APTT BLD: 54.3 SEC — HIGH (ref 27–39.2)
BUN SERPL-MCNC: 17 MG/DL — SIGNIFICANT CHANGE UP (ref 10–20)
CALCIUM SERPL-MCNC: 9.2 MG/DL — SIGNIFICANT CHANGE UP (ref 8.5–10.1)
CHLORIDE SERPL-SCNC: 105 MMOL/L — SIGNIFICANT CHANGE UP (ref 98–110)
CO2 SERPL-SCNC: 24 MMOL/L — SIGNIFICANT CHANGE UP (ref 17–32)
CREAT SERPL-MCNC: 0.7 MG/DL — SIGNIFICANT CHANGE UP (ref 0.7–1.5)
GLUCOSE BLDC GLUCOMTR-MCNC: 145 MG/DL — HIGH (ref 70–99)
GLUCOSE BLDC GLUCOMTR-MCNC: 153 MG/DL — HIGH (ref 70–99)
GLUCOSE BLDC GLUCOMTR-MCNC: 250 MG/DL — HIGH (ref 70–99)
GLUCOSE SERPL-MCNC: 149 MG/DL — HIGH (ref 70–99)
HCT VFR BLD CALC: 35.3 % — LOW (ref 37–47)
HGB BLD-MCNC: 11.4 G/DL — LOW (ref 12–16)
INR BLD: 0.97 RATIO — SIGNIFICANT CHANGE UP (ref 0.65–1.3)
MAGNESIUM SERPL-MCNC: 1.7 MG/DL — LOW (ref 1.8–2.4)
MCHC RBC-ENTMCNC: 30.2 PG — SIGNIFICANT CHANGE UP (ref 27–31)
MCHC RBC-ENTMCNC: 32.3 G/DL — SIGNIFICANT CHANGE UP (ref 32–37)
MCV RBC AUTO: 93.4 FL — SIGNIFICANT CHANGE UP (ref 81–99)
NRBC # BLD: 0 /100 WBCS — SIGNIFICANT CHANGE UP (ref 0–0)
PLATELET # BLD AUTO: 196 K/UL — SIGNIFICANT CHANGE UP (ref 130–400)
POTASSIUM SERPL-MCNC: 4.5 MMOL/L — SIGNIFICANT CHANGE UP (ref 3.5–5)
POTASSIUM SERPL-SCNC: 4.5 MMOL/L — SIGNIFICANT CHANGE UP (ref 3.5–5)
PROTHROM AB SERPL-ACNC: 11.2 SEC — SIGNIFICANT CHANGE UP (ref 9.95–12.87)
RBC # BLD: 3.78 M/UL — LOW (ref 4.2–5.4)
RBC # FLD: 12.8 % — SIGNIFICANT CHANGE UP (ref 11.5–14.5)
SODIUM SERPL-SCNC: 141 MMOL/L — SIGNIFICANT CHANGE UP (ref 135–146)
WBC # BLD: 7.25 K/UL — SIGNIFICANT CHANGE UP (ref 4.8–10.8)
WBC # FLD AUTO: 7.25 K/UL — SIGNIFICANT CHANGE UP (ref 4.8–10.8)

## 2019-03-20 RX ORDER — MAGNESIUM SULFATE 500 MG/ML
2 VIAL (ML) INJECTION ONCE
Qty: 0 | Refills: 0 | Status: COMPLETED | OUTPATIENT
Start: 2019-03-20 | End: 2019-03-21

## 2019-03-20 RX ORDER — INDOMETHACIN 50 MG
100 CAPSULE ORAL ONCE
Qty: 0 | Refills: 0 | Status: COMPLETED | OUTPATIENT
Start: 2019-03-20 | End: 2019-03-20

## 2019-03-20 RX ORDER — HEPARIN SODIUM 5000 [USP'U]/ML
900 INJECTION INTRAVENOUS; SUBCUTANEOUS
Qty: 25000 | Refills: 0 | Status: DISCONTINUED | OUTPATIENT
Start: 2019-03-20 | End: 2019-03-20

## 2019-03-20 RX ORDER — INDOMETHACIN 50 MG
50 CAPSULE ORAL
Qty: 0 | Refills: 0 | Status: DISCONTINUED | OUTPATIENT
Start: 2019-03-20 | End: 2019-03-20

## 2019-03-20 RX ADMIN — PANTOPRAZOLE SODIUM 40 MILLIGRAM(S): 20 TABLET, DELAYED RELEASE ORAL at 06:24

## 2019-03-20 RX ADMIN — Medication 100 MILLIGRAM(S): at 17:10

## 2019-03-20 RX ADMIN — Medication 1 GRAM(S): at 06:24

## 2019-03-20 RX ADMIN — HEPARIN SODIUM 11 UNIT(S)/HR: 5000 INJECTION INTRAVENOUS; SUBCUTANEOUS at 01:00

## 2019-03-20 RX ADMIN — Medication 50 MILLIGRAM(S): at 06:24

## 2019-03-20 RX ADMIN — HEPARIN SODIUM 11 UNIT(S)/HR: 5000 INJECTION INTRAVENOUS; SUBCUTANEOUS at 01:25

## 2019-03-20 RX ADMIN — Medication 300 MILLIGRAM(S): at 06:24

## 2019-03-20 RX ADMIN — Medication 1 GRAM(S): at 02:01

## 2019-03-20 RX ADMIN — Medication 100 MILLIGRAM(S): at 06:24

## 2019-03-20 RX ADMIN — Medication 50 MILLIGRAM(S): at 19:06

## 2019-03-20 NOTE — CONSULT NOTE ADULT - SUBJECTIVE AND OBJECTIVE BOX
Patient is a 89y old  Female who presents with a chief complaint of Atrial fibrillation (20 Mar 2019 09:17)      HPI:  89 y/o female with pmh of afib on xarelto, HTN, DM, HLD, cholecystectomy is being admitted for Afib with RVR.    Pt presented for an o/p ERCP after her recent dx of Choledocholithiasis in 1/19. In the Endoscopy suite, pt was found to have a HR in 140s, BP stable, but her ERCP was cancelled by Anesthesia. Pt is being admitted for cardiology clearance, stabilization and possible ERCP AM.    Pt denied any acute complaints including CP, SOB. She did not take her meds this morning or last night since she was told not to take anything PO. (19 Mar 2019 09:46)      PAST MEDICAL & SURGICAL HISTORY:  Atrial fibrillation  High blood cholesterol  Hypertension  Diabetes mellitus, type 2  History of hip surgery                      PREVIOUS DIAGNOSTIC TESTING:      ECHO  FINDINGS:    STRESS  FINDINGS:    CATHETERIZATION  FINDINGS:    MEDICATIONS  (STANDING):  dextrose 5%. 1000 milliLiter(s) (50 mL/Hr) IV Continuous <Continuous>  dextrose 50% Injectable 12.5 Gram(s) IV Push once  dextrose 50% Injectable 25 Gram(s) IV Push once  dextrose 50% Injectable 25 Gram(s) IV Push once  diltiazem    milliGRAM(s) Oral daily  docusate sodium 100 milliGRAM(s) Oral three times a day  heparin  Infusion 900 Unit(s)/Hr (9 mL/Hr) IV Continuous <Continuous>  lactated ringers. 1000 milliLiter(s) (100 mL/Hr) IV Continuous <Continuous>  metoprolol tartrate 50 milliGRAM(s) Oral two times a day  pantoprazole    Tablet 40 milliGRAM(s) Oral before breakfast  simvastatin 40 milliGRAM(s) Oral at bedtime  sucralfate 1 Gram(s) Oral four times a day    MEDICATIONS  (PRN):  dextrose 40% Gel 15 Gram(s) Oral once PRN Blood Glucose LESS THAN 70 milliGRAM(s)/deciliter  glucagon  Injectable 1 milliGRAM(s) IntraMuscular once PRN Glucose LESS THAN 70 milligrams/deciliter      FAMILY HISTORY:  No pertinent family history in first degree relatives      SOCIAL HISTORY:    CIGARETTES:    ALCOHOL:        Vital Signs Last 24 Hrs  T(C): 36.4 (20 Mar 2019 05:28), Max: 36.4 (20 Mar 2019 05:28)  T(F): 97.5 (20 Mar 2019 05:28), Max: 97.5 (20 Mar 2019 05:28)  HR: 68 (20 Mar 2019 05:28) (66 - 135)  BP: 138/73 (20 Mar 2019 05:28) (112/78 - 138/73)  BP(mean): --  RR: 18 (20 Mar 2019 05:28) (17 - 19)  SpO2: --            INTERPRETATION OF TELEMETRY:    ECG:    I&O's Detail    19 Mar 2019 07:01  -  20 Mar 2019 07:00  --------------------------------------------------------  IN:    heparin Infusion: 109 mL    Oral Fluid: 400 mL  Total IN: 509 mL    OUT:    Voided: 400 mL  Total OUT: 400 mL    Total NET: 109 mL          LABS:                        11.4   7.25  )-----------( 196      ( 20 Mar 2019 06:35 )             35.3     03-20    141  |  105  |  17  ----------------------------<  149<H>  4.5   |  24  |  0.7    Ca    9.2      20 Mar 2019 06:35  Mg     1.7     03-20          PTT - ( 20 Mar 2019 06:35 )  PTT:125.5 sec    I&O's Summary    19 Mar 2019 07:01  -  20 Mar 2019 07:00  --------------------------------------------------------  IN: 509 mL / OUT: 400 mL / NET: 109 mL      BNP  RADIOLOGY & ADDITIONAL STUDIES:

## 2019-03-20 NOTE — PROGRESS NOTE ADULT - ASSESSMENT
Atrial fibrillation with rapid ventricular response  Hx of cholelithiases, for ERCP to R/O choledocholithiases, procedure cancelled due to the afib with RVR  Hx of  HTN, ASHD, afib ( Xarelto)  Hx of Hyperlipidemia  Hx of DM II  Hx of GERD, cholelithiasis, cholecystectomy, diverticulosis  Hx of OA, DDD, DJD, osteoporosis    ERCP postponed for today  admit to tele and stabilize pt cardiac wise  resume home meds inc cardizem and BB, cont to hold Xarelto, on IV heparin  check electrolytes inc Mg 1.7, replete  cardio consult dr Gates

## 2019-03-20 NOTE — CHART NOTE - NSCHARTNOTEFT_GEN_A_CORE
PACU ANESTHESIA ADMISSION NOTE      Procedure:   Post op diagnosis:      ____  Intubated  TV:______       Rate: ______      FiO2: ______    _x___  Patent Airway    _x___  Full return of protective reflexes    _x___  Full recovery from anesthesia / back to baseline status    Vitals:  T(C): 36.4 (03-20-19 @ 15:00), Max: 36.4 (03-20-19 @ 05:28)  HR: 114 (03-20-19 @ 16:55) (61 - 114)  BP: 153/87 (03-20-19 @ 16:55) (129/80 - 153/87)  RR: 18 (03-20-19 @ 16:55) (18 - 19)  SpO2: 99% (03-20-19 @ 16:55) (99% - 99%)    Mental Status:  _x___ Awake   _____ Alert   _____ Drowsy   _____ Sedated    Nausea/Vomiting:  _x___  NO       ______Yes,   See Post - Op Orders         Pain Scale (0-10):  __0___    Treatment: _x___ None    ____ See Post - Op/PCA Orders    Post - Operative Fluids:   __x__ Oral   ____ See Post - Op Orders    Plan: Discharge:   _x___Home       _____Floor     _____Critical Care    _____  Other:_________________    Comments:  No anesthesia issues or complications noted.  Discharge when criteria met.

## 2019-03-20 NOTE — PROGRESS NOTE ADULT - ASSESSMENT
________________________________________________________________________________  DAILY PROGRESS NOTE:    ================== SUBJECTIVE ==================    LATRICIA JOSE ARMANDO  /   89y  /  Female  /  MRN#: [id number]  Patient is a 89y old Female who presents with a chief complaint of afib  with RVR in the endoscopy suite  pre ERCP ( cancelled) (19 Mar 2019 21:33)  Currently admitted to medicine with the primary diagnosis of     HOSPITAL DAY: 1d     OVERNIGHT EVENTS:     TELEMETRY EVENTS:     TODAY: Patient was seen this morning at bedside. Currently, the patient reports ....    Review of systems is otherwise negative.    =================== OBJECTIVE ===================    VITAL SIGNS: Last 24 Hours  T(C): 36.4 (20 Mar 2019 05:28), Max: 36.4 (19 Mar 2019 09:31)  T(F): 97.5 (20 Mar 2019 05:28), Max: 97.5 (20 Mar 2019 05:28)  HR: 68 (20 Mar 2019 05:28) (66 - 140)  BP: 138/73 (20 Mar 2019 05:28) (112/78 - 140/77)  BP(mean): --  RR: 18 (20 Mar 2019 05:28) (17 - 20)  SpO2: --    03-19-19 @ 07:01  -  03-20-19 @ 07:00  --------------------------------------------------------  IN: 509 mL / OUT: 400 mL / NET: 109 mL      PHYSICAL EXAM:  GENERAL: NAD, well-developed  HEAD:  Atraumatic, Normocephalic  EYES: EOMI, PERRLA, conjunctiva and sclera clear  NECK: Supple, No JVD  CHEST/LUNG: Clear to auscultation bilaterally; No wheeze  HEART: Regular rate and rhythm; No murmurs, rubs, or gallops  ABDOMEN: Soft, Nontender, Nondistended; Bowel sounds present  EXTREMITIES:  2+ Peripheral Pulses, No clubbing, cyanosis, or edema  PSYCH: AAOx3  NEUROLOGY: non-focal  General:  Appearance is consistent with chronologic age.  No abnormal facies.   General: AA&Ox3.  Fund of knowledge is intact and normal.  Language with normal repetition, comprehension and naming.  Nondysarthric.    Cranial nerves: intact VA, VFF.  EOMI w/o nystagmus, skew or reported double vision.  PERRL.  No ptosis/weakness of eyelid closure.  Facial sensation is normal with normal bite.  No facial asymmetry.  Hearing grossly intact b/l.  Palate elevates midline.  Tongue midline.  Motor examination:   Normal tone, bulk and range of motion. ++right hand tremor     Formal Muscle Strength Testing: (MRC grade R/L) 5/5 UE; 5/5 LE.  No observable drift. ++ rigidity on circular wrist maneuver and on circular elbow maneuver. - Tinel test    Reflexes:   2+ b/l biceps and brachioradialis.   Sensory examination:   Intact to light touch and pinprick, pain, temperature and proprioception and vibration in all extremities.  Cerebellum:   FTN intact with normal DIEGO in all limbs.  No dysmetria or dysdiadokinesia.    Gait: ambulates with walker, ++ asymmetric steps, decreased heel strike   SKIN: No rashes or lesions      ===================== LABS =====================                        11.4   7.25  )-----------( 196      ( 20 Mar 2019 06:35 )             35.3     03-20    141  |  105  |  17  ----------------------------<  149<H>  4.5   |  24  |  0.7    Ca    9.2      20 Mar 2019 06:35  Mg     1.7     03-20      PTT - ( 20 Mar 2019 06:35 )  PTT:125.5 sec            ================= MICROBIOLOGY ================    ================== IMAGING ==================    ================== OTHER SIGNIFICANT FINDINGS ==================    ================== ALLERGIES ===================  No Known Drug Allergies  Originally Entered as [RASH] reaction to [cashew nut] (Unknown)    ==================== MEDS =====================  dextrose 5%. 1000 milliLiter(s) IV Continuous <Continuous>  dextrose 50% Injectable 12.5 Gram(s) IV Push once  dextrose 50% Injectable 25 Gram(s) IV Push once  dextrose 50% Injectable 25 Gram(s) IV Push once  diltiazem    milliGRAM(s) Oral daily  docusate sodium 100 milliGRAM(s) Oral three times a day  heparin  Infusion 900 Unit(s)/Hr IV Continuous <Continuous>  lactated ringers. 1000 milliLiter(s) IV Continuous <Continuous>  metoprolol tartrate 50 milliGRAM(s) Oral two times a day  pantoprazole    Tablet 40 milliGRAM(s) Oral before breakfast  simvastatin 40 milliGRAM(s) Oral at bedtime  sucralfate 1 Gram(s) Oral four times a day    PRN MEDICATIONS  dextrose 40% Gel 15 Gram(s) Oral once PRN  glucagon  Injectable 1 milliGRAM(s) IntraMuscular once PRN      ================= ASSESS/PLAN ==================  Patient is a 89y old Female who presents with a chief complaint of afib  with RVR in the endoscopy suite  pre ERCP ( cancelled) (19 Mar 2019 21:33)  Currently admitted to medicine with the primary diagnosis of     PLAN  89 y/o female with pmh of afib on xarelto, HTN, DM, HLD, cholecystectomy is being admitted for Afib with RVR.    # Afib with RVR likely 2/2 lack of Rate Controlling agents   -CHADVASC-5  s/p 1L LR  - Started on Cardizem 300mg and Metoprolol 50mg bid, start meds PO  - On Heparin drip for now  - Afib on the monitor 89-138s w/o sxs  - Cardio consult for clearance prior to procedure as requested by anesthesia     # Possible Choledocholithiasis  - pt is being followed by Dr Mooney  - c/w Carafate  - Possible ERCP AM  - NPO past midnight   - Please ensure pt receives her meds     # HTN  - c/w metoprolol 50mg daily and cardizem 300    # DM  - will hold home oral mediations  - start insulin lantus & add lispro as needed if FS >180    # DLD  - c/w atorvastatin daily    GI PPX: Pantoprazole   DVT PPX: Heparin Gtt    DIET: DASH diet   ACTIVITY:  () Ad Beatrice  /  (X) Advance as Tolerated  /  () Bed Rest  /  () Fall Precaution  /  () Seizure precaution    ================= PRESENT TODAY ==================    1-Desir Catheter: No  Indication:  2-Vascular Access:  [X]Peripheral | Indication:  []Midline | Indication:   []PICC Line | Indication:  []CVC | Indication:  []Arterial Line | Indication:  []Uldall Catheter | Indication:   3-IV Fluids: | Indication:  4-Ventilation: No | Sat:     ================= DISPOSITION ==================    Patient to be discharged when condition(s) optimized.            Discharge to:  (X) Home  /  () SNF  /  () [facility] /  () Hospice / () Other             Home services:  () Home health  /  () Rehab  /  () IV Abx  /  () Education  /  () None            Counseled on/Discussed cessation of:  () Risky behaviors  /  () Smoking cessation  /  () Diet  /  () Exercise  /  () Other    ================= CODE STATUS =================                  (X) FULL CODE     |     () DNR     |     () DNI    () Discussion with patient and/or family regarding goals of care ________________________________________________________________________________  DAILY PROGRESS NOTE:    ================== SUBJECTIVE ==================    LATRICIA JOSE ARMANDO  /   89y  /  Female  /  MRN#: [id number]  Patient is a 89y old Female who presents with a chief complaint of afib  with RVR in the endoscopy suite  pre ERCP ( cancelled) (19 Mar 2019 21:33)  Currently admitted to medicine with the primary diagnosis of     HOSPITAL DAY: 1d     OVERNIGHT EVENTS: None    TELEMETRY EVENTS: None    TODAY: Patient was seen this morning at bedside. Currently, the patient reports no complaints     Review of systems is otherwise negative.    =================== OBJECTIVE ===================    VITAL SIGNS: Last 24 Hours  T(C): 36.4 (20 Mar 2019 05:28), Max: 36.4 (19 Mar 2019 09:31)  T(F): 97.5 (20 Mar 2019 05:28), Max: 97.5 (20 Mar 2019 05:28)  HR: 68 (20 Mar 2019 05:28) (66 - 140)  BP: 138/73 (20 Mar 2019 05:28) (112/78 - 140/77)  BP(mean): --  RR: 18 (20 Mar 2019 05:28) (17 - 20)  SpO2: --    03-19-19 @ 07:01  -  03-20-19 @ 07:00  --------------------------------------------------------  IN: 509 mL / OUT: 400 mL / NET: 109 mL      PHYSICAL EXAM:  GENERAL: NAD, well-developed  CHEST/LUNG: Clear to auscultation bilaterally; No wheeze  HEART: Regular rate and rhythm; No murmurs, rubs, or gallops  ABDOMEN: Soft, Nontender, Nondistended; Bowel sounds present  EXTREMITIES:  2+ Peripheral Pulses, No clubbing, cyanosis, or edema  PSYCH: AAOx3  NEUROLOGY: non-focal  General:  Appearance is consistent with chronologic age.  No abnormal facies.   General: AA&Ox3.  Fund of knowledge is intact and normal.  Language with normal repetition, comprehension and naming.  Nondysarthric.    SKIN: No rashes or lesions      ===================== LABS =====================                        11.4   7.25  )-----------( 196      ( 20 Mar 2019 06:35 )             35.3     03-20    141  |  105  |  17  ----------------------------<  149<H>  4.5   |  24  |  0.7    Ca    9.2      20 Mar 2019 06:35  Mg     1.7     03-20      PTT - ( 20 Mar 2019 06:35 )  PTT:125.5 sec            ================= MICROBIOLOGY ================    ================== IMAGING ==================    ================== OTHER SIGNIFICANT FINDINGS ==================    ================== ALLERGIES ===================  No Known Drug Allergies  Originally Entered as [RASH] reaction to [cashew nut] (Unknown)    ==================== MEDS =====================  dextrose 5%. 1000 milliLiter(s) IV Continuous <Continuous>  dextrose 50% Injectable 12.5 Gram(s) IV Push once  dextrose 50% Injectable 25 Gram(s) IV Push once  dextrose 50% Injectable 25 Gram(s) IV Push once  diltiazem    milliGRAM(s) Oral daily  docusate sodium 100 milliGRAM(s) Oral three times a day  heparin  Infusion 900 Unit(s)/Hr IV Continuous <Continuous>  lactated ringers. 1000 milliLiter(s) IV Continuous <Continuous>  metoprolol tartrate 50 milliGRAM(s) Oral two times a day  pantoprazole    Tablet 40 milliGRAM(s) Oral before breakfast  simvastatin 40 milliGRAM(s) Oral at bedtime  sucralfate 1 Gram(s) Oral four times a day    PRN MEDICATIONS  dextrose 40% Gel 15 Gram(s) Oral once PRN  glucagon  Injectable 1 milliGRAM(s) IntraMuscular once PRN      ================= ASSESS/PLAN ==================  Patient is a 89y old Female who presents with a chief complaint of afib  with RVR in the endoscopy suite  pre ERCP ( cancelled) (19 Mar 2019 21:33)  Currently admitted to medicine with the primary diagnosis of     PLAN  87 y/o female with pmh of afib on xarelto, HTN, DM, HLD, cholecystectomy is being admitted for Afib with RVR.    # Afib with RVR likely 2/2 lack of Rate Controlling agents   -CHADVASC-5  s/p 1L LR  - Started on Cardizem 300mg and Metoprolol 50mg bid, start meds PO  - On Heparin drip for now  - Afib on the monitor 89-138s w/o sxs  - Cardio consult for clearance prior to procedure as requested by anesthesia     # Possible Choledocholithiasis  - pt is being followed by Dr Mooney  - c/w Carafate  - Possible ERCP AM  - NPO past midnight   - Please ensure pt receives her meds     # HTN  - c/w metoprolol 50mg daily and cardizem 300    # DM  - will hold home oral mediations  - start insulin lantus & add lispro as needed if FS >180    # DLD  - c/w atorvastatin daily    GI PPX: Pantoprazole   DVT PPX: Heparin Gtt    DIET: DASH diet   ACTIVITY:  () Ad Beatrice  /  (X) Advance as Tolerated  /  () Bed Rest  /  () Fall Precaution  /  () Seizure precaution    ================= PRESENT TODAY ==================    1-Desir Catheter: No  Indication:  2-Vascular Access:  [X]Peripheral | Indication:  []Midline | Indication:   []PICC Line | Indication:  []CVC | Indication:  []Arterial Line | Indication:  []Uldall Catheter | Indication:   3-IV Fluids: | Indication:  4-Ventilation: No | Sat:     ================= DISPOSITION ==================    Patient to be discharged when condition(s) optimized.            Discharge to:  (X) Home  /  () SNF  /  () [facility] /  () Hospice / () Other             Home services:  () Home health  /  () Rehab  /  () IV Abx  /  () Education  /  () None            Counseled on/Discussed cessation of:  () Risky behaviors  /  () Smoking cessation  /  () Diet  /  () Exercise  /  () Other    ================= CODE STATUS =================                  (X) FULL CODE     |     () DNR     |     () DNI    () Discussion with patient and/or family regarding goals of care ________________________________________________________________________________  DAILY PROGRESS NOTE:    ================== SUBJECTIVE ==================    LATRICIA JOSE ARMANDO  /   89y  /  Female  /  MRN#: [id number]  Patient is a 89y old Female who presents with a chief complaint of afib  with RVR in the endoscopy suite  pre ERCP ( cancelled) (19 Mar 2019 21:33)  Currently admitted to medicine with the primary diagnosis of     HOSPITAL DAY: 1d     OVERNIGHT EVENTS: None    TELEMETRY EVENTS: None    TODAY: Patient was seen this morning at bedside. Currently, the patient reports no complaints     Review of systems is otherwise negative.    =================== OBJECTIVE ===================    VITAL SIGNS: Last 24 Hours  T(C): 36.4 (20 Mar 2019 05:28), Max: 36.4 (19 Mar 2019 09:31)  T(F): 97.5 (20 Mar 2019 05:28), Max: 97.5 (20 Mar 2019 05:28)  HR: 68 (20 Mar 2019 05:28) (66 - 140)  BP: 138/73 (20 Mar 2019 05:28) (112/78 - 140/77)  BP(mean): --  RR: 18 (20 Mar 2019 05:28) (17 - 20)  SpO2: --    03-19-19 @ 07:01  -  03-20-19 @ 07:00  --------------------------------------------------------  IN: 509 mL / OUT: 400 mL / NET: 109 mL      PHYSICAL EXAM:  GENERAL: NAD, well-developed  CHEST/LUNG: Clear to auscultation bilaterally; No wheeze  HEART: Regular rate and rhythm; No murmurs, rubs, or gallops  ABDOMEN: Soft, Nontender, Nondistended; Bowel sounds present  EXTREMITIES:  2+ Peripheral Pulses, No clubbing, cyanosis, or edema  PSYCH: AAOx3  NEUROLOGY: non-focal  General:  Appearance is consistent with chronologic age.  No abnormal facies.   General: AA&Ox3.  Fund of knowledge is intact and normal.  Language with normal repetition, comprehension and naming.  Nondysarthric.    SKIN: No rashes or lesions      ===================== LABS =====================                        11.4   7.25  )-----------( 196      ( 20 Mar 2019 06:35 )             35.3     03-20    141  |  105  |  17  ----------------------------<  149<H>  4.5   |  24  |  0.7    Ca    9.2      20 Mar 2019 06:35  Mg     1.7     03-20      PTT - ( 20 Mar 2019 06:35 )  PTT:125.5 sec    ================== ALLERGIES ===================  No Known Drug Allergies  Originally Entered as [RASH] reaction to [cashew nut] (Unknown)    ==================== MEDS =====================  dextrose 5%. 1000 milliLiter(s) IV Continuous <Continuous>  dextrose 50% Injectable 12.5 Gram(s) IV Push once  dextrose 50% Injectable 25 Gram(s) IV Push once  dextrose 50% Injectable 25 Gram(s) IV Push once  diltiazem    milliGRAM(s) Oral daily  docusate sodium 100 milliGRAM(s) Oral three times a day  heparin  Infusion 900 Unit(s)/Hr IV Continuous <Continuous>  lactated ringers. 1000 milliLiter(s) IV Continuous <Continuous>  metoprolol tartrate 50 milliGRAM(s) Oral two times a day  pantoprazole    Tablet 40 milliGRAM(s) Oral before breakfast  simvastatin 40 milliGRAM(s) Oral at bedtime  sucralfate 1 Gram(s) Oral four times a day    PRN MEDICATIONS  dextrose 40% Gel 15 Gram(s) Oral once PRN  glucagon  Injectable 1 milliGRAM(s) IntraMuscular once PRN      ================= ASSESS/PLAN ==================  Patient is a 89y old Female who presents with a chief complaint of afib  with RVR in the endoscopy suite  pre ERCP ( cancelled) (19 Mar 2019 21:33)  Currently admitted to medicine with the primary diagnosis of     PLAN  87 y/o female with pmh of afib on xarelto, HTN, DM, HLD, cholecystectomy is being admitted for Afib with RVR.    # Afib with RVR likely 2/2 lack of Rate Controlling agents   -CHADVASC-5  s/p 1L LR  - Started on Cardizem 300mg and Metoprolol 50mg bid, start meds PO  - On Heparin drip for now  - Afib but rate controlled on the monitor   - Cardio consult for clearance prior to procedure as requested by anesthesia     # Possible Choledocholithiasis  - pt is being followed by Dr Mooney  - c/w Carafate  - Possible ERCP this afternoon. Will D/C heparin drip at 1100 am   - NPO past midnight   - Please ensure pt receives her meds     # HTN  - c/w metoprolol 50mg daily and cardizem 300    # DM  - will hold home oral mediations  - start insulin lantus & add lispro as needed if FS >180    # DLD  - c/w atorvastatin daily    GI PPX: Pantoprazole   DVT PPX: Heparin Gtt    DIET: DASH diet   ACTIVITY:  () Ad Beatrice  /  (X) Advance as Tolerated  /  () Bed Rest  /  () Fall Precaution  /  () Seizure precaution    ================= PRESENT TODAY ==================    1-Desir Catheter: No  Indication:  2-Vascular Access:  [X]Peripheral | Indication:  []Midline | Indication:   []PICC Line | Indication:  []CVC | Indication:  []Arterial Line | Indication:  []Uldall Catheter | Indication:   3-IV Fluids: | Indication:  4-Ventilation: No | Sat:     ================= DISPOSITION ==================    Patient to be discharged when condition(s) optimized.            Discharge to:  (X) Home  /  () SNF  /  () [facility] /  () Hospice / () Other             Home services:  () Home health  /  () Rehab  /  () IV Abx  /  () Education  /  () None            Counseled on/Discussed cessation of:  () Risky behaviors  /  () Smoking cessation  /  () Diet  /  () Exercise  /  () Other    ================= CODE STATUS =================                  (X) FULL CODE     |     () DNR     |     () DNI    () Discussion with patient and/or family regarding goals of care ________________________________________________________________________________  DAILY PROGRESS NOTE:    ================== SUBJECTIVE ==================    LATRICIA JOSE ARMANDO  /   89y  /  Female  /  MRN#: [id number]  Patient is a 89y old Female who presents with a chief complaint of afib  with RVR in the endoscopy suite  pre ERCP ( cancelled) (19 Mar 2019 21:33)  Currently admitted to medicine with the primary diagnosis of     HOSPITAL DAY: 1d     OVERNIGHT EVENTS: None    TELEMETRY EVENTS: None    TODAY: Patient was seen this morning at bedside. Currently, the patient reports no complaints     Review of systems is otherwise negative.    =================== OBJECTIVE ===================    VITAL SIGNS: Last 24 Hours  T(C): 36.4 (20 Mar 2019 05:28), Max: 36.4 (19 Mar 2019 09:31)  T(F): 97.5 (20 Mar 2019 05:28), Max: 97.5 (20 Mar 2019 05:28)  HR: 68 (20 Mar 2019 05:28) (66 - 140)  BP: 138/73 (20 Mar 2019 05:28) (112/78 - 140/77)  BP(mean): --  RR: 18 (20 Mar 2019 05:28) (17 - 20)  SpO2: --    03-19-19 @ 07:01  -  03-20-19 @ 07:00  --------------------------------------------------------  IN: 509 mL / OUT: 400 mL / NET: 109 mL      PHYSICAL EXAM:  GENERAL: NAD, well-developed  CHEST/LUNG: Clear to auscultation bilaterally; No wheeze  HEART: Regular rate and rhythm; No murmurs, rubs, or gallops  ABDOMEN: Soft, Nontender, Nondistended; Bowel sounds present  EXTREMITIES:  2+ Peripheral Pulses, No clubbing, cyanosis, or edema  PSYCH: AAOx3  NEUROLOGY: non-focal  General:  Appearance is consistent with chronologic age.  No abnormal facies.   General: AA&Ox3.  Fund of knowledge is intact and normal.  Language with normal repetition, comprehension and naming.  Nondysarthric.    SKIN: No rashes or lesions      ===================== LABS =====================                        11.4   7.25  )-----------( 196      ( 20 Mar 2019 06:35 )             35.3     03-20    141  |  105  |  17  ----------------------------<  149<H>  4.5   |  24  |  0.7    Ca    9.2      20 Mar 2019 06:35  Mg     1.7     03-20      PTT - ( 20 Mar 2019 06:35 )  PTT:125.5 sec    ================== ALLERGIES ===================  No Known Drug Allergies  Originally Entered as [RASH] reaction to [cashew nut] (Unknown)    ==================== MEDS =====================  dextrose 5%. 1000 milliLiter(s) IV Continuous <Continuous>  dextrose 50% Injectable 12.5 Gram(s) IV Push once  dextrose 50% Injectable 25 Gram(s) IV Push once  dextrose 50% Injectable 25 Gram(s) IV Push once  diltiazem    milliGRAM(s) Oral daily  docusate sodium 100 milliGRAM(s) Oral three times a day  heparin  Infusion 900 Unit(s)/Hr IV Continuous <Continuous>  lactated ringers. 1000 milliLiter(s) IV Continuous <Continuous>  metoprolol tartrate 50 milliGRAM(s) Oral two times a day  pantoprazole    Tablet 40 milliGRAM(s) Oral before breakfast  simvastatin 40 milliGRAM(s) Oral at bedtime  sucralfate 1 Gram(s) Oral four times a day    PRN MEDICATIONS  dextrose 40% Gel 15 Gram(s) Oral once PRN  glucagon  Injectable 1 milliGRAM(s) IntraMuscular once PRN      ================= ASSESS/PLAN ==================  Patient is a 89y old Female who presents with a chief complaint of afib  with RVR in the endoscopy suite  pre ERCP ( cancelled) (19 Mar 2019 21:33)  Currently admitted to medicine with the primary diagnosis of     PLAN  89 y/o female with pmh of afib on xarelto, HTN, DM, HLD, cholecystectomy is being admitted for Afib with RVR.    # Afib with RVR likely 2/2 lack of Rate Controlling agents   -CHADVASC-5  s/p 1L LR  - Started on Cardizem 300mg and Metoprolol 50mg bid, start meds PO  - On Heparin drip for now  - Afib but rate controlled on the monitor   - Cardio consult for clearance prior to procedure as requested by anesthesia     # Possible Choledocholithiasis  - pt is being followed by Dr Mooney  - c/w Carafate  - Possible ERCP this afternoon. Will D/C heparin drip at 1100 am   - NPO past midnight   - Please ensure pt receives her meds     ERCP performed with sphincterotomy, a stone was removed using stone extraction  balloon resulting in excellent biliary drainage.     Plan: Can start on clears if no abdominal pain 1 hr after the procedure.  Hold anticoagulation tonight, can resume Xarelto tomorrow, if no bleeding.  Monitor liver enzymes.  Call GI for any fever, abdominal pain, rectal bleeding or hypotension post  procedure.    # HTN  - c/w metoprolol 50mg daily and cardizem 300    # DM  - will hold home oral mediations  - start insulin lantus & add lispro as needed if FS >180    # DLD  - c/w atorvastatin daily    GI PPX: Pantoprazole   DVT PPX: Heparin Gtt    DIET: DASH diet   ACTIVITY:  () Ad Beatrice  /  (X) Advance as Tolerated  /  () Bed Rest  /  () Fall Precaution  /  () Seizure precaution    ================= PRESENT TODAY ==================    1-Desir Catheter: No  Indication:  2-Vascular Access:  [X]Peripheral | Indication:  []Midline | Indication:   []PICC Line | Indication:  []CVC | Indication:  []Arterial Line | Indication:  []Uldall Catheter | Indication:   3-IV Fluids: | Indication:  4-Ventilation: No | Sat:     ================= DISPOSITION ==================    Patient to be discharged when condition(s) optimized.            Discharge to:  (X) Home  /  () SNF  /  () [facility] /  () Hospice / () Other             Home services:  () Home health  /  () Rehab  /  () IV Abx  /  () Education  /  () None            Counseled on/Discussed cessation of:  () Risky behaviors  /  () Smoking cessation  /  () Diet  /  () Exercise  /  () Other    ================= CODE STATUS =================                  (X) FULL CODE     |     () DNR     |     () DNI    () Discussion with patient and/or family regarding goals of care

## 2019-03-21 LAB
ALBUMIN SERPL ELPH-MCNC: 3.6 G/DL — SIGNIFICANT CHANGE UP (ref 3.5–5.2)
ALP SERPL-CCNC: 109 U/L — SIGNIFICANT CHANGE UP (ref 30–115)
ALT FLD-CCNC: 44 U/L — HIGH (ref 0–41)
ANION GAP SERPL CALC-SCNC: 13 MMOL/L — SIGNIFICANT CHANGE UP (ref 7–14)
AST SERPL-CCNC: 27 U/L — SIGNIFICANT CHANGE UP (ref 0–41)
BASOPHILS # BLD AUTO: 0 K/UL — SIGNIFICANT CHANGE UP (ref 0–0.2)
BASOPHILS NFR BLD AUTO: 0 % — SIGNIFICANT CHANGE UP (ref 0–1)
BILIRUB SERPL-MCNC: 0.9 MG/DL — SIGNIFICANT CHANGE UP (ref 0.2–1.2)
BUN SERPL-MCNC: 19 MG/DL — SIGNIFICANT CHANGE UP (ref 10–20)
CALCIUM SERPL-MCNC: 9.3 MG/DL — SIGNIFICANT CHANGE UP (ref 8.5–10.1)
CHLORIDE SERPL-SCNC: 98 MMOL/L — SIGNIFICANT CHANGE UP (ref 98–110)
CK MB CFR SERPL CALC: 2.4 NG/ML — SIGNIFICANT CHANGE UP (ref 0.6–6.3)
CK MB CFR SERPL CALC: 2.4 NG/ML — SIGNIFICANT CHANGE UP (ref 0.6–6.3)
CK SERPL-CCNC: 26 U/L — SIGNIFICANT CHANGE UP (ref 0–225)
CO2 SERPL-SCNC: 25 MMOL/L — SIGNIFICANT CHANGE UP (ref 17–32)
CREAT SERPL-MCNC: 0.8 MG/DL — SIGNIFICANT CHANGE UP (ref 0.7–1.5)
EOSINOPHIL # BLD AUTO: 0 K/UL — SIGNIFICANT CHANGE UP (ref 0–0.7)
EOSINOPHIL NFR BLD AUTO: 0 % — SIGNIFICANT CHANGE UP (ref 0–8)
GLUCOSE BLDC GLUCOMTR-MCNC: 199 MG/DL — HIGH (ref 70–99)
GLUCOSE BLDC GLUCOMTR-MCNC: 216 MG/DL — HIGH (ref 70–99)
GLUCOSE BLDC GLUCOMTR-MCNC: 220 MG/DL — HIGH (ref 70–99)
GLUCOSE BLDC GLUCOMTR-MCNC: 247 MG/DL — HIGH (ref 70–99)
GLUCOSE SERPL-MCNC: 258 MG/DL — HIGH (ref 70–99)
HCT VFR BLD CALC: 38.4 % — SIGNIFICANT CHANGE UP (ref 37–47)
HGB BLD-MCNC: 12.4 G/DL — SIGNIFICANT CHANGE UP (ref 12–16)
IMM GRANULOCYTES NFR BLD AUTO: 0.4 % — HIGH (ref 0.1–0.3)
LYMPHOCYTES # BLD AUTO: 0.4 K/UL — LOW (ref 1.2–3.4)
LYMPHOCYTES # BLD AUTO: 3.5 % — LOW (ref 20.5–51.1)
MAGNESIUM SERPL-MCNC: 1.5 MG/DL — LOW (ref 1.8–2.4)
MCHC RBC-ENTMCNC: 30.2 PG — SIGNIFICANT CHANGE UP (ref 27–31)
MCHC RBC-ENTMCNC: 32.3 G/DL — SIGNIFICANT CHANGE UP (ref 32–37)
MCV RBC AUTO: 93.7 FL — SIGNIFICANT CHANGE UP (ref 81–99)
MONOCYTES # BLD AUTO: 0.81 K/UL — HIGH (ref 0.1–0.6)
MONOCYTES NFR BLD AUTO: 7.2 % — SIGNIFICANT CHANGE UP (ref 1.7–9.3)
NEUTROPHILS # BLD AUTO: 10.06 K/UL — HIGH (ref 1.4–6.5)
NEUTROPHILS NFR BLD AUTO: 88.9 % — HIGH (ref 42.2–75.2)
NRBC # BLD: 0 /100 WBCS — SIGNIFICANT CHANGE UP (ref 0–0)
PLATELET # BLD AUTO: 205 K/UL — SIGNIFICANT CHANGE UP (ref 130–400)
POTASSIUM SERPL-MCNC: 5 MMOL/L — SIGNIFICANT CHANGE UP (ref 3.5–5)
POTASSIUM SERPL-SCNC: 5 MMOL/L — SIGNIFICANT CHANGE UP (ref 3.5–5)
PROT SERPL-MCNC: 5.5 G/DL — LOW (ref 6–8)
RBC # BLD: 4.1 M/UL — LOW (ref 4.2–5.4)
RBC # FLD: 12.5 % — SIGNIFICANT CHANGE UP (ref 11.5–14.5)
SODIUM SERPL-SCNC: 136 MMOL/L — SIGNIFICANT CHANGE UP (ref 135–146)
TROPONIN T SERPL-MCNC: <0.01 NG/ML — SIGNIFICANT CHANGE UP
WBC # BLD: 11.32 K/UL — HIGH (ref 4.8–10.8)
WBC # FLD AUTO: 11.32 K/UL — HIGH (ref 4.8–10.8)

## 2019-03-21 RX ORDER — RIVAROXABAN 15 MG-20MG
20 KIT ORAL EVERY 24 HOURS
Qty: 0 | Refills: 0 | Status: DISCONTINUED | OUTPATIENT
Start: 2019-03-21 | End: 2019-03-23

## 2019-03-21 RX ORDER — ADENOSINE 3 MG/ML
60 INJECTION INTRAVENOUS ONCE
Qty: 0 | Refills: 0 | Status: COMPLETED | OUTPATIENT
Start: 2019-03-21 | End: 2019-03-22

## 2019-03-21 RX ORDER — MAGNESIUM SULFATE 500 MG/ML
2 VIAL (ML) INJECTION ONCE
Qty: 0 | Refills: 0 | Status: COMPLETED | OUTPATIENT
Start: 2019-03-21 | End: 2019-03-21

## 2019-03-21 RX ADMIN — Medication 50 GRAM(S): at 06:31

## 2019-03-21 RX ADMIN — Medication 100 MILLIGRAM(S): at 06:31

## 2019-03-21 RX ADMIN — Medication 100 MILLIGRAM(S): at 13:16

## 2019-03-21 RX ADMIN — Medication 1 GRAM(S): at 13:16

## 2019-03-21 RX ADMIN — Medication 100 MILLIGRAM(S): at 22:07

## 2019-03-21 RX ADMIN — SIMVASTATIN 40 MILLIGRAM(S): 20 TABLET, FILM COATED ORAL at 22:07

## 2019-03-21 RX ADMIN — Medication 50 MILLIGRAM(S): at 06:32

## 2019-03-21 RX ADMIN — Medication 1 GRAM(S): at 06:32

## 2019-03-21 RX ADMIN — Medication 25 GRAM(S): at 14:57

## 2019-03-21 RX ADMIN — RIVAROXABAN 20 MILLIGRAM(S): KIT at 22:07

## 2019-03-21 RX ADMIN — Medication 50 GRAM(S): at 17:42

## 2019-03-21 RX ADMIN — Medication 50 MILLIGRAM(S): at 18:54

## 2019-03-21 RX ADMIN — Medication 300 MILLIGRAM(S): at 06:31

## 2019-03-21 RX ADMIN — PANTOPRAZOLE SODIUM 40 MILLIGRAM(S): 20 TABLET, DELAYED RELEASE ORAL at 06:32

## 2019-03-21 NOTE — PROGRESS NOTE ADULT - ASSESSMENT
________________________________________________________________________________  DAILY PROGRESS NOTE:    ================== SUBJECTIVE ==================    LATRICIA JOSE ARMANDO  /   89y  /  Female  /  MRN#: [id number]  Patient is a 89y old Female who presents with a chief complaint of cardiac arrhythmia (20 Mar 2019 23:07)  Currently admitted to medicine with the primary diagnosis of     HOSPITAL DAY: 2d     OVERNIGHT EVENTS: None    TELEMETRY EVENTS: A fib with RVR    TODAY: Patient was seen this morning at bedside. Currently, the patient reports no complaints     Review of systems is otherwise negative.    =================== OBJECTIVE ===================    VITAL SIGNS: Last 24 Hours  T(C): 37 (21 Mar 2019 05:14), Max: 37 (21 Mar 2019 05:14)  T(F): 98.6 (21 Mar 2019 05:14), Max: 98.6 (21 Mar 2019 05:14)  HR: 105 (21 Mar 2019 05:14) (59 - 119)  BP: 124/66 (21 Mar 2019 05:14) (118/85 - 179/102)  BP(mean): --  RR: 18 (21 Mar 2019 05:14) (18 - 19)  SpO2: 95% (20 Mar 2019 21:13) (95% - 99%)    03-20-19 @ 07:01  -  03-21-19 @ 07:00  --------------------------------------------------------  IN: 36 mL / OUT: 300 mL / NET: -264 mL    PHYSICAL EXAM:  GENERAL: NAD, well-developed  CHEST/LUNG: Clear to auscultation bilaterally; No wheeze  HEART: Regular rate and rhythm; No murmurs, rubs, or gallops  ABDOMEN: Soft, Nontender, Nondistended; Bowel sounds present  EXTREMITIES:  2+ Peripheral Pulses, No clubbing, cyanosis, or edema  PSYCH: AAOx3  NEUROLOGY: non-focal  General:  Appearance is consistent with chronologic age.  No abnormal facies.   General: AA&Ox3.  Fund of knowledge is intact and normal.  Language with normal repetition, comprehension and naming.  Nondysarthric.    SKIN: No rashes or lesions    ===================== LABS =====================                        12.4   11.32 )-----------( 205      ( 21 Mar 2019 06:51 )             38.4     03-21    136  |  98  |  19  ----------------------------<  258<H>  5.0   |  25  |  0.8    Ca    9.3      21 Mar 2019 06:51  Mg     1.5     03-21    TPro  5.5<L>  /  Alb  3.6  /  TBili  0.9  /  DBili  x   /  AST  27  /  ALT  44<H>  /  AlkPhos  109  03-21    PT/INR - ( 20 Mar 2019 11:38 )   PT: 11.20 sec;   INR: 0.97 ratio         PTT - ( 20 Mar 2019 11:38 )  PTT:54.3 sec    ================== OTHER SIGNIFICANT FINDINGS ==================    < from: ERCP (03.20.19 @ 16:30) >  Summary:   ERCP performed with sphincterotomy, a stone was removed using stone extraction  balloon resulting in excellent biliary drainage.     < end of copied text >    ================== ALLERGIES ===================  No Known Drug Allergies  Originally Entered as [RASH] reaction to [cashew nut] (Unknown)    ==================== MEDS =====================  adenosine Injectable (ADENOSCAN) 60 milliGRAM(s) IV Bolus once  dextrose 5%. 1000 milliLiter(s) IV Continuous <Continuous>  dextrose 50% Injectable 12.5 Gram(s) IV Push once  dextrose 50% Injectable 25 Gram(s) IV Push once  dextrose 50% Injectable 25 Gram(s) IV Push once  diltiazem    milliGRAM(s) Oral daily  docusate sodium 100 milliGRAM(s) Oral three times a day  lactated ringers. 1000 milliLiter(s) IV Continuous <Continuous>  magnesium sulfate  IVPB 2 Gram(s) IV Intermittent once  magnesium sulfate  IVPB 2 Gram(s) IV Intermittent once  metoprolol tartrate 50 milliGRAM(s) Oral two times a day  pantoprazole    Tablet 40 milliGRAM(s) Oral before breakfast  simvastatin 40 milliGRAM(s) Oral at bedtime  sucralfate 1 Gram(s) Oral four times a day    PRN MEDICATIONS  dextrose 40% Gel 15 Gram(s) Oral once PRN  glucagon  Injectable 1 milliGRAM(s) IntraMuscular once PRN    ================= ASSESS/PLAN ==================  Patient is a 89y old Female who presents with a chief complaint of afib  with RVR in the endoscopy suite  pre ERCP ( cancelled) (19 Mar 2019 21:33)  Currently admitted to medicine with the primary diagnosis of     PLAN  87 y/o female with pmh of afib on xarelto, HTN, DM, HLD, cholecystectomy is being admitted for Afib with RVR.    # Afib with RVR likely 2/2 lack of Rate Controlling agents   -CHADVASC-5  s/p 1L LR  - Started on Cardizem 300mg and Metoprolol 50mg bid  - Afib but rate controlled on the monitor for past 48 hours, this morning rate around 100. No reason as to why patient would increase rate. Will monitor for now   - Per cardiology: needs a cardiac workup inpatient. Will send for NM stress test and echo   - Will restart the xarelto today     # Possible Choledocholithiasis  - ERCP performed with sphincterotomy, a stone was removed using stone extraction  balloon resulting in excellent biliary drainage.  - Liver enzymes resolved   - Will restart diet     # HTN  - c/w metoprolol 50mg daily and cardizem 300    # DM  - will hold home oral mediations  - start insulin lantus & add lispro as needed if FS >180    # DLD  - c/w atorvastatin daily    GI PPX: Pantoprazole   DVT PPX: Heparin Gtt    DIET: DASH diet   ACTIVITY:  () Ad Beatrice  /  (X) Advance as Tolerated  /  () Bed Rest  /  () Fall Precaution  /  () Seizure precaution    ================= PRESENT TODAY ==================    1-Desir Catheter: No  Indication:  2-Vascular Access:  [X]Peripheral | Indication:  []Midline | Indication:   []PICC Line | Indication:  []CVC | Indication:  []Arterial Line | Indication:  []Uldall Catheter | Indication:   3-IV Fluids: | Indication:  4-Ventilation: No | Sat:     ================= DISPOSITION ==================    Patient to be discharged when condition(s) optimized.            Discharge to:  (X) Home  /  () SNF  /  () HHA /  () Hospice / () Other             Home services:  () Home health  /  () Rehab  /  () IV Abx  /  () Education  /  () None            Counseled on/Discussed cessation of:  () Risky behaviors  /  () Smoking cessation  /  () Diet  /  () Exercise  /  () Other    ================= CODE STATUS =================                  (X) FULL CODE     |     () DNR     |     () DNI ________________________________________________________________________________  DAILY PROGRESS NOTE:    ================== SUBJECTIVE ==================    LATRICIA JOSE ARMANDO  /   89y  /  Female  /  MRN#: [id number]  Patient is a 89y old Female who presents with a chief complaint of cardiac arrhythmia (20 Mar 2019 23:07)  Currently admitted to medicine with the primary diagnosis of     HOSPITAL DAY: 2d     OVERNIGHT EVENTS: None    TELEMETRY EVENTS: A fib with RVR    TODAY: Patient was seen this morning at bedside. Currently, the patient reports no complaints     Review of systems is otherwise negative.    =================== OBJECTIVE ===================    VITAL SIGNS: Last 24 Hours  T(C): 37 (21 Mar 2019 05:14), Max: 37 (21 Mar 2019 05:14)  T(F): 98.6 (21 Mar 2019 05:14), Max: 98.6 (21 Mar 2019 05:14)  HR: 105 (21 Mar 2019 05:14) (59 - 119)  BP: 124/66 (21 Mar 2019 05:14) (118/85 - 179/102)  BP(mean): --  RR: 18 (21 Mar 2019 05:14) (18 - 19)  SpO2: 95% (20 Mar 2019 21:13) (95% - 99%)    03-20-19 @ 07:01  -  03-21-19 @ 07:00  --------------------------------------------------------  IN: 36 mL / OUT: 300 mL / NET: -264 mL    PHYSICAL EXAM:  GENERAL: NAD, well-developed  CHEST/LUNG: Clear to auscultation bilaterally; No wheeze  HEART: Regular rate and rhythm; No murmurs, rubs, or gallops  ABDOMEN: Soft, Nontender, Nondistended; Bowel sounds present  EXTREMITIES:  2+ Peripheral Pulses, No clubbing, cyanosis, or edema  PSYCH: AAOx3  NEUROLOGY: non-focal  General:  Appearance is consistent with chronologic age.  No abnormal facies.   General: AA&Ox3.  Fund of knowledge is intact and normal.  Language with normal repetition, comprehension and naming.  Nondysarthric.    SKIN: No rashes or lesions    ===================== LABS =====================                        12.4   11.32 )-----------( 205      ( 21 Mar 2019 06:51 )             38.4     03-21    136  |  98  |  19  ----------------------------<  258<H>  5.0   |  25  |  0.8    Ca    9.3      21 Mar 2019 06:51  Mg     1.5     03-21    TPro  5.5<L>  /  Alb  3.6  /  TBili  0.9  /  DBili  x   /  AST  27  /  ALT  44<H>  /  AlkPhos  109  03-21    PT/INR - ( 20 Mar 2019 11:38 )   PT: 11.20 sec;   INR: 0.97 ratio         PTT - ( 20 Mar 2019 11:38 )  PTT:54.3 sec    ================== OTHER SIGNIFICANT FINDINGS ==================    < from: ERCP (03.20.19 @ 16:30) >  Summary:   ERCP performed with sphincterotomy, a stone was removed using stone extraction  balloon resulting in excellent biliary drainage.     < end of copied text >    ================== ALLERGIES ===================  No Known Drug Allergies  Originally Entered as [RASH] reaction to [cashew nut] (Unknown)    ==================== MEDS =====================  adenosine Injectable (ADENOSCAN) 60 milliGRAM(s) IV Bolus once  dextrose 5%. 1000 milliLiter(s) IV Continuous <Continuous>  dextrose 50% Injectable 12.5 Gram(s) IV Push once  dextrose 50% Injectable 25 Gram(s) IV Push once  dextrose 50% Injectable 25 Gram(s) IV Push once  diltiazem    milliGRAM(s) Oral daily  docusate sodium 100 milliGRAM(s) Oral three times a day  lactated ringers. 1000 milliLiter(s) IV Continuous <Continuous>  magnesium sulfate  IVPB 2 Gram(s) IV Intermittent once  magnesium sulfate  IVPB 2 Gram(s) IV Intermittent once  metoprolol tartrate 50 milliGRAM(s) Oral two times a day  pantoprazole    Tablet 40 milliGRAM(s) Oral before breakfast  simvastatin 40 milliGRAM(s) Oral at bedtime  sucralfate 1 Gram(s) Oral four times a day    PRN MEDICATIONS  dextrose 40% Gel 15 Gram(s) Oral once PRN  glucagon  Injectable 1 milliGRAM(s) IntraMuscular once PRN    ================= ASSESS/PLAN ==================  Patient is a 89y old Female who presents with a chief complaint of afib  with RVR in the endoscopy suite  pre ERCP ( cancelled) (19 Mar 2019 21:33)  Currently admitted to medicine with the primary diagnosis of     PLAN  89 y/o female with pmh of afib on xarelto, HTN, DM, HLD, cholecystectomy is being admitted for Afib with RVR.    # Afib with RVR likely 2/2 lack of Rate Controlling agents   -CHADVASC-5  s/p 1L LR  - Started on Cardizem 300mg and Metoprolol 50mg bid  - Afib but rate controlled on the monitor for past 48 hours, this morning rate around 100. No reason as to why patient would increase rate. Will monitor for now   - Per cardiology: needs a cardiac workup inpatient. Will send for NM stress test and echo.   - 2 sets of cardiac enzymes today   - Will restart the xarelto today     # Possible Choledocholithiasis  - ERCP performed with sphincterotomy, a stone was removed using stone extraction  balloon resulting in excellent biliary drainage.  - Liver enzymes resolved   - Will restart diet     # HTN  - c/w metoprolol 50mg daily and cardizem 300    # DM  - will hold home oral mediations  - start insulin lantus & add lispro as needed if FS >180    # DLD  - c/w atorvastatin daily    GI PPX: Pantoprazole   DVT PPX: Heparin Gtt    DIET: DASH diet   ACTIVITY:  () Ad Beatrice  /  (X) Advance as Tolerated  /  () Bed Rest  /  () Fall Precaution  /  () Seizure precaution    ================= PRESENT TODAY ==================    1-Desir Catheter: No  Indication:  2-Vascular Access:  [X]Peripheral | Indication:  []Midline | Indication:   []PICC Line | Indication:  []CVC | Indication:  []Arterial Line | Indication:  []Uldall Catheter | Indication:   3-IV Fluids: | Indication:  4-Ventilation: No | Sat:     ================= DISPOSITION ==================    Patient to be discharged when condition(s) optimized.            Discharge to:  (X) Home  /  () SNF  /  () HHA /  () Hospice / () Other             Home services:  () Home health  /  () Rehab  /  () IV Abx  /  () Education  /  () None            Counseled on/Discussed cessation of:  () Risky behaviors  /  () Smoking cessation  /  () Diet  /  () Exercise  /  () Other    ================= CODE STATUS =================                  (X) FULL CODE     |     () DNR     |     () DNI

## 2019-03-21 NOTE — PROGRESS NOTE ADULT - ASSESSMENT
Patient is a 89y old Female who presents with a chief complaint of afib  with RVR in the endoscopy suite  pre ERCP   Currently admitted to medicine with the primary diagnosis of atrial fibrillation with RVR. patient did not take his home meds the night before being admitted and came in for an elective ERCP procedure.   ERCP was done yesterday evening. The patient is borderline rate controlled. Cardiology input appreciated. Patient has no abdominal pain, N/V, melena.     PLAN  87 y/o female with pmh of afib on xarelto, HTN, DM, HLD, cholecystectomy is being admitted for Afib with RVR.    # Afib with RVR likely 2/2 lack of Rate Controlling agents   -CHADVASC-5  s/p 1L LR  - Started on Cardizem 300mg and Metoprolol 50mg bid  - Afib but rate controlled on the monitor for past 48 hours, this morning rate around 100. No reason as to why patient would increase rate. Will monitor for now   - Per cardiology: needs a cardiac workup inpatient. Will send for NM stress test and echo.   - 2 sets of cardiac enzymes today   - Will restart the xarelto today. D/c pending on GI to clear the patient.      # Possible Choledocholithiasis  - ERCP performed with sphincterotomy, a stone was removed using stone extraction  balloon resulting in excellent biliary drainage.  - Liver enzymes are normal today  - Will restart diet     # HTN  - c/w metoprolol 50mg daily and cardizem 300. As per Dr. Gates can go up to 75 mg for the AM dose of metoprolol but not more.     # DM  - will hold home oral mediations  - start insulin lantus & add lispro as needed if FS >180    # DLD  - c/w atorvastatin daily    GI PPX: Pantoprazole   DVT PPX: Heparin Gtt    DIET: DASH diet   ACTIVITY:  () Ad Beatrice  /  (X) Advance as Tolerated  /  () Bed Rest  /  () Fall Precaution  /  () Seizure precaution    ================= PRESENT TODAY ==================    1-Desir Catheter: No  Indication:  2-Vascular Access:  [X]Peripheral | Indication:  []Midline | Indication:   []PICC Line | Indication:  []CVC | Indication:  []Arterial Line | Indication:  []Uldall Catheter | Indication:   3-IV Fluids: | Indication:  4-Ventilation: No | Sat:     ================= DISPOSITION ==================    Patient to be discharged when condition(s) optimized.            Discharge to:  (X) Home  /  () SNF  /  () HHA /  () Hospice / () Other             Home services:  () Home health  /  () Rehab  /  () IV Abx  /  () Education  /  () None            Counseled on/Discussed cessation of:  () Risky behaviors  /  () Smoking cessation  /  () Diet  /  () Exercise  /  () Other    ================= CODE STATUS =================                  (X) FULL CODE     |     () DNR     |     () DNI

## 2019-03-21 NOTE — PROGRESS NOTE ADULT - ASSESSMENT
Atrial fibrillation with rapid ventricular response  Hx of cholelithiases, for ERCP to R/O choledocholithiases, procedure cancelled due to the afib with RVR  Hx of  HTN, ASHD, afib ( Xarelto)  Hx of Hyperlipidemia  Hx of DM II  Hx of GERD, cholelithiasis, cholecystectomy, diverticulosis  Hx of OA, DDD, DJD, osteoporosis    ERCP done 3/20 with sphincterotomy and removal of stone  monitoring cardiac rate and rhythm  appreciate cardio ff up  resume home meds inc cardizem and BB,  Xarelto was held for the procedure and pt was covered with heparin   check electrolytes inc, low Mg, replete  appreciate GI ff up  social Svc for safe D/C

## 2019-03-22 LAB
ANION GAP SERPL CALC-SCNC: 11 MMOL/L — SIGNIFICANT CHANGE UP (ref 7–14)
BASOPHILS # BLD AUTO: 0 K/UL — SIGNIFICANT CHANGE UP (ref 0–0.2)
BASOPHILS NFR BLD AUTO: 0 % — SIGNIFICANT CHANGE UP (ref 0–1)
BUN SERPL-MCNC: 28 MG/DL — HIGH (ref 10–20)
CALCIUM SERPL-MCNC: 9.1 MG/DL — SIGNIFICANT CHANGE UP (ref 8.5–10.1)
CHLORIDE SERPL-SCNC: 101 MMOL/L — SIGNIFICANT CHANGE UP (ref 98–110)
CO2 SERPL-SCNC: 24 MMOL/L — SIGNIFICANT CHANGE UP (ref 17–32)
CREAT SERPL-MCNC: 1 MG/DL — SIGNIFICANT CHANGE UP (ref 0.7–1.5)
EOSINOPHIL # BLD AUTO: 0.02 K/UL — SIGNIFICANT CHANGE UP (ref 0–0.7)
EOSINOPHIL NFR BLD AUTO: 0.3 % — SIGNIFICANT CHANGE UP (ref 0–8)
GLUCOSE BLDC GLUCOMTR-MCNC: 213 MG/DL — HIGH (ref 70–99)
GLUCOSE BLDC GLUCOMTR-MCNC: 244 MG/DL — HIGH (ref 70–99)
GLUCOSE BLDC GLUCOMTR-MCNC: 251 MG/DL — HIGH (ref 70–99)
GLUCOSE BLDC GLUCOMTR-MCNC: 253 MG/DL — HIGH (ref 70–99)
GLUCOSE SERPL-MCNC: 277 MG/DL — HIGH (ref 70–99)
HCT VFR BLD CALC: 33.3 % — LOW (ref 37–47)
HGB BLD-MCNC: 10.7 G/DL — LOW (ref 12–16)
IMM GRANULOCYTES NFR BLD AUTO: 0.8 % — HIGH (ref 0.1–0.3)
LYMPHOCYTES # BLD AUTO: 0.61 K/UL — LOW (ref 1.2–3.4)
LYMPHOCYTES # BLD AUTO: 8 % — LOW (ref 20.5–51.1)
MAGNESIUM SERPL-MCNC: 2.3 MG/DL — SIGNIFICANT CHANGE UP (ref 1.8–2.4)
MCHC RBC-ENTMCNC: 30 PG — SIGNIFICANT CHANGE UP (ref 27–31)
MCHC RBC-ENTMCNC: 32.1 G/DL — SIGNIFICANT CHANGE UP (ref 32–37)
MCV RBC AUTO: 93.3 FL — SIGNIFICANT CHANGE UP (ref 81–99)
MONOCYTES # BLD AUTO: 0.99 K/UL — HIGH (ref 0.1–0.6)
MONOCYTES NFR BLD AUTO: 12.9 % — HIGH (ref 1.7–9.3)
NEUTROPHILS # BLD AUTO: 5.99 K/UL — SIGNIFICANT CHANGE UP (ref 1.4–6.5)
NEUTROPHILS NFR BLD AUTO: 78 % — HIGH (ref 42.2–75.2)
NRBC # BLD: 0 /100 WBCS — SIGNIFICANT CHANGE UP (ref 0–0)
PHOSPHATE SERPL-MCNC: 4.1 MG/DL — SIGNIFICANT CHANGE UP (ref 2.1–4.9)
PLATELET # BLD AUTO: 189 K/UL — SIGNIFICANT CHANGE UP (ref 130–400)
POTASSIUM SERPL-MCNC: 4.8 MMOL/L — SIGNIFICANT CHANGE UP (ref 3.5–5)
POTASSIUM SERPL-SCNC: 4.8 MMOL/L — SIGNIFICANT CHANGE UP (ref 3.5–5)
RBC # BLD: 3.57 M/UL — LOW (ref 4.2–5.4)
RBC # FLD: 12.8 % — SIGNIFICANT CHANGE UP (ref 11.5–14.5)
SODIUM SERPL-SCNC: 136 MMOL/L — SIGNIFICANT CHANGE UP (ref 135–146)
T4 AB SER-ACNC: 4.8 UG/DL — SIGNIFICANT CHANGE UP (ref 4.6–12)
TROPONIN T SERPL-MCNC: <0.01 NG/ML — SIGNIFICANT CHANGE UP
TSH SERPL-MCNC: 1.39 UIU/ML — SIGNIFICANT CHANGE UP (ref 0.27–4.2)
TSH SERPL-MCNC: 1.41 UIU/ML — SIGNIFICANT CHANGE UP (ref 0.27–4.2)
WBC # BLD: 7.67 K/UL — SIGNIFICANT CHANGE UP (ref 4.8–10.8)
WBC # FLD AUTO: 7.67 K/UL — SIGNIFICANT CHANGE UP (ref 4.8–10.8)

## 2019-03-22 PROCEDURE — 93306 TTE W/DOPPLER COMPLETE: CPT | Mod: 26

## 2019-03-22 RX ORDER — INSULIN GLARGINE 100 [IU]/ML
10 INJECTION, SOLUTION SUBCUTANEOUS AT BEDTIME
Qty: 0 | Refills: 0 | Status: DISCONTINUED | OUTPATIENT
Start: 2019-03-22 | End: 2019-03-23

## 2019-03-22 RX ORDER — GLUCAGON INJECTION, SOLUTION 0.5 MG/.1ML
1 INJECTION, SOLUTION SUBCUTANEOUS ONCE
Qty: 0 | Refills: 0 | Status: DISCONTINUED | OUTPATIENT
Start: 2019-03-22 | End: 2019-03-23

## 2019-03-22 RX ORDER — DEXTROSE 50 % IN WATER 50 %
25 SYRINGE (ML) INTRAVENOUS ONCE
Qty: 0 | Refills: 0 | Status: DISCONTINUED | OUTPATIENT
Start: 2019-03-22 | End: 2019-03-23

## 2019-03-22 RX ORDER — SODIUM CHLORIDE 9 MG/ML
1000 INJECTION, SOLUTION INTRAVENOUS
Qty: 0 | Refills: 0 | Status: DISCONTINUED | OUTPATIENT
Start: 2019-03-22 | End: 2019-03-23

## 2019-03-22 RX ORDER — INSULIN LISPRO 100/ML
VIAL (ML) SUBCUTANEOUS
Qty: 0 | Refills: 0 | Status: DISCONTINUED | OUTPATIENT
Start: 2019-03-22 | End: 2019-03-23

## 2019-03-22 RX ORDER — DEXTROSE 50 % IN WATER 50 %
15 SYRINGE (ML) INTRAVENOUS ONCE
Qty: 0 | Refills: 0 | Status: DISCONTINUED | OUTPATIENT
Start: 2019-03-22 | End: 2019-03-23

## 2019-03-22 RX ORDER — INSULIN LISPRO 100/ML
3 VIAL (ML) SUBCUTANEOUS
Qty: 0 | Refills: 0 | Status: DISCONTINUED | OUTPATIENT
Start: 2019-03-22 | End: 2019-03-23

## 2019-03-22 RX ORDER — DEXTROSE 50 % IN WATER 50 %
12.5 SYRINGE (ML) INTRAVENOUS ONCE
Qty: 0 | Refills: 0 | Status: DISCONTINUED | OUTPATIENT
Start: 2019-03-22 | End: 2019-03-23

## 2019-03-22 RX ADMIN — Medication 50 MILLIGRAM(S): at 17:59

## 2019-03-22 RX ADMIN — Medication 1 GRAM(S): at 06:07

## 2019-03-22 RX ADMIN — Medication 3 UNIT(S): at 17:55

## 2019-03-22 RX ADMIN — Medication 100 MILLIGRAM(S): at 22:28

## 2019-03-22 RX ADMIN — INSULIN GLARGINE 10 UNIT(S): 100 INJECTION, SOLUTION SUBCUTANEOUS at 22:28

## 2019-03-22 RX ADMIN — Medication 4: at 17:55

## 2019-03-22 RX ADMIN — SIMVASTATIN 40 MILLIGRAM(S): 20 TABLET, FILM COATED ORAL at 22:28

## 2019-03-22 RX ADMIN — ADENOSINE 600 MILLIGRAM(S): 3 INJECTION INTRAVENOUS at 14:32

## 2019-03-22 RX ADMIN — Medication 1 GRAM(S): at 06:05

## 2019-03-22 RX ADMIN — Medication 300 MILLIGRAM(S): at 17:56

## 2019-03-22 RX ADMIN — Medication 100 MILLIGRAM(S): at 06:07

## 2019-03-22 RX ADMIN — PANTOPRAZOLE SODIUM 40 MILLIGRAM(S): 20 TABLET, DELAYED RELEASE ORAL at 06:07

## 2019-03-22 RX ADMIN — RIVAROXABAN 20 MILLIGRAM(S): KIT at 17:56

## 2019-03-22 RX ADMIN — Medication 50 MILLIGRAM(S): at 06:07

## 2019-03-22 NOTE — DISCHARGE NOTE PROVIDER - NSDCCPCAREPLAN_GEN_ALL_CORE_FT
PRINCIPAL DISCHARGE DIAGNOSIS  Diagnosis: Choledocholithiasis  Assessment and Plan of Treatment: Despite having a prior removal of you gallbladder, you had a stone in the gallbladder duct which was removed through an ERCP procedure with no complications. You were cleared by GI to resume food and the anticoagulation      SECONDARY DISCHARGE DIAGNOSES  Diagnosis: Rapid atrial fibrillation  Assessment and Plan of Treatment: The ERCP was originally postponed because your heart rate was extremely elevated and irregular called atrial fibrillaiton with RVR. Because of the sudden onset we had to rule out an ischemic cause after the ERCP was done. That's why you had the stress and the ECHO done. These showed:   Continue taking the cardizem and metoprolol as prescribed for rate control and the Xarelto for blood thinning.   Follow up with both your primary care physician and cardiologist within 2 weeks of discahrge PRINCIPAL DISCHARGE DIAGNOSIS  Diagnosis: Choledocholithiasis  Assessment and Plan of Treatment: Despite having a prior removal of you gallbladder, you had a stone in the gallbladder duct which was removed through an ERCP procedure with no complications. You were cleared by GI to resume food and the anticoagulation      SECONDARY DISCHARGE DIAGNOSES  Diagnosis: Rapid atrial fibrillation  Assessment and Plan of Treatment: The ERCP was originally postponed because your heart rate was extremely elevated and irregular called atrial fibrillaiton with RVR. Because of the sudden onset we had to rule out an ischemic cause after the ERCP was done. That's why you had the stress and the ECHO done.   The echo was clear of disease, your heart is beating well, the stress test did reveal a very small area of reversible ischemia which does not require any intervention or change in medications   Continue taking the cardizem and metoprolol as prescribed for rate control and the Xarelto for blood thinning. And the Simvastatin for coronary artery disease   Follow up with both your primary care physician and cardiologist within 2 weeks of discahrge

## 2019-03-22 NOTE — PROGRESS NOTE ADULT - ASSESSMENT
________________________________________________________________________________  DAILY PROGRESS NOTE:    ================== SUBJECTIVE ==================    LATRICIA JOSE ARMANDO  /   89y  /  Female  /  MRN#: [id number]  Patient is a 89y old Female who presents with a chief complaint of CBD stone (21 Mar 2019 15:19)  Currently admitted to medicine with the primary diagnosis of     HOSPITAL DAY: 3d     OVERNIGHT EVENTS: None    TELEMETRY EVENTS: RVR    TODAY: Patient was seen this morning at bedside. Currently, the patient reports no complaints. No chest pain, no shortness of breath and no palpitations     Review of systems is otherwise negative.    =================== OBJECTIVE ===================    VITAL SIGNS: Last 24 Hours  T(C): 36.1 (22 Mar 2019 05:49), Max: 36.2 (21 Mar 2019 20:56)  T(F): 97 (22 Mar 2019 05:49), Max: 97.2 (21 Mar 2019 20:56)  HR: 122 (22 Mar 2019 05:49) (63 - 122)  BP: 101/57 (22 Mar 2019 05:49) (101/57 - 160/65)  BP(mean): --  RR: 18 (22 Mar 2019 05:49) (18 - 18)  SpO2: --    PHYSICAL EXAM:  GENERAL: NAD, well-developed  CHEST/LUNG: Clear to auscultation bilaterally; No wheeze  HEART: Regular rate and rhythm; No murmurs, rubs, or gallops  ABDOMEN: Soft, Nontender, Nondistended; Bowel sounds present  EXTREMITIES:  2+ Peripheral Pulses, No clubbing, cyanosis, or edema  PSYCH: AAOx3  NEUROLOGY: non-focal  General:  Appearance is consistent with chronologic age.  No abnormal facies.   General: AA&Ox3.  Fund of knowledge is intact and normal.  Language with normal repetition, comprehension and naming.  Nondysarthric.    SKIN: No rashes or lesions    ===================== LABS =====================                        10.7   7.67  )-----------( 189      ( 22 Mar 2019 06:54 )             33.3     03-22    136  |  101  |  28<H>  ----------------------------<  277<H>  4.8   |  24  |  1.0    Ca    9.1      22 Mar 2019 06:54  Phos  4.1     03-22  Mg     2.3     03-22    TPro  5.5<L>  /  Alb  3.6  /  TBili  0.9  /  DBili  x   /  AST  27  /  ALT  44<H>  /  AlkPhos  109  03-21    PT/INR - ( 20 Mar 2019 11:38 )   PT: 11.20 sec;   INR: 0.97 ratio      PTT - ( 20 Mar 2019 11:38 )  PTT:54.3 sec    Troponin T, Serum: <0.01 ng/mL (03-22-19 @ 01:32)  Creatine Kinase, Serum: 26 U/L (03-21-19 @ 18:14)  Troponin T, Serum: <0.01 ng/mL (03-21-19 @ 18:14)    CARDIAC MARKERS ( 22 Mar 2019 01:32 )  x     / <0.01 ng/mL / x     / x     / x      CARDIAC MARKERS ( 21 Mar 2019 18:14 )  x     / <0.01 ng/mL / 26 U/L / x     / 2.4 ng/mL    ================== OTHER SIGNIFICANT FINDINGS ==================    < from: ERCP (03.20.19 @ 16:30) >  Summary:   ERCP performed with sphincterotomy, a stone was removed using stone extraction  balloon resulting in excellent biliary drainage.     < end of copied text >    ================== ALLERGIES ===================  No Known Drug Allergies  Originally Entered as [RASH] reaction to [cashew nut] (Unknown)    ==================== MEDS =====================  adenosine Injectable (ADENOSCAN) 60 milliGRAM(s) IV Bolus once  dextrose 5%. 1000 milliLiter(s) IV Continuous <Continuous>  dextrose 50% Injectable 12.5 Gram(s) IV Push once  dextrose 50% Injectable 25 Gram(s) IV Push once  dextrose 50% Injectable 25 Gram(s) IV Push once  diltiazem    milliGRAM(s) Oral daily  docusate sodium 100 milliGRAM(s) Oral three times a day  lactated ringers. 1000 milliLiter(s) IV Continuous <Continuous>  metoprolol tartrate 50 milliGRAM(s) Oral two times a day  pantoprazole    Tablet 40 milliGRAM(s) Oral before breakfast  rivaroxaban 20 milliGRAM(s) Oral every 24 hours  simvastatin 40 milliGRAM(s) Oral at bedtime  sucralfate 1 Gram(s) Oral four times a day    PRN MEDICATIONS  dextrose 40% Gel 15 Gram(s) Oral once PRN  glucagon  Injectable 1 milliGRAM(s) IntraMuscular once PRN      ================= ASSESS/PLAN ==================  Patient is a 89y old Female who presents with a chief complaint of afib  with RVR in the endoscopy suite  pre ERCP ( cancelled) (19 Mar 2019 21:33)  Currently admitted to medicine with the primary diagnosis of     PLAN  89 y/o female with pmh of afib on xarelto, HTN, DM, HLD, cholecystectomy is being admitted for Afib with RVR.    # Afib with RVR likely 2/2 lack of Rate Controlling agents   -CHADVASC-5  s/p 1L LR  - Started on Cardizem 300mg and Metoprolol 50mg bid  - Afib but rate controlled on the monitor for past 48 hours. Patient is controlled on average (60-70)) however has moments when heart rate reaches 100-110.   - Per cardiology: needs a cardiac workup inpatient. Will send for NM stress test and echo. 2 sets of cardiac enzymes negative   - Restarted Xarelto     # Drop in Hb   Patient dropped from 12 to 10.7 with increased BUN and creatinine   No overt signs of bleeding. Patient had ERCP done 2 days ago with sphincterectomy   - Monitor for now    # Choledocholithiasis  - ERCP performed with sphincterotomy, a stone was removed using stone extraction  balloon resulting in excellent biliary drainage.  - Liver enzymes resolved   - Will restart diet     # HTN  - c/w metoprolol 50mg daily and cardizem 300    # DM  - will hold home oral mediations  - start insulin lantus & add lispro as needed if FS >180    # DLD  - c/w atorvastatin daily    GI PPX: Pantoprazole   DVT PPX: Heparin Gtt    DIET: DASH diet   ACTIVITY:  () Ad Beatrice  /  (X) Advance as Tolerated  /  () Bed Rest  /  () Fall Precaution  /  () Seizure precaution    ================= PRESENT TODAY ==================    1-Desir Catheter: No  Indication:  2-Vascular Access:  [X]Peripheral | Indication:  []Midline | Indication:   []PICC Line | Indication:  []CVC | Indication:  []Arterial Line | Indication:  []Uldall Catheter | Indication:   3-IV Fluids: | Indication:  4-Ventilation: No | Sat:     ================= DISPOSITION ==================    Patient to be discharged when condition(s) optimized.            Discharge to:  (X) Home  /  () SNF  /  () HHA /  () Hospice / () Other             Home services:  () Home health  /  () Rehab  /  () IV Abx  /  () Education  /  () None            Counseled on/Discussed cessation of:  () Risky behaviors  /  () Smoking cessation  /  () Diet  /  () Exercise  /  () Other    ================= CODE STATUS =================                  (X) FULL CODE     |     () DNR     |     () DNI ________________________________________________________________________________  DAILY PROGRESS NOTE:    ================== SUBJECTIVE ==================    LATRICIA JOSE ARMANDO  /   89y  /  Female  /  MRN#: [id number]  Patient is a 89y old Female who presents with a chief complaint of CBD stone (21 Mar 2019 15:19)  Currently admitted to medicine with the primary diagnosis of     HOSPITAL DAY: 3d     OVERNIGHT EVENTS: None    TELEMETRY EVENTS: RVR    TODAY: Patient was seen this morning at bedside. Currently, the patient reports no complaints. No chest pain, no shortness of breath and no palpitations     Review of systems is otherwise negative.    =================== OBJECTIVE ===================    VITAL SIGNS: Last 24 Hours  T(C): 36.1 (22 Mar 2019 05:49), Max: 36.2 (21 Mar 2019 20:56)  T(F): 97 (22 Mar 2019 05:49), Max: 97.2 (21 Mar 2019 20:56)  HR: 122 (22 Mar 2019 05:49) (63 - 122)  BP: 101/57 (22 Mar 2019 05:49) (101/57 - 160/65)  BP(mean): --  RR: 18 (22 Mar 2019 05:49) (18 - 18)  SpO2: --    PHYSICAL EXAM:  GENERAL: NAD, well-developed  CHEST/LUNG: Clear to auscultation bilaterally; No wheeze  HEART: Regular rate and rhythm; No murmurs, rubs, or gallops  ABDOMEN: Soft, Nontender, Nondistended; Bowel sounds present  EXTREMITIES:  2+ Peripheral Pulses, No clubbing, cyanosis, or edema  PSYCH: AAOx3  NEUROLOGY: non-focal  General:  Appearance is consistent with chronologic age.  No abnormal facies.   General: AA&Ox3.  Fund of knowledge is intact and normal.  Language with normal repetition, comprehension and naming.  Nondysarthric.    SKIN: No rashes or lesions    ===================== LABS =====================                        10.7   7.67  )-----------( 189      ( 22 Mar 2019 06:54 )             33.3     03-22    136  |  101  |  28<H>  ----------------------------<  277<H>  4.8   |  24  |  1.0    Ca    9.1      22 Mar 2019 06:54  Phos  4.1     03-22  Mg     2.3     03-22    TPro  5.5<L>  /  Alb  3.6  /  TBili  0.9  /  DBili  x   /  AST  27  /  ALT  44<H>  /  AlkPhos  109  03-21    PT/INR - ( 20 Mar 2019 11:38 )   PT: 11.20 sec;   INR: 0.97 ratio      PTT - ( 20 Mar 2019 11:38 )  PTT:54.3 sec    Troponin T, Serum: <0.01 ng/mL (03-22-19 @ 01:32)  Creatine Kinase, Serum: 26 U/L (03-21-19 @ 18:14)  Troponin T, Serum: <0.01 ng/mL (03-21-19 @ 18:14)    CARDIAC MARKERS ( 22 Mar 2019 01:32 )  x     / <0.01 ng/mL / x     / x     / x      CARDIAC MARKERS ( 21 Mar 2019 18:14 )  x     / <0.01 ng/mL / 26 U/L / x     / 2.4 ng/mL    ================== OTHER SIGNIFICANT FINDINGS ==================  < from: Transthoracic Echocardiogram (03.22.19 @ 13:33) >  Summary:   1. Left ventricular ejection fraction, by visual estimation, is 55 to   60%.   2. Normal global left ventricular systolic function.   3. Normal left ventricular internal cavity size.   4. Spectral Doppler shows impaired relaxation pattern of left   ventricular myocardial filling (Grade I diastolic dysfunction).   5. Normal left atrial size.   6. Normal right atrial size.   7. There is no evidence of pericardial effusion.   8. Moderate mitral annular calcification.   9. Thickening and calcification of the anterior and posterior mitral   valve leaflets.  10. Trace tricuspid regurgitation.  11. Mild aortic regurgitation.    PHYSICIAN INTERPRETATION:  Left Ventricle: The left ventricular internal cavity size is normal. Left   ventricular wall thickness is normal. Global LV systolic function was   normal. Left ventricular ejection fraction, by visual estimation, is 55   to 60%. Spectral Doppler shows impaired relaxation pattern of left   ventricular myocardial filling (Grade I diastolic dysfunction).  Right Ventricle: The right ventricular size is normal. RV systolic   function is normal.  Left Atrium: Normal left atrial size.  Right Atrium: Normal right atrial size.  Pericardium: There is no evidence of pericardial effusion.  Mitral Valve: Moderate mitral valve regurgitation is seen. Thickening and   calcification of the anterior and posterior mitral valve leaflets. There   is moderate mitral annular calcification.  Tricuspid Valve: Trivial tricuspid regurgitation is visualized. The   tricuspid valve is normal.  Aortic Valve: Mild aortic valve regurgitation is seen. The aortic valve   is trileaflet. No evidence of aortic stenosis.  Pulmonic Valve: No indication of pulmonic valve regurgitation.    < end of copied text >    < from: Stress Test Pharmacologic Report (03.22.19 @ 14:32) >  Diagnois Line Nondiagnostic Adenosine stress test, correlate with MPI     < end of copied text >    < from: ERCP (03.20.19 @ 16:30) >  Summary:   ERCP performed with sphincterotomy, a stone was removed using stone extraction  balloon resulting in excellent biliary drainage.     < end of copied text >    ================== ALLERGIES ===================  No Known Drug Allergies  Originally Entered as [RASH] reaction to [cashew nut] (Unknown)    ==================== MEDS =====================  adenosine Injectable (ADENOSCAN) 60 milliGRAM(s) IV Bolus once  dextrose 5%. 1000 milliLiter(s) IV Continuous <Continuous>  dextrose 50% Injectable 12.5 Gram(s) IV Push once  dextrose 50% Injectable 25 Gram(s) IV Push once  dextrose 50% Injectable 25 Gram(s) IV Push once  diltiazem    milliGRAM(s) Oral daily  docusate sodium 100 milliGRAM(s) Oral three times a day  lactated ringers. 1000 milliLiter(s) IV Continuous <Continuous>  metoprolol tartrate 50 milliGRAM(s) Oral two times a day  pantoprazole    Tablet 40 milliGRAM(s) Oral before breakfast  rivaroxaban 20 milliGRAM(s) Oral every 24 hours  simvastatin 40 milliGRAM(s) Oral at bedtime  sucralfate 1 Gram(s) Oral four times a day    PRN MEDICATIONS  dextrose 40% Gel 15 Gram(s) Oral once PRN  glucagon  Injectable 1 milliGRAM(s) IntraMuscular once PRN      ================= ASSESS/PLAN ==================  Patient is a 89y old Female who presents with a chief complaint of afib  with RVR in the endoscopy suite  pre ERCP ( cancelled) (19 Mar 2019 21:33)  Currently admitted to medicine with the primary diagnosis of     PLAN  87 y/o female with pmh of afib on xarelto, HTN, DM, HLD, cholecystectomy is being admitted for Afib with RVR.    # Afib with RVR likely 2/2 lack of Rate Controlling agents   -CHADVASC-5  s/p 1L LR  - Started on Cardizem 300mg and Metoprolol 50mg bid  - Afib but rate controlled on the monitor for past 48 hours. Patient is controlled on average (60-70)) however has moments when heart rate reaches 100-110.   - Per cardiology: needs a cardiac workup inpatient. 2 sets of cardiac enzymes negative. NM stress test negative, ECHO unremarkable   - Restarted Xarelto     # Drop in Hb   Patient dropped from 12 to 10.7 with increased BUN and creatinine   No overt signs of bleeding. Patient had ERCP done 2 days ago with sphincterectomy   - Monitor for now    # Choledocholithiasis  - ERCP performed with sphincterotomy, a stone was removed using stone extraction  balloon resulting in excellent biliary drainage.  - Liver enzymes resolved   - Will restart diet     # HTN  - c/w metoprolol 50mg daily and cardizem 300    # DM  - will hold home oral mediations  - start insulin lantus & add lispro as needed if FS >180    # DLD  - c/w atorvastatin daily    GI PPX: Pantoprazole   DVT PPX: Heparin Gtt    DIET: DASH diet   ACTIVITY:  () Ad Beatrice  /  (X) Advance as Tolerated  /  () Bed Rest  /  () Fall Precaution  /  () Seizure precaution    ================= PRESENT TODAY ==================    1-Desir Catheter: No  Indication:  2-Vascular Access:  [X]Peripheral | Indication:  []Midline | Indication:   []PICC Line | Indication:  []CVC | Indication:  []Arterial Line | Indication:  []Uldall Catheter | Indication:   3-IV Fluids: | Indication:  4-Ventilation: No | Sat:     ================= DISPOSITION ==================    Patient to be discharged when condition(s) optimized.            Discharge to:  (X) Home  /  () SNF  /  () H[facility] /  () Hospice / () Other             Home services:  () Home health  /  () Rehab  /  () IV Abx  /  () Education  /  () None            Counseled on/Discussed cessation of:  () Risky behaviors  /  () Smoking cessation  /  () Diet  /  () Exercise  /  () Other    ================= CODE STATUS =================                  (X) FULL CODE     |     () DNR     |     () DNI ________________________________________________________________________________  DAILY PROGRESS NOTE:    ================== SUBJECTIVE ==================    LATRICIA JOSE ARMANDO  /   89y  /  Female  /  MRN#: [id number]  Patient is a 89y old Female who presents with a chief complaint of CBD stone (21 Mar 2019 15:19)  Currently admitted to medicine with the primary diagnosis of     HOSPITAL DAY: 3d     OVERNIGHT EVENTS: None    TELEMETRY EVENTS: RVR    TODAY: Patient was seen this morning at bedside. Currently, the patient reports no complaints. No chest pain, no shortness of breath and no palpitations     Review of systems is otherwise negative.    =================== OBJECTIVE ===================    VITAL SIGNS: Last 24 Hours  T(C): 36.1 (22 Mar 2019 05:49), Max: 36.2 (21 Mar 2019 20:56)  T(F): 97 (22 Mar 2019 05:49), Max: 97.2 (21 Mar 2019 20:56)  HR: 122 (22 Mar 2019 05:49) (63 - 122)  BP: 101/57 (22 Mar 2019 05:49) (101/57 - 160/65)  BP(mean): --  RR: 18 (22 Mar 2019 05:49) (18 - 18)  SpO2: --    PHYSICAL EXAM:  GENERAL: NAD, well-developed  CHEST/LUNG: Clear to auscultation bilaterally; No wheeze  HEART: Regular rate and rhythm; No murmurs, rubs, or gallops  ABDOMEN: Soft, Nontender, Nondistended; Bowel sounds present  EXTREMITIES:  2+ Peripheral Pulses, No clubbing, cyanosis, or edema  PSYCH: AAOx3  NEUROLOGY: non-focal  General:  Appearance is consistent with chronologic age.  No abnormal facies.   General: AA&Ox3.  Fund of knowledge is intact and normal.  Language with normal repetition, comprehension and naming.  Nondysarthric.    SKIN: No rashes or lesions    ===================== LABS =====================                        10.7   7.67  )-----------( 189      ( 22 Mar 2019 06:54 )             33.3     03-22    136  |  101  |  28<H>  ----------------------------<  277<H>  4.8   |  24  |  1.0    Ca    9.1      22 Mar 2019 06:54  Phos  4.1     03-22  Mg     2.3     03-22    TPro  5.5<L>  /  Alb  3.6  /  TBili  0.9  /  DBili  x   /  AST  27  /  ALT  44<H>  /  AlkPhos  109  03-21    PT/INR - ( 20 Mar 2019 11:38 )   PT: 11.20 sec;   INR: 0.97 ratio      PTT - ( 20 Mar 2019 11:38 )  PTT:54.3 sec    Troponin T, Serum: <0.01 ng/mL (03-22-19 @ 01:32)  Creatine Kinase, Serum: 26 U/L (03-21-19 @ 18:14)  Troponin T, Serum: <0.01 ng/mL (03-21-19 @ 18:14)    CARDIAC MARKERS ( 22 Mar 2019 01:32 )  x     / <0.01 ng/mL / x     / x     / x      CARDIAC MARKERS ( 21 Mar 2019 18:14 )  x     / <0.01 ng/mL / 26 U/L / x     / 2.4 ng/mL    ================== OTHER SIGNIFICANT FINDINGS ==================  < from: Transthoracic Echocardiogram (03.22.19 @ 13:33) >  Summary:   1. Left ventricular ejection fraction, by visual estimation, is 55 to   60%.   2. Normal global left ventricular systolic function.   3. Normal left ventricular internal cavity size.   4. Spectral Doppler shows impaired relaxation pattern of left   ventricular myocardial filling (Grade I diastolic dysfunction).   5. Normal left atrial size.   6. Normal right atrial size.   7. There is no evidence of pericardial effusion.   8. Moderate mitral annular calcification.   9. Thickening and calcification of the anterior and posterior mitral   valve leaflets.  10. Trace tricuspid regurgitation.  11. Mild aortic regurgitation.    PHYSICIAN INTERPRETATION:  Left Ventricle: The left ventricular internal cavity size is normal. Left   ventricular wall thickness is normal. Global LV systolic function was   normal. Left ventricular ejection fraction, by visual estimation, is 55   to 60%. Spectral Doppler shows impaired relaxation pattern of left   ventricular myocardial filling (Grade I diastolic dysfunction).  Right Ventricle: The right ventricular size is normal. RV systolic   function is normal.  Left Atrium: Normal left atrial size.  Right Atrium: Normal right atrial size.  Pericardium: There is no evidence of pericardial effusion.  Mitral Valve: Moderate mitral valve regurgitation is seen. Thickening and   calcification of the anterior and posterior mitral valve leaflets. There   is moderate mitral annular calcification.  Tricuspid Valve: Trivial tricuspid regurgitation is visualized. The   tricuspid valve is normal.  Aortic Valve: Mild aortic valve regurgitation is seen. The aortic valve   is trileaflet. No evidence of aortic stenosis.  Pulmonic Valve: No indication of pulmonic valve regurgitation.    < end of copied text >    < from: Stress Test Pharmacologic Report (03.22.19 @ 14:32) >  Diagnois Line Nondiagnostic Adenosine stress test, correlate with MPI     < end of copied text >    < from: ERCP (03.20.19 @ 16:30) >  Summary:   ERCP performed with sphincterotomy, a stone was removed using stone extraction  balloon resulting in excellent biliary drainage.     < end of copied text >    ================== ALLERGIES ===================  No Known Drug Allergies  Originally Entered as [RASH] reaction to [cashew nut] (Unknown)    ==================== MEDS =====================  adenosine Injectable (ADENOSCAN) 60 milliGRAM(s) IV Bolus once  dextrose 5%. 1000 milliLiter(s) IV Continuous <Continuous>  dextrose 50% Injectable 12.5 Gram(s) IV Push once  dextrose 50% Injectable 25 Gram(s) IV Push once  dextrose 50% Injectable 25 Gram(s) IV Push once  diltiazem    milliGRAM(s) Oral daily  docusate sodium 100 milliGRAM(s) Oral three times a day  lactated ringers. 1000 milliLiter(s) IV Continuous <Continuous>  metoprolol tartrate 50 milliGRAM(s) Oral two times a day  pantoprazole    Tablet 40 milliGRAM(s) Oral before breakfast  rivaroxaban 20 milliGRAM(s) Oral every 24 hours  simvastatin 40 milliGRAM(s) Oral at bedtime  sucralfate 1 Gram(s) Oral four times a day    PRN MEDICATIONS  dextrose 40% Gel 15 Gram(s) Oral once PRN  glucagon  Injectable 1 milliGRAM(s) IntraMuscular once PRN      ================= ASSESS/PLAN ==================  Patient is a 89y old Female who presents with a chief complaint of afib  with RVR in the endoscopy suite  pre ERCP ( cancelled) (19 Mar 2019 21:33)  Currently admitted to medicine with the primary diagnosis of     PLAN  87 y/o female with pmh of afib on xarelto, HTN, DM, HLD, cholecystectomy is being admitted for Afib with RVR.    # Afib with RVR likely 2/2 lack of Rate Controlling agents   -CHADVASC-5  s/p 1L LR  - Started on Cardizem 300mg and Metoprolol 50mg bid  - Afib but rate controlled on the monitor for past 48 hours. Patient is controlled on average (60-70)) however has moments when heart rate reaches 100-110.   - Per cardiology: needs a cardiac workup inpatient. 2 sets of cardiac enzymes negative. NM stress test negative, ECHO unremarkable   - Restarted Xarelto     # Drop in Hb   Patient dropped from 12 to 10.7 with increased BUN and creatinine   No overt signs of bleeding. Patient had ERCP done 2 days ago with sphincterectomy   - Monitor for now    # Choledocholithiasis  - ERCP performed with sphincterotomy, a stone was removed using stone extraction  balloon resulting in excellent biliary drainage.  - Liver enzymes resolved   - Will restart diet     # HTN  - c/w metoprolol 50mg daily and cardizem 300    # DM  - will hold home oral mediations  - start insulin lantus & add lispro as needed if FS >180    # DLD  - c/w atorvastatin daily    GI PPX: Pantoprazole   DVT PPX: Heparin Gtt    DIET: DASH diet   ACTIVITY:  () Ad Beatrice  /  (X) Advance as Tolerated  /  () Bed Rest  /  () Fall Precaution  /  () Seizure precaution    ================= PRESENT TODAY ==================    1-Desir Catheter: No  Indication:  2-Vascular Access:  [X]Peripheral | Indication:  []Midline | Indication:   []PICC Line | Indication:  []CVC | Indication:  []Arterial Line | Indication:  []Uldall Catheter | Indication:   3-IV Fluids: | Indication:  4-Ventilation: No | Sat:     ================= DISPOSITION ==================    Patient to be discharged when condition(s) optimized.            Discharge to:  (X) Home  /  () SNF  /  () [facility] /  () Hospice / () Other             Home services:  () Home health  /  () Rehab  /  () IV Abx  /  () Education  /  () None            Counseled on/Discussed cessation of:  () Risky behaviors  /  () Smoking cessation  /  () Diet  /  () Exercise  /  () Other    Discussed case with medical attending. Patient is cleared for discharge however she has no one to pick her up today and discussed with , home care will be fully set up for her tomorrow 3/23/2019. She will be discharged then     ================= CODE STATUS =================                  (X) FULL CODE     |     () DNR     |     () DNI ________________________________________________________________________________  DAILY PROGRESS NOTE:    ================== SUBJECTIVE ==================    LATRICIA JOSE ARMANDO  /   89y  /  Female  /  MRN#: [id number]  Patient is a 89y old Female who presents with a chief complaint of CBD stone (21 Mar 2019 15:19)  Currently admitted to medicine with the primary diagnosis of     HOSPITAL DAY: 3d     OVERNIGHT EVENTS: None    TELEMETRY EVENTS: RVR    TODAY: Patient was seen this morning at bedside. Currently, the patient reports no complaints. No chest pain, no shortness of breath and no palpitations     Review of systems is otherwise negative.    =================== OBJECTIVE ===================    VITAL SIGNS: Last 24 Hours  T(C): 36.1 (22 Mar 2019 05:49), Max: 36.2 (21 Mar 2019 20:56)  T(F): 97 (22 Mar 2019 05:49), Max: 97.2 (21 Mar 2019 20:56)  HR: 122 (22 Mar 2019 05:49) (63 - 122)  BP: 101/57 (22 Mar 2019 05:49) (101/57 - 160/65)  BP(mean): --  RR: 18 (22 Mar 2019 05:49) (18 - 18)  SpO2: --    PHYSICAL EXAM:  GENERAL: NAD, well-developed  CHEST/LUNG: Clear to auscultation bilaterally; No wheeze  HEART: Regular rate and rhythm; No murmurs, rubs, or gallops  ABDOMEN: Soft, Nontender, Nondistended; Bowel sounds present  EXTREMITIES:  2+ Peripheral Pulses, No clubbing, cyanosis, or edema  PSYCH: AAOx3  NEUROLOGY: non-focal  General:  Appearance is consistent with chronologic age.  No abnormal facies.   General: AA&Ox3.  Fund of knowledge is intact and normal.  Language with normal repetition, comprehension and naming.  Nondysarthric.    SKIN: No rashes or lesions    ===================== LABS =====================                        10.7   7.67  )-----------( 189      ( 22 Mar 2019 06:54 )             33.3     03-22    136  |  101  |  28<H>  ----------------------------<  277<H>  4.8   |  24  |  1.0    Ca    9.1      22 Mar 2019 06:54  Phos  4.1     03-22  Mg     2.3     03-22    TPro  5.5<L>  /  Alb  3.6  /  TBili  0.9  /  DBili  x   /  AST  27  /  ALT  44<H>  /  AlkPhos  109  03-21    PT/INR - ( 20 Mar 2019 11:38 )   PT: 11.20 sec;   INR: 0.97 ratio      PTT - ( 20 Mar 2019 11:38 )  PTT:54.3 sec    Troponin T, Serum: <0.01 ng/mL (03-22-19 @ 01:32)  Creatine Kinase, Serum: 26 U/L (03-21-19 @ 18:14)  Troponin T, Serum: <0.01 ng/mL (03-21-19 @ 18:14)    CARDIAC MARKERS ( 22 Mar 2019 01:32 )  x     / <0.01 ng/mL / x     / x     / x      CARDIAC MARKERS ( 21 Mar 2019 18:14 )  x     / <0.01 ng/mL / 26 U/L / x     / 2.4 ng/mL    ================== OTHER SIGNIFICANT FINDINGS ==================  < from: Transthoracic Echocardiogram (03.22.19 @ 13:33) >  Summary:   1. Left ventricular ejection fraction, by visual estimation, is 55 to   60%.   2. Normal global left ventricular systolic function.   3. Normal left ventricular internal cavity size.   4. Spectral Doppler shows impaired relaxation pattern of left   ventricular myocardial filling (Grade I diastolic dysfunction).   5. Normal left atrial size.   6. Normal right atrial size.   7. There is no evidence of pericardial effusion.   8. Moderate mitral annular calcification.   9. Thickening and calcification of the anterior and posterior mitral   valve leaflets.  10. Trace tricuspid regurgitation.  11. Mild aortic regurgitation.    PHYSICIAN INTERPRETATION:  Left Ventricle: The left ventricular internal cavity size is normal. Left   ventricular wall thickness is normal. Global LV systolic function was   normal. Left ventricular ejection fraction, by visual estimation, is 55   to 60%. Spectral Doppler shows impaired relaxation pattern of left   ventricular myocardial filling (Grade I diastolic dysfunction).  Right Ventricle: The right ventricular size is normal. RV systolic   function is normal.  Left Atrium: Normal left atrial size.  Right Atrium: Normal right atrial size.  Pericardium: There is no evidence of pericardial effusion.  Mitral Valve: Moderate mitral valve regurgitation is seen. Thickening and   calcification of the anterior and posterior mitral valve leaflets. There   is moderate mitral annular calcification.  Tricuspid Valve: Trivial tricuspid regurgitation is visualized. The   tricuspid valve is normal.  Aortic Valve: Mild aortic valve regurgitation is seen. The aortic valve   is trileaflet. No evidence of aortic stenosis.  Pulmonic Valve: No indication of pulmonic valve regurgitation.    < end of copied text >    < from: Stress Test Pharmacologic Report (03.22.19 @ 14:32) >  Diagnois Line Nondiagnostic Adenosine stress test, correlate with MPI     < end of copied text >    < from: NM Nuclear Stress Pharmacologic Multiple (03.22.19 @ 15:43) >  1.   Small reversible defect in the inferolateral wall the left ventricle   consistent with ischemia.  2.  Normal left ventricular wall motion and wall thickening.  3.  Left ventricular ejection fraction of 74% which is within the range   of normal    < end of copied text >      < from: ERCP (03.20.19 @ 16:30) >  Summary:   ERCP performed with sphincterotomy, a stone was removed using stone extraction  balloon resulting in excellent biliary drainage.     < end of copied text >    ================== ALLERGIES ===================  No Known Drug Allergies  Originally Entered as [RASH] reaction to [cashew nut] (Unknown)    ==================== MEDS =====================  adenosine Injectable (ADENOSCAN) 60 milliGRAM(s) IV Bolus once  dextrose 5%. 1000 milliLiter(s) IV Continuous <Continuous>  dextrose 50% Injectable 12.5 Gram(s) IV Push once  dextrose 50% Injectable 25 Gram(s) IV Push once  dextrose 50% Injectable 25 Gram(s) IV Push once  diltiazem    milliGRAM(s) Oral daily  docusate sodium 100 milliGRAM(s) Oral three times a day  lactated ringers. 1000 milliLiter(s) IV Continuous <Continuous>  metoprolol tartrate 50 milliGRAM(s) Oral two times a day  pantoprazole    Tablet 40 milliGRAM(s) Oral before breakfast  rivaroxaban 20 milliGRAM(s) Oral every 24 hours  simvastatin 40 milliGRAM(s) Oral at bedtime  sucralfate 1 Gram(s) Oral four times a day    PRN MEDICATIONS  dextrose 40% Gel 15 Gram(s) Oral once PRN  glucagon  Injectable 1 milliGRAM(s) IntraMuscular once PRN      ================= ASSESS/PLAN ==================  Patient is a 89y old Female who presents with a chief complaint of afib  with RVR in the endoscopy suite  pre ERCP ( cancelled) (19 Mar 2019 21:33)  Currently admitted to medicine with the primary diagnosis of     PLAN  87 y/o female with pmh of afib on xarelto, HTN, DM, HLD, cholecystectomy is being admitted for Afib with RVR.    # Afib with RVR likely 2/2 lack of Rate Controlling agents   -CHADVASC-5  s/p 1L LR  - Started on Cardizem 300mg and Metoprolol 50mg bid  - Afib but rate controlled on the monitor for past 48 hours. Patient is controlled on average (60-70)) however has moments when heart rate reaches 100-110.   - Per cardiology: needs a cardiac workup inpatient. 2 sets of cardiac enzymes negative. NM stress test with small reversible defect, ECHO unremarkable   - Restarted Xarelto     # Drop in Hb   Patient dropped from 12 to 10.7 with increased BUN and creatinine   No overt signs of bleeding. Patient had ERCP done 2 days ago with sphincterectomy   - Monitor for now    # Choledocholithiasis  - ERCP performed with sphincterotomy, a stone was removed using stone extraction  balloon resulting in excellent biliary drainage.  - Liver enzymes resolved   - Will restart diet     # HTN  - c/w metoprolol 50mg daily and cardizem 300    # DM  - will hold home oral mediations  - start insulin lantus & add lispro as needed if FS >180    # DLD  - c/w atorvastatin daily    GI PPX: Pantoprazole   DVT PPX: Heparin Gtt    DIET: DASH diet   ACTIVITY:  () Ad Beatrice  /  (X) Advance as Tolerated  /  () Bed Rest  /  () Fall Precaution  /  () Seizure precaution    ================= PRESENT TODAY ==================    1-Desir Catheter: No  Indication:  2-Vascular Access:  [X]Peripheral | Indication:  []Midline | Indication:   []PICC Line | Indication:  []CVC | Indication:  []Arterial Line | Indication:  []Uldall Catheter | Indication:   3-IV Fluids: | Indication:  4-Ventilation: No | Sat:     ================= DISPOSITION ==================    Patient to be discharged when condition(s) optimized.            Discharge to:  (X) Home  /  () SNF  /  () HHA /  () Hospice / () Other             Home services:  () Home health  /  () Rehab  /  () IV Abx  /  () Education  /  () None            Counseled on/Discussed cessation of:  () Risky behaviors  /  () Smoking cessation  /  () Diet  /  () Exercise  /  () Other    Discussed case with medical attending. Patient is cleared for discharge however she has no one to pick her up today and discussed with , home care will be fully set up for her tomorrow 3/23/2019. She will be discharged then     ================= CODE STATUS =================                  (X) FULL CODE     |     () DNR     |     () DNI

## 2019-03-22 NOTE — DISCHARGE NOTE PROVIDER - CARE PROVIDER_API CALL
Yandel Rubio)  Internal Medicine  [address], [address]  [place] [address]  Phone: [phone]  Fax: [phone]  Follow Up Time:     Bharath Gates)  Cardiovascular Disease; Internal Medicine; Interventional Cardiology  [address] [place] [address]  Phone: [phone]  Fax: [phone]  Follow Up Time: Yandel Rubio)  Internal Medicine  [address] [place] [address]  Phone: [phone]  Fax: [phone]  Follow Up Time:     Bharath Gates)  Cardiovascular Disease; Internal Medicine; Interventional Cardiology  [address] [place] [address]  Phone: [phone]  Fax: [phone]  Follow Up Time:     Migdalia Villa)  Medicine  [address] [place] [address]  Phone: [phone]  Fax: [phone]  Follow Up Time:

## 2019-03-22 NOTE — DISCHARGE NOTE PROVIDER - CARE PROVIDERS DIRECT ADDRESSES
,[email or web address]

## 2019-03-22 NOTE — DISCHARGE NOTE PROVIDER - HOSPITAL COURSE
89 y/o female with pmh of afib on xarelto, HTN, DM, HLD, cholecystectomy is being admitted for Afib with RVR.        # Afib with RVR likely 2/2 lack of Rate Controlling agents     -CHADVASC-5    s/p 1L LR    - Started on Cardizem 300mg and Metoprolol 50mg bid    - Afib but rate controlled on the monitor for past 48 hours. Patient is controlled on average (60-70)) however has moments when heart rate reaches 100-110.     - Per cardiology: needs a cardiac workup inpatient. Will send for NM stress test and echo. 2 sets of cardiac enzymes negative     - Restarted Xarelto         # Drop in Hb     Patient dropped from 12 to 10.7 with increased BUN and creatinine     No overt signs of bleeding. Patient had ERCP done 2 days ago with sphincterectomy     - Monitor for now        # Choledocholithiasis    - ERCP performed with sphincterotomy, a stone was removed using stone extraction    balloon resulting in excellent biliary drainage.    - Liver enzymes resolved     - Will restart diet         # HTN    - c/w metoprolol 50mg daily and cardizem 300        # DM    - will hold home oral mediations    - start insulin lantus & add lispro as needed if FS >180        # DLD    - c/w atorvastatin daily 89 y/o female with pmh of afib on xarelto, HTN, DM, HLD, cholecystectomy is being admitted for Afib with RVR.        # Afib with RVR likely 2/2 lack of Rate Controlling agents     -CHADVASC-5    s/p 1L LR    - Started on Cardizem 300mg and Metoprolol 50mg bid    - Afib but rate controlled on the monitor for past 48 hours. Patient is controlled on average (60-70)) however has moments when heart rate reaches 100-110.     - Per cardiology: needs a cardiac workup inpatient. NM stress with small reversible defect, and ECHO with EF of 50-60%.     - Restarted Xarelto         # Drop in Hb     Patient dropped from 12 to 10.7 with increased BUN and creatinine     No overt signs of bleeding. Patient had ERCP done 2 days ago with sphincterectomy     - Monitor for now. Repeat CBC after discharged in office         # Choledocholithiasis    - ERCP performed with sphincterotomy, a stone was removed using stone extraction    balloon resulting in excellent biliary drainage.    - Liver enzymes resolved     - Tolerating diet         # HTN    - c/w metoprolol 50mg daily and cardizem 300        # DM    - will hold home oral mediations    - start insulin lantus & add lispro as needed if FS >180        # DLD    - c/w atorvastatin daily

## 2019-03-22 NOTE — DISCHARGE NOTE PROVIDER - PROVIDER TOKENS
PROVIDER:[TOKEN:[[id number]]],PROVIDER:[TOKEN:[[id number]]] PROVIDER:[TOKEN:[[id number]]],PROVIDER:[TOKEN:[[id number]]],PROVIDER:[TOKEN:[[id number]]]

## 2019-03-22 NOTE — PROGRESS NOTE ADULT - ASSESSMENT
Atrial fibrillation with rapid ventricular response  Hx of cholelithiases, sp cholecystectomy, choledocholithiases  sp ERCP, sphincterotomy and removal of stone  Hx of  HTN, ASHD, afib ( Xarelto)  Hx of Hyperlipidemia  Hx of DM II  Hx of GERD, cholelithiasis, cholecystectomy, diverticulosis  Hx of OA, DDD, DJD, osteoporosis    ERCP done 3/20 with sphincterotomy and removal of stone  monitoring cardiac rate and rhythm, med adjustment  appreciate cardio ff up: ordered TTE and NM stress test  resume home meds inc cardizem and BBLenirelto resumed post procedure  check electrolytes inc, low Mg, repleted  appreciate GI ff up  social Svc for safe D/C

## 2019-03-22 NOTE — PROGRESS NOTE ADULT - ASSESSMENT
Patient is a 89y old Female who presents with a chief complaint of afib  with RVR in the endoscopy suite for elective ERCP (performed).   Currently admitted to medicine with the primary diagnosis of aflutter with RVR    PLAN  87 y/o female with pmh of afib on xarelto, HTN, DM, HLD, cholecystectomy is being admitted for Afib with RVR.    # Afib with RVR   -likely 2/2 lack of Rate Controlling agents for the day prior to admission  -CHADVASC-5  s/p 1L LR  - Started on Cardizem 300mg and Metoprolol 50mg bid  - Afib but rate controlled on the monitor for past 48 hours, this morning rate around 70. No reason as to why patient would increase rate. Will monitor for now   - Per cardiology: needs a cardiac workup inpatient. Will send for NM stress test and TTE.   - 2 sets of cardiac enzymes done and negative.   - started xeralto 20 mg yesterday. Slight drop of Hg from 12.4 to 10.7 and BUN vani to 28 from 19. will monitor     # Possible Choledocholithiasis  - ERCP performed with sphincterotomy, a stone was removed using stone extraction  balloon resulting in excellent biliary drainage.  - Liver enzymes resolved   - restarted on diet  -Cleared by GI and no more workup needed.     # HTN  - c/w metoprolol 50mg daily and cardizem 300 CD    # DM  - will hold home oral mediations  - start insulin lantus & add lispro as needed if FS >180    # DLD  - c/w atorvastatin daily    GI PPX: Pantoprazole   DVT PPX: Heparin Gtt    DIET: DASH diet   ACTIVITY:  () Ad Beatrice  /  (X) Advance as Tolerated  /  () Bed Rest  /  () Fall Precaution  /  () Seizure precaution    ================= PRESENT TODAY ==================    1-Desir Catheter: No  Indication:  2-Vascular Access:  [X]Peripheral | Indication:  []Midline | Indication:   []PICC Line | Indication:  []CVC | Indication:  []Arterial Line | Indication:  []Uldall Catheter | Indication:   3-IV Fluids: | Indication:  4-Ventilation: No | Sat:     ================= DISPOSITION ==================    Patient to be discharged when condition(s) optimized.            Discharge to:  (X) Home  /  () SNF  /  () HHA /  () Hospice / () Other             Home services:  () Home health  /  () Rehab  /  () IV Abx  /  () Education  /  () None            Counseled on/Discussed cessation of:  () Risky behaviors  /  () Smoking cessation  /  () Diet  /  () Exercise  /  () Other    ================= CODE STATUS =================                  (X) FULL CODE     |     () DNR     |     () DNI

## 2019-03-23 VITALS
DIASTOLIC BLOOD PRESSURE: 60 MMHG | SYSTOLIC BLOOD PRESSURE: 130 MMHG | TEMPERATURE: 97 F | RESPIRATION RATE: 18 BRPM | HEART RATE: 71 BPM

## 2019-03-23 LAB
ALBUMIN SERPL ELPH-MCNC: 3.5 G/DL — SIGNIFICANT CHANGE UP (ref 3.5–5.2)
ALP SERPL-CCNC: 89 U/L — SIGNIFICANT CHANGE UP (ref 30–115)
ALT FLD-CCNC: 29 U/L — SIGNIFICANT CHANGE UP (ref 0–41)
ANION GAP SERPL CALC-SCNC: 11 MMOL/L — SIGNIFICANT CHANGE UP (ref 7–14)
AST SERPL-CCNC: 15 U/L — SIGNIFICANT CHANGE UP (ref 0–41)
BILIRUB SERPL-MCNC: 0.5 MG/DL — SIGNIFICANT CHANGE UP (ref 0.2–1.2)
BLD GP AB SCN SERPL QL: SIGNIFICANT CHANGE UP
BUN SERPL-MCNC: 25 MG/DL — HIGH (ref 10–20)
CALCIUM SERPL-MCNC: 8.8 MG/DL — SIGNIFICANT CHANGE UP (ref 8.5–10.1)
CHLORIDE SERPL-SCNC: 102 MMOL/L — SIGNIFICANT CHANGE UP (ref 98–110)
CO2 SERPL-SCNC: 25 MMOL/L — SIGNIFICANT CHANGE UP (ref 17–32)
CREAT SERPL-MCNC: 0.8 MG/DL — SIGNIFICANT CHANGE UP (ref 0.7–1.5)
ESTIMATED AVERAGE GLUCOSE: 140 MG/DL — HIGH (ref 68–114)
GLUCOSE BLDC GLUCOMTR-MCNC: 194 MG/DL — HIGH (ref 70–99)
GLUCOSE BLDC GLUCOMTR-MCNC: 284 MG/DL — HIGH (ref 70–99)
GLUCOSE SERPL-MCNC: 218 MG/DL — HIGH (ref 70–99)
HBA1C BLD-MCNC: 6.5 % — HIGH (ref 4–5.6)
HCT VFR BLD CALC: 34.1 % — LOW (ref 37–47)
HGB BLD-MCNC: 10.8 G/DL — LOW (ref 12–16)
MCHC RBC-ENTMCNC: 30.4 PG — SIGNIFICANT CHANGE UP (ref 27–31)
MCHC RBC-ENTMCNC: 31.7 G/DL — LOW (ref 32–37)
MCV RBC AUTO: 96.1 FL — SIGNIFICANT CHANGE UP (ref 81–99)
NRBC # BLD: 0 /100 WBCS — SIGNIFICANT CHANGE UP (ref 0–0)
PLATELET # BLD AUTO: 167 K/UL — SIGNIFICANT CHANGE UP (ref 130–400)
POTASSIUM SERPL-MCNC: 5.1 MMOL/L — HIGH (ref 3.5–5)
POTASSIUM SERPL-SCNC: 5.1 MMOL/L — HIGH (ref 3.5–5)
PROT SERPL-MCNC: 5.3 G/DL — LOW (ref 6–8)
RBC # BLD: 3.55 M/UL — LOW (ref 4.2–5.4)
RBC # FLD: 12.8 % — SIGNIFICANT CHANGE UP (ref 11.5–14.5)
SODIUM SERPL-SCNC: 138 MMOL/L — SIGNIFICANT CHANGE UP (ref 135–146)
TYPE + AB SCN PNL BLD: SIGNIFICANT CHANGE UP
WBC # BLD: 7.61 K/UL — SIGNIFICANT CHANGE UP (ref 4.8–10.8)
WBC # FLD AUTO: 7.61 K/UL — SIGNIFICANT CHANGE UP (ref 4.8–10.8)

## 2019-03-23 RX ADMIN — Medication 300 MILLIGRAM(S): at 06:30

## 2019-03-23 RX ADMIN — Medication 100 MILLIGRAM(S): at 14:24

## 2019-03-23 RX ADMIN — Medication 50 MILLIGRAM(S): at 06:30

## 2019-03-23 RX ADMIN — Medication 1 GRAM(S): at 06:31

## 2019-03-23 RX ADMIN — Medication 100 MILLIGRAM(S): at 06:31

## 2019-03-23 RX ADMIN — PANTOPRAZOLE SODIUM 40 MILLIGRAM(S): 20 TABLET, DELAYED RELEASE ORAL at 06:31

## 2019-03-23 RX ADMIN — Medication 2: at 07:54

## 2019-03-23 RX ADMIN — Medication 6: at 11:56

## 2019-03-23 RX ADMIN — Medication 3 UNIT(S): at 11:56

## 2019-03-23 RX ADMIN — Medication 1 GRAM(S): at 11:56

## 2019-03-23 RX ADMIN — Medication 3 UNIT(S): at 07:55

## 2019-03-23 NOTE — DISCHARGE NOTE NURSING/CASE MANAGEMENT/SOCIAL WORK - NSDCDPATPORTLINK_GEN_ALL_CORE
You can access the [facility], offered by [facility], by registering with the following website: [email or web address]

## 2019-03-23 NOTE — PROGRESS NOTE ADULT - ATTENDING COMMENTS
ADITYA Vidal
SIRI Vidal
pt for bandar thal, echo.
pt w/aflutter w/ elev hr in am. advise check tfts,echo,adenosine thal.
thal noted. follow on medical therapy. f/u as op.

## 2019-03-23 NOTE — PROGRESS NOTE ADULT - SUBJECTIVE AND OBJECTIVE BOX
89yo W Female admitted from the endoscopy suite for afib with RVR.  The pt was to have elective ERCP when it was noted that her HR was in the 140s.  The pt denied CP, palpitations, SOB, N, V.  The pt failed to take her meds the night pior to and the day of the procedure as she was told to be NPO. The cardiac meds are resumed and pt is admitted to telemetry awaiting cardio clearance for possible ERCP tomorrow.  The PMHX includes:  HTN, ASHD, afib (Xarelto),  HLD, DMII, OA, DDD, DJD, sp hip surgery.      INTERVAL HPI/OVERNIGHT EVENTS: remains on tele, sp ERCP 3/20 with sphincterotomy and removal of stone, variable rate control, repleted Mg, pt feels well, cardio ordered TTE and NM stress test    PAST MEDICAL & SURGICAL HISTORY:  Atrial fibrillation  High blood cholesterol  Hypertension  Diabetes mellitus, type 2  History of hip surgery    MEDICATIONS  (STANDING):  dextrose 5%. 1000 milliLiter(s) (50 mL/Hr) IV Continuous <Continuous>  dextrose 5%. 1000 milliLiter(s) (50 mL/Hr) IV Continuous <Continuous>  dextrose 50% Injectable 12.5 Gram(s) IV Push once  dextrose 50% Injectable 25 Gram(s) IV Push once  dextrose 50% Injectable 25 Gram(s) IV Push once  dextrose 50% Injectable 12.5 Gram(s) IV Push once  dextrose 50% Injectable 25 Gram(s) IV Push once  dextrose 50% Injectable 25 Gram(s) IV Push once  diltiazem    milliGRAM(s) Oral daily  docusate sodium 100 milliGRAM(s) Oral three times a day  insulin glargine Injectable (LANTUS) 10 Unit(s) SubCutaneous at bedtime  insulin lispro (HumaLOG) corrective regimen sliding scale   SubCutaneous three times a day before meals  insulin lispro Injectable (HumaLOG) 3 Unit(s) SubCutaneous three times a day before meals  lactated ringers. 1000 milliLiter(s) (100 mL/Hr) IV Continuous <Continuous>  metoprolol tartrate 50 milliGRAM(s) Oral two times a day  pantoprazole    Tablet 40 milliGRAM(s) Oral before breakfast  rivaroxaban 20 milliGRAM(s) Oral every 24 hours  simvastatin 40 milliGRAM(s) Oral at bedtime  sucralfate 1 Gram(s) Oral four times a day    MEDICATIONS  (PRN):  dextrose 40% Gel 15 Gram(s) Oral once PRN Blood Glucose LESS THAN 70 milliGRAM(s)/deciliter  dextrose 40% Gel 15 Gram(s) Oral once PRN Blood Glucose LESS THAN 70 milliGRAM(s)/deciliter  glucagon  Injectable 1 milliGRAM(s) IntraMuscular once PRN Glucose LESS THAN 70 milligrams/deciliter  glucagon  Injectable 1 milliGRAM(s) IntraMuscular once PRN Glucose LESS THAN 70 milligrams/deciliter    Vital Signs Last 24 Hrs  T(C): 35.8 (23 Mar 2019 05:47), Max: 35.8 (23 Mar 2019 05:47)  T(F): 96.4 (23 Mar 2019 05:47), Max: 96.4 (23 Mar 2019 05:47)  HR: 90 (23 Mar 2019 05:47) (90 - 137)  BP: 113/59 (23 Mar 2019 05:47) (101/63 - 137/94)  BP(mean): --  RR: 18 (23 Mar 2019 05:47) (18 - 18)  SpO2: --    PHYSICAL EXAM:      Constitutional:  pt alert and comfortable, elderly WF, NAD  Eyes:  nonicteric  ENMT:  PERRLA  EOMI  Neck:  supple, no JVD, no bruits  Back:  kyphosis  Respiratory:  shallow  respirations  Cardiovascular:  S1S2 irreg  Gastrointestinal:  globular soft and benign  Extremities:  moves all ext, arthritic changes
LATRICIA SUÁREZ 89yo W Female admitted from the endoscopy suite for afib with RVR.  The pt was to have elective ERCP when it was noted that her HR was in the 140s.  The pt denied CP, palpitations, SOB, N, V.  The pt failed to take her meds the night pior to and the day of the procedure as she was told to be NPO. The cardiac meds are resumed and pt is admitted to telemetry awaiting cardio clearance for possible ERCP tomorrow.  The PMHX includes:  HTN, ASHD, afib (Xarelto),  HLD, DMII, OA, DDD, DJD, sp hip surgery.      INTERVAL HPI/OVERNIGHT EVENTS: ad to tele, sp ERCP 3/20 eith sphincterotomy and removal of stone, variable rate control, repleting Mg    MEDICATIONS  (STANDING):  dextrose 5%. 1000 milliLiter(s) (50 mL/Hr) IV Continuous <Continuous>  dextrose 50% Injectable 12.5 Gram(s) IV Push once  dextrose 50% Injectable 25 Gram(s) IV Push once  dextrose 50% Injectable 25 Gram(s) IV Push once  diltiazem    milliGRAM(s) Oral daily  docusate sodium 100 milliGRAM(s) Oral three times a day  heparin  Infusion 1300 Unit(s)/Hr (13 mL/Hr) IV Continuous <Continuous>  lactated ringers. 1000 milliLiter(s) (100 mL/Hr) IV Continuous <Continuous>  metoprolol tartrate 50 milliGRAM(s) Oral two times a day  pantoprazole    Tablet 40 milliGRAM(s) Oral before breakfast  simvastatin 40 milliGRAM(s) Oral at bedtime  sucralfate 1 Gram(s) Oral four times a day    MEDICATIONS  (PRN):  dextrose 40% Gel 15 Gram(s) Oral once PRN Blood Glucose LESS THAN 70 milliGRAM(s)/deciliter  glucagon  Injectable 1 milliGRAM(s) IntraMuscular once PRN Glucose LESS THAN 70 milligrams/deciliter      Allergies    No Known Drug Allergies  Originally Entered as [RASH] reaction to [cashew nut] (Unknown)	    Vital Signs Last 24 Hrs    T(F): 96.9  HR:  120-80  BP: 160/65    RR: 18   SpO2: --95%    PHYSICAL EXAM:      Constitutional:  pt alert and comfortable, elderly WF, NAD    Eyes:  nonicteric    ENMT:  dry oral mucosa    Neck:  supple, no JVD, no bruits    Back:  kyphosis    Respiratory:  shallow  respirations    Cardiovascular:  S1S2 irreg    Gastrointestinal:  globular soft and benign    Extremities:  moves all ext, arthritic changes    LABS:  WBC 7.2, H/H 11.4/35, Plts 196    Na 141, K 4.5, Cl 105, CO2 24, BUN/creat 17/0.7,  glu 149, Mg 1.7  GFR  77                RADIOLOGY & ADDITIONAL TESTS:    ERCP 3/20:  sphincterotomy, removal os stone, excellent biliary drainage    X ray of abd:  RUQ clips, clip over esophagus, no definit pneumoperitoneum
LATRICIA SUÁREZ 88yo W Female admitted from the endoscopy suite for afib with RVR.  The pt was to have elective ERCP when it was noted that her HR was in the 140s.  The pt denied CP, palpitations, SOB, N, V.  The pt failed to take her meds the night pior to and the day of the procedure as she was told to be NPO. The cardiac meds are resumed and pt is admitted to telemetry awaiting cardio clearance for possible ERCP tomorrow.  The PMHX includes:  HTN, ASHD, afib (Xarelto),  HLD, DMII, OA, DDD, DJD, sp hip surgery.      INTERVAL HPI/OVERNIGHT EVENTS: remains on tele, sp ERCP 3/20 with sphincterotomy and removal of stone, variable rate control, repleted Mg, pt feels well, cardio ordered TTE and NM stress test    MEDICATIONS  (STANDING):  dextrose 5%. 1000 milliLiter(s) (50 mL/Hr) IV Continuous <Continuous>  dextrose 50% Injectable 12.5 Gram(s) IV Push once  dextrose 50% Injectable 25 Gram(s) IV Push once  dextrose 50% Injectable 25 Gram(s) IV Push once  diltiazem    milliGRAM(s) Oral daily  docusate sodium 100 milliGRAM(s) Oral three times a day  heparin  Infusion 1300 Unit(s)/Hr (13 mL/Hr) IV Continuous <Continuous>  lactated ringers. 1000 milliLiter(s) (100 mL/Hr) IV Continuous <Continuous>  metoprolol tartrate 50 milliGRAM(s) Oral two times a day  pantoprazole    Tablet 40 milliGRAM(s) Oral before breakfast  simvastatin 40 milliGRAM(s) Oral at bedtime  sucralfate 1 Gram(s) Oral four times a day    MEDICATIONS  (PRN):  dextrose 40% Gel 15 Gram(s) Oral once PRN Blood Glucose LESS THAN 70 milliGRAM(s)/deciliter  glucagon  Injectable 1 milliGRAM(s) IntraMuscular once PRN Glucose LESS THAN 70 milligrams/deciliter      Allergies    No Known Drug Allergies  Originally Entered as [RASH] reaction to [cashew nut] (Unknown)	    Vital Signs Last 24 Hrs    T(F): 97.2  HR:  122-63  BP: 101/57    RR: 18   SpO2: --95%    PHYSICAL EXAM:      Constitutional:  pt alert and comfortable, elderly WF, NAD    Eyes:  nonicteric    ENMT:  dry oral mucosa    Neck:  supple, no JVD, no bruits    Back:  kyphosis    Respiratory:  shallow  respirations    Cardiovascular:  S1S2 irreg    Gastrointestinal:  globular soft and benign    Extremities:  moves all ext, arthritic changes    LABS:  WBC 7.6,  H/H 10.7/33, Plts 189    Na 136, K 4.8, Cl 101, CO2 24, BUN/creat 28/1,  glu 277, Mg 1.7, 2.3  GFR  77, 50                RADIOLOGY & ADDITIONAL TESTS:    ERCP 3/20:  sphincterotomy, removal os stone, excellent biliary drainage    X ray of abd:  RUQ clips, clip over esophagus, no definit pneumoperitoneum
LATRICIA SUÁREZ 89yo W Female admitted from the endoscopy suite for afib with RVR.  The pt was to have elective ERCP when it was noted that her HR was in the 140s.  The pt denied CP, palpitations, SOB, N, V.  The pt failed to take her meds the night pior to and the day of the procedure as she was told to be NPO. The cardiac meds are resumed and pt is admitted to telemetry awaiting cardio clearance for possible ERCP tomorrow.  The PMHX includes:  HTN, ASHD, afib (Xarelto),  HLD, DMII, OA, DDD, DJD, sp hip surgery.      INTERVAL HPI/OVERNIGHT EVENTS: ad to tele for cardiac stabilization, feels well     MEDICATIONS  (STANDING):  dextrose 5%. 1000 milliLiter(s) (50 mL/Hr) IV Continuous <Continuous>  dextrose 50% Injectable 12.5 Gram(s) IV Push once  dextrose 50% Injectable 25 Gram(s) IV Push once  dextrose 50% Injectable 25 Gram(s) IV Push once  diltiazem    milliGRAM(s) Oral daily  docusate sodium 100 milliGRAM(s) Oral three times a day  heparin  Infusion 1300 Unit(s)/Hr (13 mL/Hr) IV Continuous <Continuous>  lactated ringers. 1000 milliLiter(s) (100 mL/Hr) IV Continuous <Continuous>  metoprolol tartrate 50 milliGRAM(s) Oral two times a day  pantoprazole    Tablet 40 milliGRAM(s) Oral before breakfast  simvastatin 40 milliGRAM(s) Oral at bedtime  sucralfate 1 Gram(s) Oral four times a day    MEDICATIONS  (PRN):  dextrose 40% Gel 15 Gram(s) Oral once PRN Blood Glucose LESS THAN 70 milliGRAM(s)/deciliter  glucagon  Injectable 1 milliGRAM(s) IntraMuscular once PRN Glucose LESS THAN 70 milligrams/deciliter      Allergies    No Known Drug Allergies  Originally Entered as [RASH] reaction to [cashew nut] (Unknown)	    Vital Signs Last 24 Hrs  T(C): 36.2 (19 Mar 2019 20:57), Max: 36.4 (19 Mar 2019 09:05)  T(F): 97.2 (19 Mar 2019 20:57), Max: 97.5 (19 Mar 2019 09:05)  HR: 67 (19 Mar 2019 20:57) (66 - 140)  BP: 135/58 (19 Mar 2019 20:57) (112/78 - 140/77)  BP(mean): --  RR: 18 (19 Mar 2019 20:57) (17 - 20)  SpO2: --    PHYSICAL EXAM:      Constitutional:  pt alert and comfortable, elderly WF, NAD    Eyes:  nonicteric    ENMT:  dry oral mucosa    Neck:  supple, no JVD, no bruits    Back:  kyphosis    Respiratory:  shallow  respirations    Cardiovascular:  S1S2 irreg    Gastrointestinal:  globular soft and benign    Extremities:  moves all ext, arthritic changes    LABS:                RADIOLOGY & ADDITIONAL TESTS:
LATRICIA SUÁREZ 89yo W Female admitted from the endoscopy suite for afib with RVR.  The pt was to have elective ERCP when it was noted that her HR was in the 140s.  The pt denied CP, palpitations, SOB, N, V.  The pt failed to take her meds the night pior to and the day of the procedure as she was told to be NPO. The cardiac meds are resumed and pt is admitted to telemetry awaiting cardio clearance for possible ERCP tomorrow.  The PMHX includes:  HTN, ASHD, afib (Xarelto),  HLD, DMII, OA, DDD, DJD, sp hip surgery.      INTERVAL HPI/OVERNIGHT EVENTS: ad to tele, rate better controlled, evaluated by cardio, heparin held for ERCP, low Mg replete    MEDICATIONS  (STANDING):  dextrose 5%. 1000 milliLiter(s) (50 mL/Hr) IV Continuous <Continuous>  dextrose 50% Injectable 12.5 Gram(s) IV Push once  dextrose 50% Injectable 25 Gram(s) IV Push once  dextrose 50% Injectable 25 Gram(s) IV Push once  diltiazem    milliGRAM(s) Oral daily  docusate sodium 100 milliGRAM(s) Oral three times a day  heparin  Infusion 1300 Unit(s)/Hr (13 mL/Hr) IV Continuous <Continuous>  lactated ringers. 1000 milliLiter(s) (100 mL/Hr) IV Continuous <Continuous>  metoprolol tartrate 50 milliGRAM(s) Oral two times a day  pantoprazole    Tablet 40 milliGRAM(s) Oral before breakfast  simvastatin 40 milliGRAM(s) Oral at bedtime  sucralfate 1 Gram(s) Oral four times a day    MEDICATIONS  (PRN):  dextrose 40% Gel 15 Gram(s) Oral once PRN Blood Glucose LESS THAN 70 milliGRAM(s)/deciliter  glucagon  Injectable 1 milliGRAM(s) IntraMuscular once PRN Glucose LESS THAN 70 milligrams/deciliter      Allergies    No Known Drug Allergies  Originally Entered as [RASH] reaction to [cashew nut] (Unknown)	    Vital Signs Last 24 Hrs    T(F): 97.2   HR: 67   BP: 135/58    RR: 18   SpO2: --    PHYSICAL EXAM:      Constitutional:  pt alert and comfortable, elderly WF, NAD    Eyes:  nonicteric    ENMT:  dry oral mucosa    Neck:  supple, no JVD, no bruits    Back:  kyphosis    Respiratory:  shallow  respirations    Cardiovascular:  S1S2 irreg    Gastrointestinal:  globular soft and benign    Extremities:  moves all ext, arthritic changes    LABS:  WBC 7.2, H/H 11.4/35, Plts 196    Na 141, K 4.5, Cl 105, CO2 24, BUN/creat 17/0.7,  glu 149, Mg 1.7  GFR  77                RADIOLOGY & ADDITIONAL TESTS:
Patient is a 89y old  Female who presents with a chief complaint of cardiac arrhythmia (22 Mar 2019 11:32)      HPI:  89 y/o female with pmh of afib on xarelto, HTN, DM, HLD, cholecystectomy is being admitted for Afib with RVR.    Pt presented for an o/p ERCP after her recent dx of Choledocholithiasis in 1/19. In the Endoscopy suite, pt was found to have a HR in 140s, BP stable, but her ERCP was cancelled by Anesthesia. Pt is being admitted for cardiology clearance, stabilization and possible ERCP AM.    Pt denied any acute complaints including CP, SOB. She did not take her meds this morning or last night since she was told not to take anything PO. (19 Mar 2019 09:46)      INTERVAL HPI/OVERNIGHT EVENTS: Pt seen and examined at bedside, in NAD. Denies N/V/D, abdominal pain, CP, SOB, palpitations, fevers. Pt denies any abdpain or symptoms - wants to go home.  LFT's today are normal. Pts gb was removed > 10yrs ago    Abd- soft non tender no masses or organomegaly      REVIEW OF SYSTEMS:  CONSTITUTIONAL: No fever, weight loss, or fatigue  EYES: No eye pain, visual disturbances, or discharge  ENMT:  No difficulty hearing, tinnitus, vertigo; No sinus or throat pain  NECK: No pain or stiffness  BREASTS: No pain, no masses,   RESPIRATORY: No cough, wheezing, chills or hemoptysis; No shortness of breath  CARDIOVASCULAR: No chest pain, palpitations, dizziness, or leg swelling  GASTROINTESTINAL: No abdominal or epigastric pain. No nausea, vomiting, or hematemesis; No diarrhea or constipation. No melena or hematochezia.  GENITOURINARY: No dysuria, frequency, hematuria, or incontinence  NEUROLOGICAL: No headaches, memory loss, loss of strength, numbness, or tremors  SKIN: No itching, burning, rashes, or lesions   LYMPH NODES: No enlarged glands  MUSCULOSKELETAL: No joint pain or swelling; No muscle, back, or extremity pain  PSYCHIATRIC: No depression, anxiety, mood swings, or difficulty sleeping  ALLERGY No hives or eczema    PHYSICAL EXAM:  GENERAL: NAD, well-groomed, well-developed  HEAD:  Atraumatic, Normocephalic  EYES: EOMI, PERRLA, conjunctiva and sclera clear  ENMT: No tonsillar erythema, exudates, or enlargement; Moist mucous membranes, Good dentition, No lesions  NECK: Supple, No JVD, Normal thyroid  NERVOUS SYSTEM:  Alert & Oriented X3, Good concentration; Motor Strength 5/5 B/L upper and lower extremities; DTRs 2+ intact and symmetric  CHEST/LUNG: Clear to percussion bilaterally; No rales, rhonchi, wheezing, or rubs  HEART: Regular rate and rhythm; No murmurs, rubs, or gallops  ABDOMEN: Soft, Nontender, Nondistended; Bowel sounds present  EXTREMITIES:  2+ Peripheral Pulses, No clubbing, cyanosis, or edema  LYMPH: No lymphadenopathy noted  SKIN: No rashes or lesions    T(C): 36.1 (03-22-19 @ 05:49), Max: 36.2 (03-21-19 @ 20:56)  HR: 122 (03-22-19 @ 05:49) (63 - 122)  BP: 101/57 (03-22-19 @ 05:49) (101/57 - 160/65)  RR: 18 (03-22-19 @ 05:49) (18 - 18)  SpO2: --  Wt(kg): --  I&O's Summary      MEDICATIONS  (STANDING):  adenosine Injectable (ADENOSCAN) 60 milliGRAM(s) IV Bolus once  dextrose 5%. 1000 milliLiter(s) (50 mL/Hr) IV Continuous <Continuous>  dextrose 50% Injectable 12.5 Gram(s) IV Push once  dextrose 50% Injectable 25 Gram(s) IV Push once  dextrose 50% Injectable 25 Gram(s) IV Push once  diltiazem    milliGRAM(s) Oral daily  docusate sodium 100 milliGRAM(s) Oral three times a day  lactated ringers. 1000 milliLiter(s) (100 mL/Hr) IV Continuous <Continuous>  metoprolol tartrate 50 milliGRAM(s) Oral two times a day  pantoprazole    Tablet 40 milliGRAM(s) Oral before breakfast  rivaroxaban 20 milliGRAM(s) Oral every 24 hours  simvastatin 40 milliGRAM(s) Oral at bedtime  sucralfate 1 Gram(s) Oral four times a day    MEDICATIONS  (PRN):  dextrose 40% Gel 15 Gram(s) Oral once PRN Blood Glucose LESS THAN 70 milliGRAM(s)/deciliter  glucagon  Injectable 1 milliGRAM(s) IntraMuscular once PRN Glucose LESS THAN 70 milligrams/deciliter      LABS:                        10.7   7.67  )-----------( 189      ( 22 Mar 2019 06:54 )             33.3     03-22    136  |  101  |  28<H>  ----------------------------<  277<H>  4.8   |  24  |  1.0    Ca    9.1      22 Mar 2019 06:54  Phos  4.1     03-22  Mg     2.3     03-22    TPro  5.5<L>  /  Alb  3.6  /  TBili  0.9  /  DBili  x   /  AST  27  /  ALT  44<H>  /  AlkPhos  109  03-21        CAPILLARY BLOOD GLUCOSE      POCT Blood Glucose.: 213 mg/dL (22 Mar 2019 11:25)  POCT Blood Glucose.: 244 mg/dL (22 Mar 2019 07:48)  POCT Blood Glucose.: 247 mg/dL (21 Mar 2019 22:21)  POCT Blood Glucose.: 220 mg/dL (21 Mar 2019 16:51)              RADIOLOGY & ADDITIONAL TESTS:    Imaging Personally Reviewed:       Advance Directives:      Palliative Care:
Patient is a 89y old  Female who presents with a chief complaint of cardiac arrhythmia in the endoscopy suite (21 Mar 2019 14:30)      HPI:  87 y/o female with pmh of afib on xarelto, HTN, DM, HLD, cholecystectomy is being admitted for Afib with RVR.    Pt presented for an o/p ERCP after her recent dx of Choledocholithiasis in 1/19. In the Endoscopy suite, pt was found to have a HR in 140s, BP stable, but her ERCP was cancelled by Anesthesia. Pt is being admitted for cardiology clearance, stabilization and possible ERCP AM.    Pt denied any acute complaints including CP, SOB. She did not take her meds this morning or last night since she was told not to take anything PO. (19 Mar 2019 09:46)      INTERVAL HPI/OVERNIGHT EVENTS: Pt seen and examined at bedside, in NAD. Denies N/V/D, abdominal pain, CP, SOB, palpitations, fevers.  Pt s/p ercp , papillotomy and CBD stone removal yesterday.  Pt has no abdo complaints.  LFTs today are wnl ( ? NOTHING TO COMPARE THEM WITH)      REVIEW OF SYSTEMS:  CONSTITUTIONAL: No fever, weight loss, or fatigue  EYES: No eye pain, visual disturbances, or discharge  ENMT:  No difficulty hearing, tinnitus, vertigo; No sinus or throat pain  NECK: No pain or stiffness  BREASTS: No pain, no masses,   RESPIRATORY: No cough, wheezing, chills or hemoptysis; No shortness of breath  CARDIOVASCULAR: No chest pain, palpitations, dizziness, or leg swelling  GASTROINTESTINAL: No abdominal or epigastric pain. No nausea, vomiting, or hematemesis; No diarrhea or constipation. No melena or hematochezia.  GENITOURINARY: No dysuria, frequency, hematuria, or incontinence  NEUROLOGICAL: No headaches, memory loss, loss of strength, numbness, or tremors  SKIN: No itching, burning, rashes, or lesions   LYMPH NODES: No enlarged glands  MUSCULOSKELETAL: No joint pain or swelling; No muscle, back, or extremity pain  PSYCHIATRIC: No depression, anxiety, mood swings, or difficulty sleeping  ALLERY No hives or eczema    PHYSICAL EXAM:  GENERAL: NAD, well-groomed, well-developed  HEAD:  Atraumatic, Normocephalic  EYES: EOMI, PERRLA, conjunctiva and sclera clear  ENMT: No tonsillar erythema, exudates, or enlargement; Moist mucous membranes, Good dentition, No lesions  NECK: Supple, No JVD, Normal thyroid  NERVOUS SYSTEM:  Alert & Oriented X3, Good concentration; Motor Strength 5/5 B/L upper and lower extremities; DTRs 2+ intact and symmetric  CHEST/LUNG: Clear to percussion bilaterally; No rales, rhonchi, wheezing, or rubs  HEART: Regular rate and rhythm; No murmurs, rubs, or gallops  ABDOMEN: Soft, Nontender, Nondistended; Bowel sounds present  EXTREMITIES:  2+ Peripheral Pulses, No clubbing, cyanosis, or edema  LYMPH: No lymphadenopathy noted  SKIN: No rashes or lesions    T(C): 36.1 (03-21-19 @ 13:00), Max: 37 (03-21-19 @ 05:14)  HR: 80 (03-21-19 @ 13:00) (59 - 119)  BP: 160/65 (03-21-19 @ 13:00) (118/85 - 179/102)  RR: 18 (03-21-19 @ 05:14) (18 - 19)  SpO2: 95% (03-20-19 @ 21:13) (95% - 99%)  Wt(kg): --  I&O's Summary    20 Mar 2019 07:01  -  21 Mar 2019 07:00  --------------------------------------------------------  IN: 36 mL / OUT: 300 mL / NET: -264 mL        MEDICATIONS  (STANDING):  adenosine Injectable (ADENOSCAN) 60 milliGRAM(s) IV Bolus once  dextrose 5%. 1000 milliLiter(s) (50 mL/Hr) IV Continuous <Continuous>  dextrose 50% Injectable 12.5 Gram(s) IV Push once  dextrose 50% Injectable 25 Gram(s) IV Push once  dextrose 50% Injectable 25 Gram(s) IV Push once  diltiazem    milliGRAM(s) Oral daily  docusate sodium 100 milliGRAM(s) Oral three times a day  lactated ringers. 1000 milliLiter(s) (100 mL/Hr) IV Continuous <Continuous>  magnesium sulfate  IVPB 2 Gram(s) IV Intermittent once  metoprolol tartrate 50 milliGRAM(s) Oral two times a day  pantoprazole    Tablet 40 milliGRAM(s) Oral before breakfast  rivaroxaban 20 milliGRAM(s) Oral every 24 hours  simvastatin 40 milliGRAM(s) Oral at bedtime  sucralfate 1 Gram(s) Oral four times a day    MEDICATIONS  (PRN):  dextrose 40% Gel 15 Gram(s) Oral once PRN Blood Glucose LESS THAN 70 milliGRAM(s)/deciliter  glucagon  Injectable 1 milliGRAM(s) IntraMuscular once PRN Glucose LESS THAN 70 milligrams/deciliter      LABS:                        12.4   11.32 )-----------( 205      ( 21 Mar 2019 06:51 )             38.4     03-21    136  |  98  |  19  ----------------------------<  258<H>  5.0   |  25  |  0.8    Ca    9.3      21 Mar 2019 06:51  Mg     1.5     03-21    TPro  5.5<L>  /  Alb  3.6  /  TBili  0.9  /  DBili  x   /  AST  27  /  ALT  44<H>  /  AlkPhos  109  03-21    PT/INR - ( 20 Mar 2019 11:38 )   PT: 11.20 sec;   INR: 0.97 ratio         PTT - ( 20 Mar 2019 11:38 )  PTT:54.3 sec    CAPILLARY BLOOD GLUCOSE      POCT Blood Glucose.: 199 mg/dL (21 Mar 2019 11:35)  POCT Blood Glucose.: 216 mg/dL (21 Mar 2019 07:50)  POCT Blood Glucose.: 250 mg/dL (20 Mar 2019 21:44)              RADIOLOGY & ADDITIONAL TESTS:    Imaging Personally Reviewed:       Advance Directives:      Palliative Care:
Progress Note:   · Provider Specialty	Internal Medicine	    Reason for Admission:    Reason for Admission:  · Reason for Admission	ERCP	    Assessment and Plan:   · Assessment		  ________________________________________________________________________________  DAILY PROGRESS NOTE:    ================== SUBJECTIVE ==================    LATRICIA JOSE ARMANDO  /   89y  /  Female  /  MRN#: [id number]  Patient is a 89y old Female who presents with a chief complaint of cardiac arrhythmia (20 Mar 2019 23:07)  Currently admitted to medicine with the primary diagnosis of     HOSPITAL DAY: 2d     OVERNIGHT EVENTS: None    TELEMETRY EVENTS: A fib with RVR    TODAY: Patient was seen this morning at bedside. Currently, the patient reports no complaints     Review of systems is otherwise negative.    =================== OBJECTIVE ===================    VITAL SIGNS: Last 24 Hours  T(C): 37 (21 Mar 2019 05:14), Max: 37 (21 Mar 2019 05:14)  T(F): 98.6 (21 Mar 2019 05:14), Max: 98.6 (21 Mar 2019 05:14)  HR: 105 (21 Mar 2019 05:14) (59 - 119)  BP: 124/66 (21 Mar 2019 05:14) (118/85 - 179/102)  BP(mean): --  RR: 18 (21 Mar 2019 05:14) (18 - 19)  SpO2: 95% (20 Mar 2019 21:13) (95% - 99%)    03-20-19 @ 07:01  -  03-21-19 @ 07:00  --------------------------------------------------------  IN: 36 mL / OUT: 300 mL / NET: -264 mL    PHYSICAL EXAM:  GENERAL: NAD, well-developed  CHEST/LUNG: Clear to auscultation bilaterally; No wheeze  HEART: Regular rate and rhythm; No murmurs, rubs, or gallops  ABDOMEN: Soft, Nontender, Nondistended; Bowel sounds present  EXTREMITIES:  2+ Peripheral Pulses, No clubbing, cyanosis, or edema  PSYCH: AAOx3  NEUROLOGY: non-focal  General:  Appearance is consistent with chronologic age.  No abnormal facies.   General: AA&Ox3.  Fund of knowledge is intact and normal.  Language with normal repetition, comprehension and naming.  Nondysarthric.    SKIN: No rashes or lesions    ===================== LABS =====================                        12.4   11.32 )-----------( 205      ( 21 Mar 2019 06:51 )             38.4     03-21    136  |  98  |  19  ----------------------------<  258<H>  5.0   |  25  |  0.8    Ca    9.3      21 Mar 2019 06:51  Mg     1.5     03-21    TPro  5.5<L>  /  Alb  3.6  /  TBili  0.9  /  DBili  x   /  AST  27  /  ALT  44<H>  /  AlkPhos  109  03-21    PT/INR - ( 20 Mar 2019 11:38 )   PT: 11.20 sec;   INR: 0.97 ratio         PTT - ( 20 Mar 2019 11:38 )  PTT:54.3 sec    ================== OTHER SIGNIFICANT FINDINGS ==================    < from: ERCP (03.20.19 @ 16:30) >  Summary:   ERCP performed with sphincterotomy, a stone was removed using stone extraction  balloon resulting in excellent biliary drainage.     < end of copied text >    ================== ALLERGIES ===================  No Known Drug Allergies  Originally Entered as [RASH] reaction to [cashew nut] (Unknown)    ==================== MEDS =====================  adenosine Injectable (ADENOSCAN) 60 milliGRAM(s) IV Bolus once  dextrose 5%. 1000 milliLiter(s) IV Continuous <Continuous>  dextrose 50% Injectable 12.5 Gram(s) IV Push once  dextrose 50% Injectable 25 Gram(s) IV Push once  dextrose 50% Injectable 25 Gram(s) IV Push once  diltiazem    milliGRAM(s) Oral daily  docusate sodium 100 milliGRAM(s) Oral three times a day  lactated ringers. 1000 milliLiter(s) IV Continuous <Continuous>  magnesium sulfate  IVPB 2 Gram(s) IV Intermittent once  magnesium sulfate  IVPB 2 Gram(s) IV Intermittent once  metoprolol tartrate 50 milliGRAM(s) Oral two times a day  pantoprazole    Tablet 40 milliGRAM(s) Oral before breakfast  simvastatin 40 milliGRAM(s) Oral at bedtime  sucralfate 1 Gram(s) Oral four times a day    PRN MEDICATIONS  dextrose 40% Gel 15 Gram(s) Oral once PRN  glucagon  Injectable 1 milliGRAM(s) IntraMuscular once PRN
Progress Note:   · Provider Specialty	Internal Medicine	    Reason for Admission:   Reason for Admission:  · Reason for Admission	 elective ERCP, found to have afib/flutter with RVR	    Assessment and Plan:   · Assessment		  ________________________________________________________________________________  DAILY PROGRESS NOTE:    ================== SUBJECTIVE ==================    LATRICIA JOSE ARMANDO  /   89y  /  Female  /  MRN#: [id number]  Patient is a 89y old Female who presents with a chief complaint of elective ERCP. foudn to be in afib/flutter with RVR.   Currently admitted to medicine with the primary diagnosis of a flutter with RVR.    HOSPITAL DAY: 3d    OVERNIGHT EVENTS: None    TELEMETRY EVENTS: A fib with RVR rate is well controlled.     TODAY: Patient was seen this morning at bedside. Currently, the patient reports no complaints     Review of systems is otherwise negative.    =================== OBJECTIVE ===================    VITAL SIGNS: Last 24 Hours  Vital Signs Last 24 Hrs  T(C): 36.1 (22 Mar 2019 05:49), Max: 36.2 (21 Mar 2019 20:56)  T(F): 97 (22 Mar 2019 05:49), Max: 97.2 (21 Mar 2019 20:56)  HR: 122 (22 Mar 2019 05:49) (63 - 122)  BP: 101/57 (22 Mar 2019 05:49) (101/57 - 160/65)  BP(mean): --  RR: 18 (22 Mar 2019 05:49) (18 - 18)  SpO2: --    PHYSICAL EXAM:  GENERAL: NAD, well-developed  CHEST/LUNG: Clear to auscultation bilaterally; No wheeze  HEART: Regular rate and rhythm; No murmurs, rubs, or gallops  ABDOMEN: Soft, Nontender, Nondistended; Bowel sounds present  EXTREMITIES:  2+ Peripheral Pulses, No clubbing, cyanosis, or edema  PSYCH: AAOx3  NEUROLOGY: non-focal  General:  Appearance is consistent with chronologic age.  No abnormal facies.   General: AA&Ox3.  Fund of knowledge is intact and normal.  Language with normal repetition, comprehension and naming.  Nondysarthric.    SKIN: No rashes or lesions    ===================== LABS =====================                                  10.7   7.67  )-----------( 189      ( 22 Mar 2019 06:54 )             33.3   03-22    136  |  101  |  28<H>  ----------------------------<  277<H>  4.8   |  24  |  1.0    Ca    9.1      22 Mar 2019 06:54  Phos  4.1     03-22  Mg     2.3     03-22    TPro  5.5<L>  /  Alb  3.6  /  TBili  0.9  /  DBili  x   /  AST  27  /  ALT  44<H>  /  AlkPhos  109  03-21    PT/INR - ( 20 Mar 2019 11:38 )   PT: 11.20 sec;   INR: 0.97 ratio        PTT - ( 20 Mar 2019 11:38 )  PTT:54.3 sec    ================== OTHER SIGNIFICANT FINDINGS ==================    < from: ERCP (03.20.19 @ 16:30) >  Summary:   ERCP performed with sphincterotomy, a stone was removed using stone extraction  balloon resulting in excellent biliary drainage.   < end of copied text >    ================== ALLERGIES ===================  No Known Drug Allergies  Originally Entered as [RASH] reaction to [cashew nut] (Unknown)    ==================== MEDS =====================  MEDICATIONS  (STANDING):  adenosine Injectable (ADENOSCAN) 60 milliGRAM(s) IV Bolus once  dextrose 5%. 1000 milliLiter(s) (50 mL/Hr) IV Continuous <Continuous>  dextrose 50% Injectable 12.5 Gram(s) IV Push once  dextrose 50% Injectable 25 Gram(s) IV Push once  dextrose 50% Injectable 25 Gram(s) IV Push once  diltiazem    milliGRAM(s) Oral daily  docusate sodium 100 milliGRAM(s) Oral three times a day  lactated ringers. 1000 milliLiter(s) (100 mL/Hr) IV Continuous <Continuous>  metoprolol tartrate 50 milliGRAM(s) Oral two times a day  pantoprazole    Tablet 40 milliGRAM(s) Oral before breakfast  rivaroxaban 20 milliGRAM(s) Oral every 24 hours  simvastatin 40 milliGRAM(s) Oral at bedtime  sucralfate 1 Gram(s) Oral four times a day    MEDICATIONS  (PRN):  dextrose 40% Gel 15 Gram(s) Oral once PRN Blood Glucose LESS THAN 70 milliGRAM(s)/deciliter  glucagon  Injectable 1 milliGRAM(s) IntraMuscular once PRN Glucose LESS THAN 70 milligrams/deciliter
SUBJ:No chest pain or shortness of breath      MEDICATIONS  (STANDING):  adenosine Injectable (ADENOSCAN) 60 milliGRAM(s) IV Bolus once  dextrose 5%. 1000 milliLiter(s) (50 mL/Hr) IV Continuous <Continuous>  dextrose 50% Injectable 12.5 Gram(s) IV Push once  dextrose 50% Injectable 25 Gram(s) IV Push once  dextrose 50% Injectable 25 Gram(s) IV Push once  diltiazem    milliGRAM(s) Oral daily  docusate sodium 100 milliGRAM(s) Oral three times a day  lactated ringers. 1000 milliLiter(s) (100 mL/Hr) IV Continuous <Continuous>  magnesium sulfate  IVPB 2 Gram(s) IV Intermittent once  magnesium sulfate  IVPB 2 Gram(s) IV Intermittent once  metoprolol tartrate 50 milliGRAM(s) Oral two times a day  pantoprazole    Tablet 40 milliGRAM(s) Oral before breakfast  rivaroxaban 20 milliGRAM(s) Oral every 24 hours  simvastatin 40 milliGRAM(s) Oral at bedtime  sucralfate 1 Gram(s) Oral four times a day    MEDICATIONS  (PRN):  dextrose 40% Gel 15 Gram(s) Oral once PRN Blood Glucose LESS THAN 70 milliGRAM(s)/deciliter  glucagon  Injectable 1 milliGRAM(s) IntraMuscular once PRN Glucose LESS THAN 70 milligrams/deciliter            Vital Signs Last 24 Hrs  T(C): 37 (21 Mar 2019 05:14), Max: 37 (21 Mar 2019 05:14)  T(F): 98.6 (21 Mar 2019 05:14), Max: 98.6 (21 Mar 2019 05:14)  HR: 105 (21 Mar 2019 05:14) (59 - 119)  BP: 124/66 (21 Mar 2019 05:14) (118/85 - 179/102)  BP(mean): --  RR: 18 (21 Mar 2019 05:14) (18 - 19)  SpO2: 95% (20 Mar 2019 21:13) (95% - 99%)      ECG:NML    TTE:    LABS:                        12.4   11.32 )-----------( 205      ( 21 Mar 2019 06:51 )             38.4     03-21    136  |  98  |  19  ----------------------------<  258<H>  5.0   |  25  |  0.8    Ca    9.3      21 Mar 2019 06:51  Mg     1.5     03-21    TPro  5.5<L>  /  Alb  3.6  /  TBili  0.9  /  DBili  x   /  AST  27  /  ALT  44<H>  /  AlkPhos  109  03-21        PT/INR - ( 20 Mar 2019 11:38 )   PT: 11.20 sec;   INR: 0.97 ratio         PTT - ( 20 Mar 2019 11:38 )  PTT:54.3 sec    I&O's Summary    20 Mar 2019 07:01  -  21 Mar 2019 07:00  --------------------------------------------------------  IN: 36 mL / OUT: 300 mL / NET: -264 mL      BNP
SUBJ:No chest pain or shortness of breath      MEDICATIONS  (STANDING):  dextrose 5%. 1000 milliLiter(s) (50 mL/Hr) IV Continuous <Continuous>  dextrose 5%. 1000 milliLiter(s) (50 mL/Hr) IV Continuous <Continuous>  dextrose 50% Injectable 12.5 Gram(s) IV Push once  dextrose 50% Injectable 25 Gram(s) IV Push once  dextrose 50% Injectable 25 Gram(s) IV Push once  dextrose 50% Injectable 12.5 Gram(s) IV Push once  dextrose 50% Injectable 25 Gram(s) IV Push once  dextrose 50% Injectable 25 Gram(s) IV Push once  diltiazem    milliGRAM(s) Oral daily  docusate sodium 100 milliGRAM(s) Oral three times a day  insulin glargine Injectable (LANTUS) 10 Unit(s) SubCutaneous at bedtime  insulin lispro (HumaLOG) corrective regimen sliding scale   SubCutaneous three times a day before meals  insulin lispro Injectable (HumaLOG) 3 Unit(s) SubCutaneous three times a day before meals  metoprolol tartrate 50 milliGRAM(s) Oral two times a day  pantoprazole    Tablet 40 milliGRAM(s) Oral before breakfast  rivaroxaban 20 milliGRAM(s) Oral every 24 hours  simvastatin 40 milliGRAM(s) Oral at bedtime  sucralfate 1 Gram(s) Oral four times a day    MEDICATIONS  (PRN):  dextrose 40% Gel 15 Gram(s) Oral once PRN Blood Glucose LESS THAN 70 milliGRAM(s)/deciliter  dextrose 40% Gel 15 Gram(s) Oral once PRN Blood Glucose LESS THAN 70 milliGRAM(s)/deciliter  glucagon  Injectable 1 milliGRAM(s) IntraMuscular once PRN Glucose LESS THAN 70 milligrams/deciliter  glucagon  Injectable 1 milliGRAM(s) IntraMuscular once PRN Glucose LESS THAN 70 milligrams/deciliter            Vital Signs Last 24 Hrs  T(C): 36.3 (23 Mar 2019 13:22), Max: 36.3 (23 Mar 2019 13:22)  T(F): 97.3 (23 Mar 2019 13:22), Max: 97.3 (23 Mar 2019 13:22)  HR: 71 (23 Mar 2019 13:22) (71 - 137)  BP: 130/60 (23 Mar 2019 13:22) (101/63 - 137/94)  BP(mean): --  RR: 18 (23 Mar 2019 13:22) (18 - 18)  SpO2: 98% (23 Mar 2019 08:00) (98% - 98%)      ECG:NML    TTE:    LABS:                        10.8   7.61  )-----------( 167      ( 23 Mar 2019 06:53 )             34.1     03-23    138  |  102  |  25<H>  ----------------------------<  218<H>  5.1<H>   |  25  |  0.8    Ca    8.8      23 Mar 2019 06:53  Phos  4.1     03-22  Mg     2.3     03-22    TPro  5.3<L>  /  Alb  3.5  /  TBili  0.5  /  DBili  x   /  AST  15  /  ALT  29  /  AlkPhos  89  03-23    CARDIAC MARKERS ( 22 Mar 2019 01:32 )  x     / <0.01 ng/mL / x     / x     / x      CARDIAC MARKERS ( 21 Mar 2019 18:14 )  x     / <0.01 ng/mL / 26 U/L / x     / 2.4 ng/mL          I&O's Summary    BNP

## 2019-03-23 NOTE — PROGRESS NOTE ADULT - PROVIDER SPECIALTY LIST ADULT
Cardiology
Gastroenterology
Gastroenterology
Internal Medicine

## 2019-03-23 NOTE — PROGRESS NOTE ADULT - REASON FOR ADMISSION
Atrial fibrillation
ERCP
ERCP, Afib with RVR
ERCP, RVR
afib
cardiac arrhythmia in the endoscopy suite
A flutter with RVR
Atrial flutter RVP
afib  with RVR in the endoscopy suite  pre ERCP ( cancelled)
cardiac arrhythmia
cardiac arrhythmia
CBD stone
afib

## 2019-03-23 NOTE — PROGRESS NOTE ADULT - ASSESSMENT
Afib with RVR.    # Afib with RVR   -CHADVASC-5  s/p 1L LR  - Started on Cardizem 300mg and Metoprolol 50mg bid  - Per cardiology: needs a cardiac workup inpatient. follow  NM stress test and TTE.   - 2 sets of cardiac enzymes done and negative.   -   xeralto 20 mg yesterday. Slight drop of Hg from 12.4 to 10.7 and BUN vani to 28 from 19. will monitor     # Possible Choledocholithiasis  - ERCP performed with sphincterotomy, a stone was removed using stone extraction  balloon resulting in excellent biliary drainage.  - Liver enzymes resolved   - restarted on diet  -Cleared by GI and no more workup needed.     # HTN  - c/w metoprolol 50mg daily and cardizem 300 CD    # DM  - will hold home oral mediations  - start insulin lantus & add lispro as needed if FS >180    # DLD  - c/w atorvastatin daily

## 2019-03-23 NOTE — PROGRESS NOTE ADULT - ASSESSMENT
________________________________________________________________________________  DAILY PROGRESS NOTE:    ================== SUBJECTIVE ==================    LATRICIA JOSE ARMANDO  /   89y  /  Female  /  MRN#: [id number]  Patient is a 89y old Female who presents with a chief complaint of afib (23 Mar 2019 06:51)  Currently admitted to medicine with the primary diagnosis of Choledocholithiasis      HOSPITAL DAY: 4d     OVERNIGHT EVENTS: None    TELEMETRY EVENTS: None    TODAY: Patient was seen this morning at bedside. Currently, the patient reports no complaints    Review of systems is otherwise negative.    =================== OBJECTIVE ===================    VITAL SIGNS: Last 24 Hours  T(C): 35.8 (23 Mar 2019 05:47), Max: 35.8 (23 Mar 2019 05:47)  T(F): 96.4 (23 Mar 2019 05:47), Max: 96.4 (23 Mar 2019 05:47)  HR: 90 (23 Mar 2019 05:47) (90 - 137)  BP: 113/59 (23 Mar 2019 05:47) (101/63 - 137/94)  BP(mean): --  RR: 18 (23 Mar 2019 05:47) (18 - 18)  SpO2: --    PHYSICAL EXAM:  GENERAL: NAD, well-developed  CHEST/LUNG: Clear to auscultation bilaterally; No wheeze  HEART: Regular rate and rhythm; No murmurs, rubs, or gallops  ABDOMEN: Soft, Nontender, Nondistended; Bowel sounds present  EXTREMITIES:  2+ Peripheral Pulses, No clubbing, cyanosis, or edema  PSYCH: AAOx3  NEUROLOGY: non-focal  General:  Appearance is consistent with chronologic age.  No abnormal facies.   General: AA&Ox3.  Fund of knowledge is intact and normal.  Language with normal repetition, comprehension and naming.  Nondysarthric.    SKIN: No rashes or lesions    ===================== LABS =====================                        10.8   7.61  )-----------( 167      ( 23 Mar 2019 06:53 )             34.1     03-22    136  |  101  |  28<H>  ----------------------------<  277<H>  4.8   |  24  |  1.0    Ca    9.1      22 Mar 2019 06:54  Phos  4.1     03-22  Mg     2.3     03-22              CARDIAC MARKERS ( 22 Mar 2019 01:32 )  x     / <0.01 ng/mL / x     / x     / x      CARDIAC MARKERS ( 21 Mar 2019 18:14 )  x     / <0.01 ng/mL / 26 U/L / x     / 2.4 ng/mL    ================== OTHER SIGNIFICANT FINDINGS ==================    < from: Transthoracic Echocardiogram (03.22.19 @ 13:33) >  Summary:   1. Left ventricular ejection fraction, by visual estimation, is 55 to   60%.   2. Normal global left ventricular systolic function.   3. Normal left ventricular internal cavity size.   4. Spectral Doppler shows impaired relaxation pattern of left   ventricular myocardial filling (Grade I diastolic dysfunction).   5. Normal left atrial size.   6. Normal right atrial size.   7. There is no evidence of pericardial effusion.   8. Moderate mitral annular calcification.   9. Thickening and calcification of the anterior and posterior mitral   valve leaflets.  10. Trace tricuspid regurgitation.  11. Mild aortic regurgitation.    PHYSICIAN INTERPRETATION:  Left Ventricle: The left ventricular internal cavity size is normal. Left   ventricular wall thickness is normal. Global LV systolic function was   normal. Left ventricular ejection fraction, by visual estimation, is 55   to 60%. Spectral Doppler shows impaired relaxation pattern of left   ventricular myocardial filling (Grade I diastolic dysfunction).  Right Ventricle: The right ventricular size is normal. RV systolic   function is normal.  Left Atrium: Normal left atrial size.  Right Atrium: Normal right atrial size.  Pericardium: There is no evidence of pericardial effusion.  Mitral Valve: Moderate mitral valve regurgitation is seen. Thickening and   calcification of the anterior and posterior mitral valve leaflets. There   is moderate mitral annular calcification.  Tricuspid Valve: Trivial tricuspid regurgitation is visualized. The   tricuspid valve is normal.  Aortic Valve: Mild aortic valve regurgitation is seen. The aortic valve   is trileaflet. No evidence of aortic stenosis.  Pulmonic Valve: No indication of pulmonic valve regurgitation.    < end of copied text >    < from: Stress Test Pharmacologic Report (03.22.19 @ 14:32) >  Diagnois Line Nondiagnostic Adenosine stress test, correlate with MPI     < end of copied text >    < from: NM Nuclear Stress Pharmacologic Multiple (03.22.19 @ 15:43) >  1.   Small reversible defect in the inferolateral wall the left ventricle   consistent with ischemia.  2.  Normal left ventricular wall motion and wall thickening.  3.  Left ventricular ejection fraction of 74% which is within the range   of normal    < end of copied text >      < from: ERCP (03.20.19 @ 16:30) >  Summary:   ERCP performed with sphincterotomy, a stone was removed using stone extraction  balloon resulting in excellent biliary drainage.     < end of copied text >    ================== ALLERGIES ===================  No Known Drug Allergies  Originally Entered as [RASH] reaction to [cashew nut] (Unknown)    ==================== MEDS =====================  dextrose 5%. 1000 milliLiter(s) IV Continuous <Continuous>  dextrose 5%. 1000 milliLiter(s) IV Continuous <Continuous>  dextrose 50% Injectable 12.5 Gram(s) IV Push once  dextrose 50% Injectable 25 Gram(s) IV Push once  dextrose 50% Injectable 25 Gram(s) IV Push once  dextrose 50% Injectable 12.5 Gram(s) IV Push once  dextrose 50% Injectable 25 Gram(s) IV Push once  dextrose 50% Injectable 25 Gram(s) IV Push once  diltiazem    milliGRAM(s) Oral daily  docusate sodium 100 milliGRAM(s) Oral three times a day  insulin glargine Injectable (LANTUS) 10 Unit(s) SubCutaneous at bedtime  insulin lispro (HumaLOG) corrective regimen sliding scale   SubCutaneous three times a day before meals  insulin lispro Injectable (HumaLOG) 3 Unit(s) SubCutaneous three times a day before meals  lactated ringers. 1000 milliLiter(s) IV Continuous <Continuous>  metoprolol tartrate 50 milliGRAM(s) Oral two times a day  pantoprazole    Tablet 40 milliGRAM(s) Oral before breakfast  rivaroxaban 20 milliGRAM(s) Oral every 24 hours  simvastatin 40 milliGRAM(s) Oral at bedtime  sucralfate 1 Gram(s) Oral four times a day    PRN MEDICATIONS  dextrose 40% Gel 15 Gram(s) Oral once PRN  dextrose 40% Gel 15 Gram(s) Oral once PRN  glucagon  Injectable 1 milliGRAM(s) IntraMuscular once PRN  glucagon  Injectable 1 milliGRAM(s) IntraMuscular once PRN      ================= ASSESS/PLAN ==================  Patient is a 89y old Female who presents with a chief complaint of afib  with RVR in the endoscopy suite  pre ERCP ( cancelled) (19 Mar 2019 21:33)  Currently admitted to medicine with the primary diagnosis of     PLAN  89 y/o female with pmh of afib on xarelto, HTN, DM, HLD, cholecystectomy is being admitted for Afib with RVR.    # Afib with RVR likely 2/2 lack of Rate Controlling agents   -CHADVASC-5  s/p 1L LR  - Started on Cardizem 300mg and Metoprolol 50mg bid  - Afib but rate controlled on the monitor for past 48 hours. Patient is controlled on average (60-70)) however has moments when heart rate reaches 100-110.   - Per cardiology: needs a cardiac workup inpatient. 2 sets of cardiac enzymes negative. NM stress test with small reversible defect, ECHO unremarkable   - Restarted Xarelto     # Drop in Hb   Patient dropped from 12 to 10.7 with increased BUN and creatinine   No overt signs of bleeding. Patient had ERCP done 2 days ago with sphincterectomy   - Monitor for now    # Choledocholithiasis  - ERCP performed with sphincterotomy, a stone was removed using stone extraction  balloon resulting in excellent biliary drainage.  - Liver enzymes resolved   - Will restart diet     # HTN  - c/w metoprolol 50mg daily and cardizem 300    # DM  - will hold home oral mediations  - start insulin lantus & add lispro as needed if FS >180    # DLD  - c/w atorvastatin daily    GI PPX: Pantoprazole   DVT PPX: Heparin Gtt    DIET: DASH diet   ACTIVITY:  () Ad Beatrice  /  (X) Advance as Tolerated  /  () Bed Rest  /  () Fall Precaution  /  () Seizure precaution    ================= PRESENT TODAY ==================    1-Desir Catheter: No  Indication:  2-Vascular Access:  [X]Peripheral | Indication:  []Midline | Indication:   []PICC Line | Indication:  []CVC | Indication:  []Arterial Line | Indication:  []Uldall Catheter | Indication:   3-IV Fluids: | Indication:  4-Ventilation: No | Sat:     ================= DISPOSITION ==================    Patient to be discharged when condition(s) optimized.            Discharge to:  (X) Home  /  () SNF  /  () HHA /  () Hospice / () Other             Home services:  () Home health  /  () Rehab  /  () IV Abx  /  () Education  /  () None            Counseled on/Discussed cessation of:  () Risky behaviors  /  () Smoking cessation  /  () Diet  /  () Exercise  /  () Other    Discussed case with medical attending. Patient is cleared for discharge however she has no one to pick her up today and discussed with , home care will be fully set up for her tomorrow 3/23/2019. She will be discharged then     ================= CODE STATUS =================                  (X) FULL CODE     |     () DNR     |     () DNI ________________________________________________________________________________  DAILY PROGRESS NOTE:    ================== SUBJECTIVE ==================    LATRICIA JOSE ARMANDO  /   89y  /  Female  /  MRN#: [id number]  Patient is a 89y old Female who presents with a chief complaint of afib (23 Mar 2019 06:51)  Currently admitted to medicine with the primary diagnosis of Choledocholithiasis      HOSPITAL DAY: 4d     OVERNIGHT EVENTS: None    TELEMETRY EVENTS: None    TODAY: Patient was seen this morning at bedside. Currently, the patient reports no complaints    Review of systems is otherwise negative.    =================== OBJECTIVE ===================    VITAL SIGNS: Last 24 Hours  T(C): 35.8 (23 Mar 2019 05:47), Max: 35.8 (23 Mar 2019 05:47)  T(F): 96.4 (23 Mar 2019 05:47), Max: 96.4 (23 Mar 2019 05:47)  HR: 90 (23 Mar 2019 05:47) (90 - 137)  BP: 113/59 (23 Mar 2019 05:47) (101/63 - 137/94)  BP(mean): --  RR: 18 (23 Mar 2019 05:47) (18 - 18)  SpO2: --    PHYSICAL EXAM:  GENERAL: NAD, well-developed  CHEST/LUNG: Clear to auscultation bilaterally; No wheeze  HEART: Regular rate and rhythm; No murmurs, rubs, or gallops  ABDOMEN: Soft, Nontender, Nondistended; Bowel sounds present  EXTREMITIES:  2+ Peripheral Pulses, No clubbing, cyanosis, or edema  PSYCH: AAOx3  NEUROLOGY: non-focal  General:  Appearance is consistent with chronologic age.  No abnormal facies.   General: AA&Ox3.  Fund of knowledge is intact and normal.  Language with normal repetition, comprehension and naming.  Nondysarthric.    SKIN: No rashes or lesions    ===================== LABS =====================                        10.8   7.61  )-----------( 167      ( 23 Mar 2019 06:53 )             34.1     03-22    136  |  101  |  28<H>  ----------------------------<  277<H>  4.8   |  24  |  1.0    Ca    9.1      22 Mar 2019 06:54  Phos  4.1     03-22  Mg     2.3     03-22    CARDIAC MARKERS ( 22 Mar 2019 01:32 )  x     / <0.01 ng/mL / x     / x     / x      CARDIAC MARKERS ( 21 Mar 2019 18:14 )  x     / <0.01 ng/mL / 26 U/L / x     / 2.4 ng/mL    ================== OTHER SIGNIFICANT FINDINGS ==================    < from: Transthoracic Echocardiogram (03.22.19 @ 13:33) >  Summary:   1. Left ventricular ejection fraction, by visual estimation, is 55 to   60%.   2. Normal global left ventricular systolic function.   3. Normal left ventricular internal cavity size.   4. Spectral Doppler shows impaired relaxation pattern of left   ventricular myocardial filling (Grade I diastolic dysfunction).   5. Normal left atrial size.   6. Normal right atrial size.   7. There is no evidence of pericardial effusion.   8. Moderate mitral annular calcification.   9. Thickening and calcification of the anterior and posterior mitral   valve leaflets.  10. Trace tricuspid regurgitation.  11. Mild aortic regurgitation.    PHYSICIAN INTERPRETATION:  Left Ventricle: The left ventricular internal cavity size is normal. Left   ventricular wall thickness is normal. Global LV systolic function was   normal. Left ventricular ejection fraction, by visual estimation, is 55   to 60%. Spectral Doppler shows impaired relaxation pattern of left   ventricular myocardial filling (Grade I diastolic dysfunction).  Right Ventricle: The right ventricular size is normal. RV systolic   function is normal.  Left Atrium: Normal left atrial size.  Right Atrium: Normal right atrial size.  Pericardium: There is no evidence of pericardial effusion.  Mitral Valve: Moderate mitral valve regurgitation is seen. Thickening and   calcification of the anterior and posterior mitral valve leaflets. There   is moderate mitral annular calcification.  Tricuspid Valve: Trivial tricuspid regurgitation is visualized. The   tricuspid valve is normal.  Aortic Valve: Mild aortic valve regurgitation is seen. The aortic valve   is trileaflet. No evidence of aortic stenosis.  Pulmonic Valve: No indication of pulmonic valve regurgitation.    < end of copied text >    < from: Stress Test Pharmacologic Report (03.22.19 @ 14:32) >  Diagnois Line Nondiagnostic Adenosine stress test, correlate with MPI     < end of copied text >    < from: NM Nuclear Stress Pharmacologic Multiple (03.22.19 @ 15:43) >  1.   Small reversible defect in the inferolateral wall the left ventricle   consistent with ischemia.  2.  Normal left ventricular wall motion and wall thickening.  3.  Left ventricular ejection fraction of 74% which is within the range   of normal    < end of copied text >      < from: ERCP (03.20.19 @ 16:30) >  Summary:   ERCP performed with sphincterotomy, a stone was removed using stone extraction  balloon resulting in excellent biliary drainage.     < end of copied text >    ================== ALLERGIES ===================  No Known Drug Allergies  Originally Entered as [RASH] reaction to [cashew nut] (Unknown)    ==================== MEDS =====================  dextrose 5%. 1000 milliLiter(s) IV Continuous <Continuous>  dextrose 5%. 1000 milliLiter(s) IV Continuous <Continuous>  dextrose 50% Injectable 12.5 Gram(s) IV Push once  dextrose 50% Injectable 25 Gram(s) IV Push once  dextrose 50% Injectable 25 Gram(s) IV Push once  dextrose 50% Injectable 12.5 Gram(s) IV Push once  dextrose 50% Injectable 25 Gram(s) IV Push once  dextrose 50% Injectable 25 Gram(s) IV Push once  diltiazem    milliGRAM(s) Oral daily  docusate sodium 100 milliGRAM(s) Oral three times a day  insulin glargine Injectable (LANTUS) 10 Unit(s) SubCutaneous at bedtime  insulin lispro (HumaLOG) corrective regimen sliding scale   SubCutaneous three times a day before meals  insulin lispro Injectable (HumaLOG) 3 Unit(s) SubCutaneous three times a day before meals  lactated ringers. 1000 milliLiter(s) IV Continuous <Continuous>  metoprolol tartrate 50 milliGRAM(s) Oral two times a day  pantoprazole    Tablet 40 milliGRAM(s) Oral before breakfast  rivaroxaban 20 milliGRAM(s) Oral every 24 hours  simvastatin 40 milliGRAM(s) Oral at bedtime  sucralfate 1 Gram(s) Oral four times a day    PRN MEDICATIONS  dextrose 40% Gel 15 Gram(s) Oral once PRN  dextrose 40% Gel 15 Gram(s) Oral once PRN  glucagon  Injectable 1 milliGRAM(s) IntraMuscular once PRN  glucagon  Injectable 1 milliGRAM(s) IntraMuscular once PRN      ================= ASSESS/PLAN ==================  Patient is a 89y old Female who presents with a chief complaint of afib  with RVR in the endoscopy suite  pre ERCP ( cancelled) (19 Mar 2019 21:33)  Currently admitted to medicine with the primary diagnosis of     PLAN  87 y/o female with pmh of afib on xarelto, HTN, DM, HLD, cholecystectomy is being admitted for Afib with RVR.    # Afib with RVR likely 2/2 lack of Rate Controlling agents   -CHADVASC-5  s/p 1L LR  - Started on Cardizem 300mg and Metoprolol 50mg bid  - Afib but rate controlled on the monitor for past 48 hours. Patient is controlled on average (60-70)) however has moments when heart rate reaches 100-110.   - Per cardiology: needs a cardiac workup inpatient. 2 sets of cardiac enzymes negative. NM stress test with small reversible defect, ECHO unremarkable   - TSH and T4 normal   - Restarted Xarelto     # Drop in Hb   Patient dropped from 12 to 10.7 with increased BUN and creatinine   No overt signs of bleeding. Patient had ERCP done 2 days ago with sphincterectomy   - Monitor for now    # Choledocholithiasis  - ERCP performed with sphincterotomy, a stone was removed using stone extraction  balloon resulting in excellent biliary drainage.  - Liver enzymes resolved   - Will restart diet     # HTN  - c/w metoprolol 50mg daily and cardizem 300    # DM  - will hold home oral mediations  - start insulin lantus & add lispro as needed if FS >180    # DLD  - c/w atorvastatin daily    GI PPX: Pantoprazole   DVT PPX: Heparin Gtt    DIET: DASH diet   ACTIVITY:  () Ad Beatrice  /  (X) Advance as Tolerated  /  () Bed Rest  /  () Fall Precaution  /  () Seizure precaution    ================= PRESENT TODAY ==================    1-Desir Catheter: No  Indication:  2-Vascular Access:  [X]Peripheral | Indication:  []Midline | Indication:   []PICC Line | Indication:  []CVC | Indication:  []Arterial Line | Indication:  []Uldall Catheter | Indication:   3-IV Fluids: | Indication:  4-Ventilation: No | Sat:     ================= DISPOSITION ==================    Patient to be discharged when condition(s) optimized.            Discharge to:  (X) Home  /  () SNF  /  () HHA /  () Hospice / () Other             Home services:  () Home health  /  () Rehab  /  () IV Abx  /  () Education  /  () None            Counseled on/Discussed cessation of:  () Risky behaviors  /  () Smoking cessation  /  () Diet  /  () Exercise  /  () Other    Discussed case with medical attending. Patient is cleared for discharge however she has no one to pick her up today and discussed with , home care will be fully set up for her tomorrow 3/23/2019. She will be discharged then     ================= CODE STATUS =================                  (X) FULL CODE     |     () DNR     |     () DNI

## 2019-03-27 DIAGNOSIS — I48.91 UNSPECIFIED ATRIAL FIBRILLATION: ICD-10-CM

## 2019-03-27 DIAGNOSIS — K57.30 DIVERTICULOSIS OF LARGE INTESTINE WITHOUT PERFORATION OR ABSCESS WITHOUT BLEEDING: ICD-10-CM

## 2019-03-27 DIAGNOSIS — M19.91 PRIMARY OSTEOARTHRITIS, UNSPECIFIED SITE: ICD-10-CM

## 2019-03-27 DIAGNOSIS — I10 ESSENTIAL (PRIMARY) HYPERTENSION: ICD-10-CM

## 2019-03-27 DIAGNOSIS — I25.10 ATHEROSCLEROTIC HEART DISEASE OF NATIVE CORONARY ARTERY WITHOUT ANGINA PECTORIS: ICD-10-CM

## 2019-03-27 DIAGNOSIS — E83.42 HYPOMAGNESEMIA: ICD-10-CM

## 2019-03-27 DIAGNOSIS — Z90.49 ACQUIRED ABSENCE OF OTHER SPECIFIED PARTS OF DIGESTIVE TRACT: ICD-10-CM

## 2019-03-27 DIAGNOSIS — R71.0 PRECIPITOUS DROP IN HEMATOCRIT: ICD-10-CM

## 2019-03-27 DIAGNOSIS — Z79.84 LONG TERM (CURRENT) USE OF ORAL HYPOGLYCEMIC DRUGS: ICD-10-CM

## 2019-03-27 DIAGNOSIS — E78.5 HYPERLIPIDEMIA, UNSPECIFIED: ICD-10-CM

## 2019-03-27 DIAGNOSIS — E11.9 TYPE 2 DIABETES MELLITUS WITHOUT COMPLICATIONS: ICD-10-CM

## 2019-03-27 DIAGNOSIS — K21.9 GASTRO-ESOPHAGEAL REFLUX DISEASE WITHOUT ESOPHAGITIS: ICD-10-CM

## 2019-03-27 DIAGNOSIS — K80.50 CALCULUS OF BILE DUCT WITHOUT CHOLANGITIS OR CHOLECYSTITIS WITHOUT OBSTRUCTION: ICD-10-CM

## 2019-03-27 DIAGNOSIS — I48.92 UNSPECIFIED ATRIAL FLUTTER: ICD-10-CM

## 2019-03-27 DIAGNOSIS — Z79.01 LONG TERM (CURRENT) USE OF ANTICOAGULANTS: ICD-10-CM

## 2019-08-17 ENCOUNTER — EMERGENCY (EMERGENCY)
Facility: HOSPITAL | Age: 84
LOS: 0 days | Discharge: HOME | End: 2019-08-17
Attending: EMERGENCY MEDICINE | Admitting: EMERGENCY MEDICINE
Payer: MEDICARE

## 2019-08-17 VITALS
SYSTOLIC BLOOD PRESSURE: 190 MMHG | HEART RATE: 88 BPM | RESPIRATION RATE: 18 BRPM | OXYGEN SATURATION: 98 % | DIASTOLIC BLOOD PRESSURE: 82 MMHG | HEIGHT: 66 IN | WEIGHT: 149.91 LBS | TEMPERATURE: 98 F

## 2019-08-17 VITALS
TEMPERATURE: 98 F | OXYGEN SATURATION: 98 % | RESPIRATION RATE: 18 BRPM | SYSTOLIC BLOOD PRESSURE: 147 MMHG | DIASTOLIC BLOOD PRESSURE: 67 MMHG | HEART RATE: 65 BPM
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
## 2019-08-17 DIAGNOSIS — S09.90XA UNSPECIFIED INJURY OF HEAD, INITIAL ENCOUNTER: ICD-10-CM
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
## 2019-08-17 DIAGNOSIS — E11.9 TYPE 2 DIABETES MELLITUS WITHOUT COMPLICATIONS: ICD-10-CM

## 2019-08-17 DIAGNOSIS — I10 ESSENTIAL (PRIMARY) HYPERTENSION: ICD-10-CM

## 2019-08-17 DIAGNOSIS — I48.91 UNSPECIFIED ATRIAL FIBRILLATION: ICD-10-CM

## 2019-08-17 DIAGNOSIS — Z79.84 LONG TERM (CURRENT) USE OF ORAL HYPOGLYCEMIC DRUGS: ICD-10-CM

## 2019-08-17 DIAGNOSIS — Z98.890 OTHER SPECIFIED POSTPROCEDURAL STATES: Chronic | ICD-10-CM

## 2019-08-17 DIAGNOSIS — Z91.018 ALLERGY TO OTHER FOODS: ICD-10-CM

## 2019-08-17 DIAGNOSIS — R51 HEADACHE: ICD-10-CM

## 2019-08-17 DIAGNOSIS — Z79.01 LONG TERM (CURRENT) USE OF ANTICOAGULANTS: ICD-10-CM

## 2019-08-17 DIAGNOSIS — S00.03XA CONTUSION OF SCALP, INITIAL ENCOUNTER: ICD-10-CM

## 2019-08-17 LAB
ALBUMIN SERPL ELPH-MCNC: 3.9 G/DL — SIGNIFICANT CHANGE UP (ref 3.5–5.2)
ALP SERPL-CCNC: 123 U/L — HIGH (ref 30–115)
ALT FLD-CCNC: 12 U/L — SIGNIFICANT CHANGE UP (ref 0–41)
ANION GAP SERPL CALC-SCNC: 13 MMOL/L — SIGNIFICANT CHANGE UP (ref 7–14)
APTT BLD: 43.1 SEC — HIGH (ref 27–39.2)
AST SERPL-CCNC: 15 U/L — SIGNIFICANT CHANGE UP (ref 0–41)
BASOPHILS # BLD AUTO: 0.01 K/UL — SIGNIFICANT CHANGE UP (ref 0–0.2)
BASOPHILS NFR BLD AUTO: 0.1 % — SIGNIFICANT CHANGE UP (ref 0–1)
BILIRUB SERPL-MCNC: 0.3 MG/DL — SIGNIFICANT CHANGE UP (ref 0.2–1.2)
BLD GP AB SCN SERPL QL: SIGNIFICANT CHANGE UP
BUN SERPL-MCNC: 23 MG/DL — HIGH (ref 10–20)
CALCIUM SERPL-MCNC: 9.6 MG/DL — SIGNIFICANT CHANGE UP (ref 8.5–10.1)
CHLORIDE SERPL-SCNC: 100 MMOL/L — SIGNIFICANT CHANGE UP (ref 98–110)
CO2 SERPL-SCNC: 25 MMOL/L — SIGNIFICANT CHANGE UP (ref 17–32)
CREAT SERPL-MCNC: 0.9 MG/DL — SIGNIFICANT CHANGE UP (ref 0.7–1.5)
EOSINOPHIL # BLD AUTO: 0.06 K/UL — SIGNIFICANT CHANGE UP (ref 0–0.7)
EOSINOPHIL NFR BLD AUTO: 0.8 % — SIGNIFICANT CHANGE UP (ref 0–8)
GLUCOSE SERPL-MCNC: 120 MG/DL — HIGH (ref 70–99)
HCT VFR BLD CALC: 33.6 % — LOW (ref 37–47)
HGB BLD-MCNC: 10.3 G/DL — LOW (ref 12–16)
IMM GRANULOCYTES NFR BLD AUTO: 0.7 % — HIGH (ref 0.1–0.3)
INR BLD: 1.9 RATIO — HIGH (ref 0.65–1.3)
LYMPHOCYTES # BLD AUTO: 0.71 K/UL — LOW (ref 1.2–3.4)
LYMPHOCYTES # BLD AUTO: 9.3 % — LOW (ref 20.5–51.1)
MCHC RBC-ENTMCNC: 24.8 PG — LOW (ref 27–31)
MCHC RBC-ENTMCNC: 30.7 G/DL — LOW (ref 32–37)
MCV RBC AUTO: 80.8 FL — LOW (ref 81–99)
MONOCYTES # BLD AUTO: 0.79 K/UL — HIGH (ref 0.1–0.6)
MONOCYTES NFR BLD AUTO: 10.4 % — HIGH (ref 1.7–9.3)
NEUTROPHILS # BLD AUTO: 6 K/UL — SIGNIFICANT CHANGE UP (ref 1.4–6.5)
NEUTROPHILS NFR BLD AUTO: 78.7 % — HIGH (ref 42.2–75.2)
NRBC # BLD: 0 /100 WBCS — SIGNIFICANT CHANGE UP (ref 0–0)
PLATELET # BLD AUTO: 263 K/UL — SIGNIFICANT CHANGE UP (ref 130–400)
POTASSIUM SERPL-MCNC: 5 MMOL/L — SIGNIFICANT CHANGE UP (ref 3.5–5)
POTASSIUM SERPL-SCNC: 5 MMOL/L — SIGNIFICANT CHANGE UP (ref 3.5–5)
PROT SERPL-MCNC: 6.3 G/DL — SIGNIFICANT CHANGE UP (ref 6–8)
PROTHROM AB SERPL-ACNC: 21.7 SEC — HIGH (ref 9.95–12.87)
RBC # BLD: 4.16 M/UL — LOW (ref 4.2–5.4)
RBC # FLD: 18.2 % — HIGH (ref 11.5–14.5)
SODIUM SERPL-SCNC: 138 MMOL/L — SIGNIFICANT CHANGE UP (ref 135–146)
WBC # BLD: 7.62 K/UL — SIGNIFICANT CHANGE UP (ref 4.8–10.8)
WBC # FLD AUTO: 7.62 K/UL — SIGNIFICANT CHANGE UP (ref 4.8–10.8)

## 2019-08-17 PROCEDURE — 72170 X-RAY EXAM OF PELVIS: CPT | Mod: 26

## 2019-08-17 PROCEDURE — 99284 EMERGENCY DEPT VISIT MOD MDM: CPT

## 2019-08-17 PROCEDURE — 71045 X-RAY EXAM CHEST 1 VIEW: CPT | Mod: 26

## 2019-08-17 PROCEDURE — 72125 CT NECK SPINE W/O DYE: CPT | Mod: 26

## 2019-08-17 PROCEDURE — 70450 CT HEAD/BRAIN W/O DYE: CPT | Mod: 26

## 2019-08-17 NOTE — ED PROVIDER NOTE - NS ED ROS FT
GEN: (-) fever, (-) chills, (-) malaise  HEENT: (-) vision changes, (+) HA  CV: (-) chest pain, (-) palpitations, (-) edema  PULM: (-) cough, (-) wheezing, (-) dyspnea  GI: (-) abdominal pain,(-) Nausea, (-) Vomiting, (-) Diarrhea, (-) Melena  NEURO: (-) weakness, (-) paresthesias, (-) syncope, (-) lightheadedness, (-) dizziness  : (-) dysuria, (-) frequency, (-) urgency  MS: (-) back pain, (-) joint pain, (-)myalgias, (-) swelling  SKIN: (-) rashes, (-) new lesions  HEME: (+)Hematoma, (-) bleeding, (-) ecchymosis

## 2019-08-17 NOTE — ED PROVIDER NOTE - PHYSICAL EXAMINATION
GEN: Alert & Oriented x 3, No acute distress. Calm, appropriate.  Head and Neck: hematoma noted to posterior parietal.   Eyes: PERRL. EOMI. No conjunctival injection. No scleral icterus.   RESP: Lungs clear to auscult bilat. no wheezes, rhonchi or rales. No retractions. Equal air entry.  CARDIO: regular rate and rhythm, no murmurs, rubs or gallops. Normal S1, S2. radial pulses present bilaterally. distal pedal pulses present.   ABD: Soft, Nondistended.  No rebound tenderness/guarding. No pulsatile mass. No tenderness with palpation x 4 quadrants.  MS: no obvious swelling or deformity. no tenderness with palpation of upper and lower extremities. Full ROM of extremities. no midline tenderness throughout. full ROM of neck.   SKIN: no rashes/lesions, no petechiae, no ecchymosis.  Neuro: A&O x3, CN II- XII intact, strength 5/5 throughout extremities, sensation intact. Speech & mentation intact. No drooping of eye or mouth. Without dysarthria or aphasia. Finger to nose intact. gait coordinated with assistance at baseline.

## 2019-08-17 NOTE — ED PROVIDER NOTE - CARE PLAN
Principal Discharge DX:	Hematoma  Secondary Diagnosis:	Fall  Secondary Diagnosis:	Closed head injury

## 2019-08-17 NOTE — ED PROVIDER NOTE - ATTENDING CONTRIBUTION TO CARE
88 yo F PMHx noted including DM, hip fracture, HTN, a fib on Xarelto presents with family member via EMS from home s/p fall.  Pt got up from hr chair, took a few steps and fell back.  Pt c/o hitting head and has headache, no LOC, fall witnessed by family, no n/v, no change in vision.  no neck or back pain.  ON exam pt in NAD AAO x 3, + swelling to post scalp, no raccoon no suarez, PERRL, EOMI, no midline vertebral tenderness, no bruising, abd soft nt nd, equal AE bilateral, Ext atraumatic, hips non tender, good ROM, equal strength,

## 2019-08-17 NOTE — ED PROVIDER NOTE - NSFOLLOWUPINSTRUCTIONS_ED_ALL_ED_FT
Head Injury, Adult  ImageThere are many types of head injuries. They can be as minor as a bump. Some head injuries can be worse. Worse injuries include:  A strong hit to the head that hurts the brain (concussion).  A bruise of the brain (contusion). This means there is bleeding in the brain that can cause swelling.  A cracked skull (skull fracture).  Bleeding in the brain that gathers, gets thick (makes a clot), and forms a bump (hematoma).  Most problems from a head injury come in the first 24 hours. However, you may still have side effects up to 7–10 days after your injury. It is important to watch your condition for any changes.    Follow these instructions at home:  Activity     Rest as much as possible.  Avoid activities that are hard or tiring.  Make sure you get enough sleep.  Limit activities that need a lot of thought or attention, such as:  Watching TV.  Playing memory games and puzzles.  Job-related work or homework.  Working on the computer, social media, and texting.  Avoid activities that could cause another head injury until your doctor says it is okay. This includes playing sports.  Ask your doctor when it is safe for you to go back to your normal activities, such as work or school. Ask your doctor for a step-by-step plan for slowly going back to your normal activities.  Ask your doctor when you can drive, ride a bicycle, or use heavy machinery. Never do these activities if you are dizzy.  Lifestyle     Do not drink alcohol until your doctor says it is okay.  Avoid drug use.  If it is harder than usual to remember things, write them down.  If you are easily distracted, try to do one thing at a time.  Talk with family members or close friends when making important decisions.  Tell your friends, family, a trusted coworker, and  about your injury, symptoms, and limits (restrictions). Have them watch for any problems that are new or getting worse.  General instructions     Take over-the-counter and prescription medicines only as told by your doctor.  Have someone stay with you for 24 hours after your head injury. This person should watch you for any changes in your symptoms and be ready to get help.  Keep all follow-up visits as told by your doctor. This is important.  How is this prevented?  Work on your balance and strength. This can help you avoid falls.  Wear a seatbelt when you are in a moving vehicle.  Wear a helmet when:  Riding a bicycle.  Skiing.  Doing any other sport or activity that has a risk of injury.  Drink alcohol only in moderation.  Make your home safer by:  Getting rid of clutter from the floors and stairs, like things that can make you trip.  Using grab bars in bathrooms and handrails by stairs.  Placing non-slip mats on floors and in bathtubs.  Putting more light in dim areas.  Get help right away if:  You have:  A very bad (severe) headache that is not helped by medicine.  Trouble walking or weakness in your arms and legs.  Clear or bloody fluid coming from your nose or ears.  Changes in your seeing (vision).  Jerky movements that you cannot control (seizure).  You throw up (vomit).  Your symptoms get worse.  You lose balance.  Your speech is slurred.  You pass out.  You are sleepier and have trouble staying awake.  The black centers of your eyes (pupils) change in size.  These symptoms may be an emergency. Do not wait to see if the symptoms will go away. Get medical help right away. Call your local emergency services (911 in the [place] Do not drive yourself to the hospital.     This information is not intended to replace advice given to you by your health care provider. Make sure you discuss any questions you have with your health care provider.

## 2019-08-17 NOTE — ED ADULT TRIAGE NOTE - CHIEF COMPLAINT QUOTE
pt   got  up  from  chair   and  fell  to   floor   hitting  head   no   LOC   swelling   scalp   on  Xarelto

## 2019-08-17 NOTE — ED PROVIDER NOTE - PROGRESS NOTE DETAILS
pt ambulating with assistance in the ED at baseline. instructed pt on strict return precautions. pt to follow up with PMD. pt and son endorse understanding.

## 2019-08-17 NOTE — ED PROVIDER NOTE - CLINICAL SUMMARY MEDICAL DECISION MAKING FREE TEXT BOX
Results reviewed and d/w patient and family.  Rec ice to area,  Head injury instructions provided and discussed with pt and family.  They are instructed to return if any worsening symptoms, worsening headache, persistent vomiting, any change in behavior or any other concerns.  They will return to ED if needed otherwise will f/u with PMD.  They verbalize understanding.  Pt was able to ambulate with assistance in ED.  Usually uses walker or cane

## 2019-08-17 NOTE — ED PROVIDER NOTE - OBJECTIVE STATEMENT
The pt is a 89y Female with PMH DM HTN, and afib on xarelto is presenting to ED s/p mechanical fall 1hr ago. Pt states she got up from chair and as she was turning with her walker she lost her balance and fell and hit the back of her head. She endorses swelling to the back of head with associated throbbing headache. No aggravating or relieving factors. Pt denies LOC, paresthesias, dizziness, lightheadedness, neck pain, f/c/n/v/d, cp, sob, back pain, extremity pain.

## 2019-10-13 ENCOUNTER — EMERGENCY (EMERGENCY)
Facility: HOSPITAL | Age: 84
LOS: 0 days | Discharge: HOME | End: 2019-10-14
Attending: EMERGENCY MEDICINE | Admitting: EMERGENCY MEDICINE
Payer: MEDICARE

## 2019-10-13 VITALS
OXYGEN SATURATION: 95 % | HEART RATE: 59 BPM | SYSTOLIC BLOOD PRESSURE: 149 MMHG | RESPIRATION RATE: 18 BRPM | DIASTOLIC BLOOD PRESSURE: 67 MMHG | TEMPERATURE: 97 F

## 2019-10-13 VITALS
HEIGHT: 66 IN | TEMPERATURE: 98 F | OXYGEN SATURATION: 96 % | SYSTOLIC BLOOD PRESSURE: 156 MMHG | HEART RATE: 67 BPM | DIASTOLIC BLOOD PRESSURE: 69 MMHG | WEIGHT: 149.91 LBS | RESPIRATION RATE: 20 BRPM
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
## 2019-10-13 DIAGNOSIS — Z98.890 OTHER SPECIFIED POSTPROCEDURAL STATES: Chronic | ICD-10-CM

## 2019-10-13 DIAGNOSIS — Z79.899 OTHER LONG TERM (CURRENT) DRUG THERAPY: ICD-10-CM

## 2019-10-13 DIAGNOSIS — Z79.01 LONG TERM (CURRENT) USE OF ANTICOAGULANTS: ICD-10-CM
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
## 2019-10-13 DIAGNOSIS — I10 ESSENTIAL (PRIMARY) HYPERTENSION: ICD-10-CM
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
## 2019-10-13 DIAGNOSIS — I48.91 UNSPECIFIED ATRIAL FIBRILLATION: ICD-10-CM

## 2019-10-13 DIAGNOSIS — S09.90XA UNSPECIFIED INJURY OF HEAD, INITIAL ENCOUNTER: ICD-10-CM

## 2019-10-13 LAB
ALBUMIN SERPL ELPH-MCNC: 3.9 G/DL — SIGNIFICANT CHANGE UP (ref 3.5–5.2)
ALP SERPL-CCNC: 88 U/L — SIGNIFICANT CHANGE UP (ref 30–115)
ALT FLD-CCNC: 11 U/L — SIGNIFICANT CHANGE UP (ref 0–41)
ANION GAP SERPL CALC-SCNC: 14 MMOL/L — SIGNIFICANT CHANGE UP (ref 7–14)
APTT BLD: 35.8 SEC — SIGNIFICANT CHANGE UP (ref 27–39.2)
AST SERPL-CCNC: 14 U/L — SIGNIFICANT CHANGE UP (ref 0–41)
BASOPHILS # BLD AUTO: 0.01 K/UL — SIGNIFICANT CHANGE UP (ref 0–0.2)
BASOPHILS NFR BLD AUTO: 0.1 % — SIGNIFICANT CHANGE UP (ref 0–1)
BILIRUB SERPL-MCNC: 0.3 MG/DL — SIGNIFICANT CHANGE UP (ref 0.2–1.2)
BUN SERPL-MCNC: 19 MG/DL — SIGNIFICANT CHANGE UP (ref 10–20)
CALCIUM SERPL-MCNC: 9.4 MG/DL — SIGNIFICANT CHANGE UP (ref 8.5–10.1)
CHLORIDE SERPL-SCNC: 99 MMOL/L — SIGNIFICANT CHANGE UP (ref 98–110)
CO2 SERPL-SCNC: 24 MMOL/L — SIGNIFICANT CHANGE UP (ref 17–32)
CREAT SERPL-MCNC: 0.8 MG/DL — SIGNIFICANT CHANGE UP (ref 0.7–1.5)
EOSINOPHIL # BLD AUTO: 0.04 K/UL — SIGNIFICANT CHANGE UP (ref 0–0.7)
EOSINOPHIL NFR BLD AUTO: 0.6 % — SIGNIFICANT CHANGE UP (ref 0–8)
GLUCOSE SERPL-MCNC: 160 MG/DL — HIGH (ref 70–99)
HCT VFR BLD CALC: 32.3 % — LOW (ref 37–47)
HGB BLD-MCNC: 10.1 G/DL — LOW (ref 12–16)
IMM GRANULOCYTES NFR BLD AUTO: 0.4 % — HIGH (ref 0.1–0.3)
INR BLD: 1.49 RATIO — HIGH (ref 0.65–1.3)
LYMPHOCYTES # BLD AUTO: 0.61 K/UL — LOW (ref 1.2–3.4)
LYMPHOCYTES # BLD AUTO: 8.5 % — LOW (ref 20.5–51.1)
MCHC RBC-ENTMCNC: 26.2 PG — LOW (ref 27–31)
MCHC RBC-ENTMCNC: 31.3 G/DL — LOW (ref 32–37)
MCV RBC AUTO: 83.9 FL — SIGNIFICANT CHANGE UP (ref 81–99)
MONOCYTES # BLD AUTO: 0.76 K/UL — HIGH (ref 0.1–0.6)
MONOCYTES NFR BLD AUTO: 10.6 % — HIGH (ref 1.7–9.3)
NEUTROPHILS # BLD AUTO: 5.73 K/UL — SIGNIFICANT CHANGE UP (ref 1.4–6.5)
NEUTROPHILS NFR BLD AUTO: 79.8 % — HIGH (ref 42.2–75.2)
NRBC # BLD: 0 /100 WBCS — SIGNIFICANT CHANGE UP (ref 0–0)
PLATELET # BLD AUTO: 238 K/UL — SIGNIFICANT CHANGE UP (ref 130–400)
POTASSIUM SERPL-MCNC: 4.2 MMOL/L — SIGNIFICANT CHANGE UP (ref 3.5–5)
POTASSIUM SERPL-SCNC: 4.2 MMOL/L — SIGNIFICANT CHANGE UP (ref 3.5–5)
PROT SERPL-MCNC: 6.2 G/DL — SIGNIFICANT CHANGE UP (ref 6–8)
PROTHROM AB SERPL-ACNC: 17.1 SEC — HIGH (ref 9.95–12.87)
RBC # BLD: 3.85 M/UL — LOW (ref 4.2–5.4)
RBC # FLD: 16.8 % — HIGH (ref 11.5–14.5)
SODIUM SERPL-SCNC: 137 MMOL/L — SIGNIFICANT CHANGE UP (ref 135–146)
WBC # BLD: 7.18 K/UL — SIGNIFICANT CHANGE UP (ref 4.8–10.8)
WBC # FLD AUTO: 7.18 K/UL — SIGNIFICANT CHANGE UP (ref 4.8–10.8)

## 2019-10-13 PROCEDURE — 70450 CT HEAD/BRAIN W/O DYE: CPT | Mod: 26

## 2019-10-13 PROCEDURE — 71045 X-RAY EXAM CHEST 1 VIEW: CPT | Mod: 26

## 2019-10-13 PROCEDURE — 72125 CT NECK SPINE W/O DYE: CPT | Mod: 26

## 2019-10-13 PROCEDURE — 72170 X-RAY EXAM OF PELVIS: CPT | Mod: 26

## 2019-10-13 PROCEDURE — 99284 EMERGENCY DEPT VISIT MOD MDM: CPT

## 2019-10-13 NOTE — ED PROVIDER NOTE - CLINICAL SUMMARY MEDICAL DECISION MAKING FREE TEXT BOX
Result reviewed and d/w patient and son.  Copies given.  Sono made aware of possible fistula on  prelim reading.  Pt will follow [facility] ith Dr Rubio. Pt instructed to return if any worsening symptoms or concerns.  They verbalize understanding.

## 2019-10-13 NOTE — ED PROVIDER NOTE - CARE PROVIDERS DIRECT ADDRESSES
[email or web address]

## 2019-10-13 NOTE — ED PROVIDER NOTE - PATIENT PORTAL LINK FT
You can access the FollowMyHealth Patient Portal offered by [facility] by registering at the following website: [email or web address]. By joining [facility]’s FollowMyHealth portal, you will also be able to view your health information using other applications (apps) compatible with our system.

## 2019-10-13 NOTE — ED PROVIDER NOTE - NS ED ROS FT
Constitutional: (-) fever, (-) chills  Eyes: (-) visual changes  ENT: (-) nasal congestions  Cardiovascular: (-) chest pain, (-) syncope  Respiratory: (-) cough, (-) shortness of breath, (-) dyspnea,   Gastrointestinal: (-) vomiting, (-) diarrhea, (-)nausea,  Musculoskeletal: (+) neck pain, (-) back pain, (-) joint pain,  Integumentary: (-) rash, (-) edema, (-) bruises  Neurological: (-) headache, (-) loc, (-) dizziness, (-) tingling, (-)numbness,  Peripheral Vascular: (-) leg swelling  :  (-)dysuria,  (-) hematuria

## 2019-10-13 NOTE — ED PROVIDER NOTE - PHYSICAL EXAMINATION
Physical Exam    Vital Signs: I have reviewed the initial vital signs.  Constitutional: well-nourished, appears stated age, no acute distress  Eyes: Conjunctiva pink, Sclera clear, PERRLA, EOMI.  Cardiovascular: S1 and S2, regular rate, regular rhythm, well-perfused extremities, radial pulses equal and 2+, pedal pulse 2+ and equal.   Respiratory: unlabored respiratory effort, clear to auscultation bilaterally no wheezing, rales and rhonchi  Gastrointestinal: soft, non-tender abdomen, no pulsatile mass, normal bowl sounds  Musculoskeletal: supple neck, no lower extremity edema, no midline tenderness, neck from.   Integumentary: warm, dry, no rash  Neurologic: awake, alert, cranial nerves II-XII grossly intact, extremities’ motor and sensory functions grossly intact

## 2019-10-13 NOTE — ED PROVIDER NOTE - ATTENDING CONTRIBUTION TO CARE
88 yo F PMHx noted presents with c/o fall.  Pt missed the chair, fell back and hit her head.  Also c/o neck pain.  Pt is on Xarelto.  no LOC, no n/v, no chest pain.  On exam pt in NAD AAO x 3, GCS 15, no raccoon no suarez, PERRL, no midline vertebral tenderness, Ext atraumatic, FROM x all ext, abd soft nt nd, hips non tender

## 2019-10-13 NOTE — ED PROVIDER NOTE - OBJECTIVE STATEMENT
89 yo female, pmh of PMH DM HTN, and afib on xarelto is presenting to ED s/p mechanical fall 1hr ago. Pt states she had mechanical fall from sitting in [place]. Pt states missed [place], fell onto ground, hit back of head. C/o neck pain, left sided, no radiation, mild aching, no otc meds. denies fever, chills, cp, sob, ha, visual changes, back pain, le swelling, nv, dizziness.

## 2019-10-13 NOTE — ED ADULT NURSE NOTE - NSIMPLEMENTINTERV_GEN_ALL_ED
Implemented All Fall with Harm Risk Interventions:  [place] to call system. Call bell, personal items and telephone within reach. Instruct patient to call for assistance. Room bathroom lighting operational. Non-slip footwear when patient is off stretcher. Physically safe environment: no spills, clutter or unnecessary equipment. Stretcher in lowest position, wheels locked, appropriate side rails in place. Provide visual cue, wrist band, yellow gown, etc. Monitor gait and stability. Monitor for mental status changes and reorient to person, place, and time. Review medications for side effects contributing to fall risk. Reinforce activity limits and safety measures with patient and family. Provide visual clues: red socks.

## 2019-10-13 NOTE — ED ADULT TRIAGE NOTE - CHIEF COMPLAINT QUOTE
BIBA from home.  Pt used her life alert to notify 911.  She fell out of a chair and hit the back of her head.  Per [employer] pt was on the floor about 1 hour.  She is on blood thinner.

## 2019-12-11 ENCOUNTER — INPATIENT (INPATIENT)
Facility: HOSPITAL | Age: 84
LOS: 6 days | Discharge: NURSING, REHAB OR LONG-TERM CARE | End: 2019-12-18
Attending: INTERNAL MEDICINE | Admitting: INTERNAL MEDICINE
Payer: MEDICARE

## 2019-12-11 VITALS
HEART RATE: 82 BPM | SYSTOLIC BLOOD PRESSURE: 147 MMHG | RESPIRATION RATE: 19 BRPM | TEMPERATURE: 98 F | DIASTOLIC BLOOD PRESSURE: 67 MMHG

## 2019-12-11 DIAGNOSIS — Z98.890 OTHER SPECIFIED POSTPROCEDURAL STATES: Chronic | ICD-10-CM

## 2019-12-11 LAB
ALBUMIN SERPL ELPH-MCNC: 3.5 G/DL — SIGNIFICANT CHANGE UP (ref 3.5–5.2)
ALP SERPL-CCNC: 88 U/L — SIGNIFICANT CHANGE UP (ref 30–115)
ALT FLD-CCNC: 12 U/L — SIGNIFICANT CHANGE UP (ref 0–41)
ANION GAP SERPL CALC-SCNC: 13 MMOL/L — SIGNIFICANT CHANGE UP (ref 7–14)
APTT BLD: 32.1 SEC — SIGNIFICANT CHANGE UP (ref 27–39.2)
AST SERPL-CCNC: 13 U/L — SIGNIFICANT CHANGE UP (ref 0–41)
BASOPHILS # BLD AUTO: 0.01 K/UL — SIGNIFICANT CHANGE UP (ref 0–0.2)
BASOPHILS NFR BLD AUTO: 0.1 % — SIGNIFICANT CHANGE UP (ref 0–1)
BILIRUB SERPL-MCNC: 0.4 MG/DL — SIGNIFICANT CHANGE UP (ref 0.2–1.2)
BLD GP AB SCN SERPL QL: SIGNIFICANT CHANGE UP
BUN SERPL-MCNC: 22 MG/DL — HIGH (ref 10–20)
CALCIUM SERPL-MCNC: 9.1 MG/DL — SIGNIFICANT CHANGE UP (ref 8.5–10.1)
CHLORIDE SERPL-SCNC: 102 MMOL/L — SIGNIFICANT CHANGE UP (ref 98–110)
CO2 SERPL-SCNC: 23 MMOL/L — SIGNIFICANT CHANGE UP (ref 17–32)
CREAT SERPL-MCNC: 0.7 MG/DL — SIGNIFICANT CHANGE UP (ref 0.7–1.5)
EOSINOPHIL # BLD AUTO: 0.03 K/UL — SIGNIFICANT CHANGE UP (ref 0–0.7)
EOSINOPHIL NFR BLD AUTO: 0.4 % — SIGNIFICANT CHANGE UP (ref 0–8)
ETHANOL SERPL-MCNC: <10 MG/DL — SIGNIFICANT CHANGE UP
GLUCOSE BLDC GLUCOMTR-MCNC: 113 MG/DL — HIGH (ref 70–99)
GLUCOSE BLDC GLUCOMTR-MCNC: 132 MG/DL — HIGH (ref 70–99)
GLUCOSE SERPL-MCNC: 279 MG/DL — HIGH (ref 70–99)
HCT VFR BLD CALC: 31.4 % — LOW (ref 37–47)
HGB BLD-MCNC: 9.4 G/DL — LOW (ref 12–16)
IMM GRANULOCYTES NFR BLD AUTO: 0.7 % — HIGH (ref 0.1–0.3)
INR BLD: 1.15 RATIO — SIGNIFICANT CHANGE UP (ref 0.65–1.3)
LACTATE SERPL-SCNC: 1.1 MMOL/L — SIGNIFICANT CHANGE UP (ref 0.7–2)
LIDOCAIN IGE QN: 33 U/L — SIGNIFICANT CHANGE UP (ref 7–60)
LYMPHOCYTES # BLD AUTO: 0.54 K/UL — LOW (ref 1.2–3.4)
LYMPHOCYTES # BLD AUTO: 8.1 % — LOW (ref 20.5–51.1)
MCHC RBC-ENTMCNC: 25.3 PG — LOW (ref 27–31)
MCHC RBC-ENTMCNC: 29.9 G/DL — LOW (ref 32–37)
MCV RBC AUTO: 84.6 FL — SIGNIFICANT CHANGE UP (ref 81–99)
MONOCYTES # BLD AUTO: 0.51 K/UL — SIGNIFICANT CHANGE UP (ref 0.1–0.6)
MONOCYTES NFR BLD AUTO: 7.6 % — SIGNIFICANT CHANGE UP (ref 1.7–9.3)
NEUTROPHILS # BLD AUTO: 5.55 K/UL — SIGNIFICANT CHANGE UP (ref 1.4–6.5)
NEUTROPHILS NFR BLD AUTO: 83.1 % — HIGH (ref 42.2–75.2)
NRBC # BLD: 0 /100 WBCS — SIGNIFICANT CHANGE UP (ref 0–0)
PLATELET # BLD AUTO: 245 K/UL — SIGNIFICANT CHANGE UP (ref 130–400)
POTASSIUM SERPL-MCNC: 4.6 MMOL/L — SIGNIFICANT CHANGE UP (ref 3.5–5)
POTASSIUM SERPL-SCNC: 4.6 MMOL/L — SIGNIFICANT CHANGE UP (ref 3.5–5)
PROT SERPL-MCNC: 5.7 G/DL — LOW (ref 6–8)
PROTHROM AB SERPL-ACNC: 13.2 SEC — HIGH (ref 9.95–12.87)
RBC # BLD: 3.71 M/UL — LOW (ref 4.2–5.4)
RBC # FLD: 17.2 % — HIGH (ref 11.5–14.5)
SODIUM SERPL-SCNC: 138 MMOL/L — SIGNIFICANT CHANGE UP (ref 135–146)
TROPONIN T SERPL-MCNC: <0.01 NG/ML — SIGNIFICANT CHANGE UP
TROPONIN T SERPL-MCNC: <0.01 NG/ML — SIGNIFICANT CHANGE UP
WBC # BLD: 6.69 K/UL — SIGNIFICANT CHANGE UP (ref 4.8–10.8)
WBC # FLD AUTO: 6.69 K/UL — SIGNIFICANT CHANGE UP (ref 4.8–10.8)

## 2019-12-11 PROCEDURE — 72170 X-RAY EXAM OF PELVIS: CPT | Mod: 26

## 2019-12-11 PROCEDURE — 71260 CT THORAX DX C+: CPT | Mod: 26

## 2019-12-11 PROCEDURE — 72125 CT NECK SPINE W/O DYE: CPT | Mod: 26

## 2019-12-11 PROCEDURE — 99291 CRITICAL CARE FIRST HOUR: CPT

## 2019-12-11 PROCEDURE — 99221 1ST HOSP IP/OBS SF/LOW 40: CPT

## 2019-12-11 PROCEDURE — 74177 CT ABD & PELVIS W/CONTRAST: CPT | Mod: 26

## 2019-12-11 PROCEDURE — 70450 CT HEAD/BRAIN W/O DYE: CPT | Mod: 26

## 2019-12-11 PROCEDURE — 93010 ELECTROCARDIOGRAM REPORT: CPT

## 2019-12-11 PROCEDURE — 71045 X-RAY EXAM CHEST 1 VIEW: CPT | Mod: 26

## 2019-12-11 RX ORDER — RIVAROXABAN 15 MG-20MG
20 KIT ORAL DAILY
Refills: 0 | Status: DISCONTINUED | OUTPATIENT
Start: 2019-12-11 | End: 2019-12-12

## 2019-12-11 RX ORDER — DOCUSATE SODIUM 100 MG
100 CAPSULE ORAL THREE TIMES A DAY
Refills: 0 | Status: DISCONTINUED | OUTPATIENT
Start: 2019-12-11 | End: 2019-12-18

## 2019-12-11 RX ORDER — METFORMIN HYDROCHLORIDE 850 MG/1
0 TABLET ORAL
Qty: 0 | Refills: 0 | DISCHARGE

## 2019-12-11 RX ORDER — ACETAMINOPHEN 500 MG
650 TABLET ORAL EVERY 6 HOURS
Refills: 0 | Status: DISCONTINUED | OUTPATIENT
Start: 2019-12-11 | End: 2019-12-18

## 2019-12-11 RX ORDER — PANTOPRAZOLE SODIUM 20 MG/1
40 TABLET, DELAYED RELEASE ORAL
Refills: 0 | Status: DISCONTINUED | OUTPATIENT
Start: 2019-12-11 | End: 2019-12-18

## 2019-12-11 RX ORDER — SIMVASTATIN 20 MG/1
40 TABLET, FILM COATED ORAL AT BEDTIME
Refills: 0 | Status: DISCONTINUED | OUTPATIENT
Start: 2019-12-11 | End: 2019-12-13

## 2019-12-11 RX ORDER — METOPROLOL TARTRATE 50 MG
50 TABLET ORAL
Refills: 0 | Status: DISCONTINUED | OUTPATIENT
Start: 2019-12-11 | End: 2019-12-12

## 2019-12-11 RX ORDER — DILTIAZEM HCL 120 MG
300 CAPSULE, EXT RELEASE 24 HR ORAL DAILY
Refills: 0 | Status: DISCONTINUED | OUTPATIENT
Start: 2019-12-11 | End: 2019-12-12

## 2019-12-11 RX ADMIN — Medication 100 MILLIGRAM(S): at 21:22

## 2019-12-11 RX ADMIN — SIMVASTATIN 40 MILLIGRAM(S): 20 TABLET, FILM COATED ORAL at 21:22

## 2019-12-11 RX ADMIN — Medication 650 MILLIGRAM(S): at 23:03

## 2019-12-11 RX ADMIN — Medication 50 MILLIGRAM(S): at 17:54

## 2019-12-11 RX ADMIN — Medication 650 MILLIGRAM(S): at 23:33

## 2019-12-11 NOTE — H&P ADULT - NSHPLABSRESULTS_GEN_ALL_CORE
RADIOLOGY & ADDITIONAL STUDIES:    CT Head No Cont (12.11.19 @ 10:33) >  IMPRESSION:   1.  No evidence ofacute intracranial pathology. Stable exam since   10/13/2019.  2.  Stable severe chronic microvascular changes and chronic lacunar   infarcts.  3.  Left parietal extracalvarial scalp soft tissue swelling/hematoma. No   evidence of underlying fracture.    CT Cervical Spine No Cont (12.11.19 @ 10:39) >  IMPRESSION:  No evidence of acute cervical spine fracture or subluxation. Stable exam.    CT Chest w/ IV Cont (12.11.19 @ 10:47) >  IMPRESSION:   1.  No CT evidence for acute intrathoracic or abdominopelvic traumatic   injury.  2.  Status post cholecystectomy. Pneumobilia, new from prior CT.   Correlate for history of prior biliary intervention/sphincterotomy.   3.  Additional findings in the body of the report.  4.  Right-sided pulmonary nodules. 6-12 follow-up can be performed as   clinically warranted.    CT Abdomen and Pelvis w/ IV Cont (12.11.19 @ 10:47) >  IMPRESSION:   1.  No CT evidence for acute intrathoracic or abdominopelvic traumatic   injury.  2.  Status post cholecystectomy. Pneumobilia, new from prior CT.   Correlate for history of prior biliary intervention/sphincterotomy.   3.  Additional findings in the body of the report.  4.  Right-sided pulmonary nodules. 6-12 follow-up can be performed as   clinically warranted.

## 2019-12-11 NOTE — ED PROVIDER NOTE - CARE PLAN
Principal Discharge DX:	Falls frequently  Secondary Diagnosis:	Closed head injury  Secondary Diagnosis:	Dizziness

## 2019-12-11 NOTE — ED PROVIDER NOTE - OBJECTIVE STATEMENT
89 year old female, pmh DM, HTN, L Hip replacement, on Xarelto, who presents with headache s/p fall 1 hour PTA. Patient ambulates with walker, was standing in kitchen when she "lost her balance" and fell backward onto her head, fall unwitnessed. As per pt son, patient heard "loud thump" and found mother on floor, A&Ox3, was able to stand up at the time of event. Denies LOC, presyncopal symptoms. In ED, patient denies vision changes, tinnitus, neck pain, chest pain, SOB, abdominal pain, back pain, UE/LE tingling/numbness/weakness. Of note, patient with history of recurrent falls recently.

## 2019-12-11 NOTE — CONSULT NOTE ADULT - SUBJECTIVE AND OBJECTIVE BOX
TRAUMA ACTIVATION LEVEL:  ALERT    MECHANISM OF INJURY:   [X] Fall	    GCS: 15 	E: 4	V: 5	M: 6    HPI:  89yF w/ PMHx of DM, HTN, L Hip replacement, Afib on Xarelto, Patient seen as Trauma Alert s/p fall from standing +HT, -LOC, +AC (Xarelto for Afib) 1 hour PTA. Patient ambulates with walker, was standing in kitchen when she "lost her balance" and fell backward onto her head, fall unwitnessed. As per pt son, patient heard "loud thump" and found mother on floor.     PAST MEDICAL & SURGICAL HISTORY:  Atrial fibrillation  High blood cholesterol  Hypertension  Diabetes mellitus, type 2  History of hip surgery    Allergies  No Known Drug Allergies  Originally Entered as [RASH] reaction to [cashew nut] (Unknown)    Intolerances    Home Medications:  dilTIAZem 300 mg/24 hours oral capsule, extended release: 1 cap(s) orally once a day (19 Mar 2019 09:06)  docusate sodium 100 mg oral capsule: 1 cap(s) orally 3 times a day (19 Mar 2019 09:06)  glimepiride 2 mg oral tablet: 1 tab(s) orally once a day (19 Mar 2019 09:06)  metFORMIN 850 mg oral tablet: orally 2 times a day (19 Mar 2019 09:06)  Metoprolol Tartrate 50 mg oral tablet: 1 tab(s) orally 2 times a day (19 Mar 2019 09:06)  Protonix 40 mg oral delayed release tablet: 1 tab(s) orally once a day (19 Mar 2019 09:06)  rivaroxaban 20 mg oral tablet: 1 tab(s) orally every 24 hours (19 Mar 2019 09:06)  simvastatin 40 mg oral tablet: 1 tab(s) orally once a day (at bedtime) (19 Mar 2019 09:06)    ROS: 10-system review is otherwise negative except HPI above.      Primary Survey:    A - airway intact  B - bilateral breath sounds and good chest rise  C - palpable pulses in all extremities  D - GCS 15 on arrival, RODRIGUES  Exposure obtained    Vital Signs Last 24 Hrs  T(C): 36.8 (11 Dec 2019 10:17), Max: 36.8 (11 Dec 2019 10:12)  T(F): 98.3 (11 Dec 2019 10:17), Max: 98.3 (11 Dec 2019 10:12)  HR: 62 (11 Dec 2019 10:17) (59 - 82)  BP: 134/65 (11 Dec 2019 10:17) (133/57 - 147/67)  BP(mean): --  RR: 18 (11 Dec 2019 10:17) (16 - 19)  SpO2: 98% (11 Dec 2019 10:17) (98% - 98%)    Secondary Survey:   General: NAD  HEENT: Normocephalic, +small posterior scalp hematoma, EOMI, PEERLA. no scalp lacerations   Neck: Soft, midline trachea. no c-spine tenderness  Chest: No chest wall tenderness, no subcutaneous emphysema   Cardiac: S1, S2,  Respiratory: Bilateral breath sounds, clear and equal bilaterally  Abdomen: Soft, non-distended, non-tender, no rebound.  Groin: Normal appearing, pelvis stable   Ext: Moving all extremities.   Back: No TTP, No palpable runoff/stepoff/deformity    Labs:  CAPILLARY BLOOD GLUCOSE      POCT Blood Glucose.: 120 mg/dL (11 Dec 2019 10:08)               9.4    6.69  )-----------( 245      ( 11 Dec 2019 10:00 )             31.4       Auto Neutrophil %: 83.1 % (12-11-19 @ 10:00)  Auto Immature Granulocyte %: 0.7 % (12-11-19 @ 10:00)    RADIOLOGY & ADDITIONAL STUDIES:  ---------------------------------------------------------------------------------------    ASSESSMENT:  89yF w/ PMHx of DM, HTN, L Hip replacement, Afib on Xarelto, Patient seen as Trauma Alert s/p fall from standing +HT, -LOC, +AC (Xarelto for Afib) 1 hour PTA. Patient ambulates with walker, was standing in kitchen when she "lost her balance" and fell backward onto her head, fall unwitnessed. As per pt sonheard "loud thump" and found mother on floor.    PLAN:    - CXR, Pelvic Xray.  - Trauma labs (CBC, BMP, Coags, T&S, UA,)  - Pan Scan (CT head, C-spine, Chest, Abd/pelvis) TRAUMA ACTIVATION LEVEL:  ALERT    MECHANISM OF INJURY:   [X] Fall	    GCS: 15 	E: 4	V: 5	M: 6    HPI:  89yF w/ PMHx of DM, HTN, L Hip replacement, Afib on Xarelto, Patient seen as Trauma Alert s/p fall from standing +HT, -LOC, +AC (Xarelto for Afib) 1 hour PTA. Patient ambulates with walker, was standing in kitchen when she "lost her balance" and fell backward onto her head, fall unwitnessed. As per pt son, patient heard "loud thump" and found mother on floor.     PAST MEDICAL & SURGICAL HISTORY:  Atrial fibrillation  High blood cholesterol  Hypertension  Diabetes mellitus, type 2  History of hip surgery    Allergies  No Known Drug Allergies  Originally Entered as [RASH] reaction to [cashew nut] (Unknown)    Intolerances    Home Medications:  dilTIAZem 300 mg/24 hours oral capsule, extended release: 1 cap(s) orally once a day (19 Mar 2019 09:06)  docusate sodium 100 mg oral capsule: 1 cap(s) orally 3 times a day (19 Mar 2019 09:06)  glimepiride 2 mg oral tablet: 1 tab(s) orally once a day (19 Mar 2019 09:06)  metFORMIN 850 mg oral tablet: orally 2 times a day (19 Mar 2019 09:06)  Metoprolol Tartrate 50 mg oral tablet: 1 tab(s) orally 2 times a day (19 Mar 2019 09:06)  Protonix 40 mg oral delayed release tablet: 1 tab(s) orally once a day (19 Mar 2019 09:06)  rivaroxaban 20 mg oral tablet: 1 tab(s) orally every 24 hours (19 Mar 2019 09:06)  simvastatin 40 mg oral tablet: 1 tab(s) orally once a day (at bedtime) (19 Mar 2019 09:06)    ROS: 10-system review is otherwise negative except HPI above.      Primary Survey:    A - airway intact  B - bilateral breath sounds and good chest rise  C - palpable pulses in all extremities  D - GCS 15 on arrival, RODRIGUES  Exposure obtained    Vital Signs Last 24 Hrs  T(C): 36.8 (11 Dec 2019 10:17), Max: 36.8 (11 Dec 2019 10:12)  T(F): 98.3 (11 Dec 2019 10:17), Max: 98.3 (11 Dec 2019 10:12)  HR: 62 (11 Dec 2019 10:17) (59 - 82)  BP: 134/65 (11 Dec 2019 10:17) (133/57 - 147/67)  RR: 18 (11 Dec 2019 10:17) (16 - 19)  SpO2: 98% (11 Dec 2019 10:17) (98% - 98%)    Secondary Survey:   General: NAD  HEENT: Normocephalic, +small posterior scalp hematoma, EOMI, PEERLA. no scalp lacerations   Neck: Soft, midline trachea. no c-spine tenderness  Chest: No chest wall tenderness, no subcutaneous emphysema   Cardiac: S1, S2,  Respiratory: Bilateral breath sounds, clear and equal bilaterally  Abdomen: Soft, non-distended, non-tender, no rebound.  Groin: Normal appearing, pelvis stable   Ext: Moving all extremities.   Back: No TTP, No palpable runoff/stepoff/deformity    Labs:  CAPILLARY BLOOD GLUCOSE      POCT Blood Glucose.: 120 mg/dL (11 Dec 2019 10:08)               9.4    6.69  )-----------( 245      ( 11 Dec 2019 10:00 )             31.4       Auto Neutrophil %: 83.1 % (12-11-19 @ 10:00)  Auto Immature Granulocyte %: 0.7 % (12-11-19 @ 10:00)    RADIOLOGY & ADDITIONAL STUDIES:    CT Head No Cont (12.11.19 @ 10:33) >  IMPRESSION:   1.  No evidence ofacute intracranial pathology. Stable exam since   10/13/2019.  2.  Stable severe chronic microvascular changes and chronic lacunar   infarcts.  3.  Left parietal extracalvarial scalp soft tissue swelling/hematoma. No   evidence of underlying fracture.    CT Cervical Spine No Cont (12.11.19 @ 10:39) >  IMPRESSION:  No evidence of acute cervical spine fracture or subluxation. Stable exam.    CT Chest w/ IV Cont (12.11.19 @ 10:47) >  IMPRESSION:   1.  No CT evidence for acute intrathoracic or abdominopelvic traumatic   injury.  2.  Status post cholecystectomy. Pneumobilia, new from prior CT.   Correlate for history of prior biliary intervention/sphincterotomy.   3.  Additional findings in the body of the report.  4.  Right-sided pulmonary nodules. 6-12 follow-up can be performed as   clinically warranted.    CT Abdomen and Pelvis w/ IV Cont (12.11.19 @ 10:47) >  IMPRESSION:   1.  No CT evidence for acute intrathoracic or abdominopelvic traumatic   injury.  2.  Status post cholecystectomy. Pneumobilia, new from prior CT.   Correlate for history of prior biliary intervention/sphincterotomy.   3.  Additional findings in the body of the report.  4.  Right-sided pulmonary nodules. 6-12 follow-up can be performed as   clinically warranted.    ---------------------------------------------------------------------------------------    ASSESSMENT:  89yF w/ PMHx of DM, HTN, L Hip replacement, Afib on Xarelto, Patient seen as Trauma Alert s/p fall from standing +HT, -LOC, +AC (Xarelto for Afib) 1 hour PTA. Patient ambulates with walker, was standing in kitchen when she "lost her balance" and fell backward onto her head, fall unwitnessed. As per pt cliffheard "loud thump" and found mother on floor.    PLAN:   - No acute traumatic injuries, Cleared from trauma. Disposition as per ED.

## 2019-12-11 NOTE — ED PROVIDER NOTE - NS ED ROS FT
CONST: No fever, chills or bodyaches  EYES: No pain, redness, drainage or visual changes.  ENT: No ear pain or discharge, nasal discharge or congestion.   CARD: No chest pain, palpitations  RESP: No SOB, cough  GI: No abdominal pain, N/V/D  MS: No joint pain, back pain or extremity pain/injury  SKIN: No rashes  NEURO: (+) HA, no dizziness, no LOC.  PSYCH: No hallucinations, SI/HI

## 2019-12-11 NOTE — ED PROVIDER NOTE - PROGRESS NOTE DETAILS
I was directly involved in the management of this patient. Case was discussed with PA Fellow Flo Discussed with son, patient has had multiple falls this year, despite a handicap accessible home, and assisted walking devices. Would like a rehab facility. Given multiple falls, and preceding dizziness, will admit for further evaluation. Discussed with Dr. Rubio, accepts admission

## 2019-12-11 NOTE — ED PROVIDER NOTE - PHYSICAL EXAMINATION
CONST: Well appearing in NAD  EYES: PERRL, EOMI, Sclera and conjunctiva clear.   ENT: No nasal discharge.   NECK: Non-tender, no c-spine tenderness.  CARD: Normal S1 S2; Normal rate and rhythm  RESP: Equal BS B/L, No wheezes, rhonchi or rales. No distress  GI: Soft, non-tender, non-distended. No rebound or guarding.  MS: Normal ROM in all extremities. No midline spinal tenderness.  SKIN: Warm, dry, no acute rashes. Good turgor. 1+ pedal edema bilaterally. Hematoma to L occiput  NEURO: A&Ox3, No focal deficits. Strength 5/5 with no sensory deficits.

## 2019-12-11 NOTE — PATIENT PROFILE ADULT - NSTOBACCONEVERSMOKERY/N_GEN_A
Progress Notes by Gertrude Sims DO at 03/22/17 04:37 PM     Author:  Gertrude Sims DO Service:  (none) Author Type:  Physician     Filed:  03/22/17 04:40 PM Encounter Date:  3/22/2017 Status:  Signed     :  Gertrude Sims DO (Physician)            Last visit with GERTRUDE SIMS was on 10/20/2016 at  9:45 AM [facility]  Last visit with [facility] was on 10/20/2016 at  9:45 AM in [facility]  Match done based on reference date of today 3/22/17    SUBJECTIVE  HPI Mihai is 43 year old male who presents w/ c/o[MB1.1T]  nasal congestion[MB1.1M] this has been present for[MB1.1T] 7 days[MB1.1M]. Other symptoms include[MB1.1T] cough described as dry[MB1.1M]. No nausea, vomiting, diarrhea, constipation, urinary difficulties. No numbness and tingling in the extremities or blurred vision. No chest pain or SOB or wheezing. ROS negative otherwise    OTC meds tried:[MB1.1T] [+][MB1.1M]  No past medical history on file.   No past surgical history on file.   Social History     Substance Use Topics     • Smoking status: Never Smoker   • Smokeless tobacco: Never Used   • Alcohol use No      Current Outpatient Prescriptions     Medication  Sig   • EPINEPHrine 0.3 MG/0.3ML SOAJ Inject 1 Device as directed as needed (severe allergic reaction).   • metformin (GLUCOPHAGE) 1000 MG tablet Take 1 Tab by mouth 2 (two) times daily with meals.   • pioglitazone (ACTOS) 30 MG tablet Take 1 Tab by mouth daily.   • metformin (GLUCOPHAGE-XR) 500 MG 24 hr tablet Take 2 Tabs by mouth daily with breakfast.   • cetirizine (ZYRTEC) 10 MG tablet Take 1 Tab by mouth once.        OBJECTIVE  Filed Vitals:     03/22/17 1541   BP: 125/81   Pulse: 80   Resp: 16   Temp: 99.1 °F (37.3 °C)   TempSrc: Tympanic   SpO2: 96%   PainSc:   4 (0-10 Scale)   PainLoc: throat      No acute distress, voice[MB1.1T] clear[MB1.1M]  Ears:[MB1.1T] Normal bilateral ear exam[MB1.1M]  Eyes:[MB1.1T] conjunctivae and sclerae normal, pupils equal, round,  reactive to light and accomodation[MB1.1M]  Nares:[MB1.1T] mucosa erythematous and swollen[MB1.1M]  Throat:  Moist,   Neck:[MB1.1T] no lymphadenopathy or thyromegaly[MB1.1M]  Lungs:[MB1.1T] clear to auscultation[MB1.1M]  Heart:[MB1.1T] regular rate and rhythm[MB1.1M]  Abdomen:[MB1.1T] Soft, non-tender, normal BS, no masses, organomegaly, rebound or guarding.[MB1.1M]  Peripheral pulse was intact, capillary refill was normal, sensory function was intact and no edema  Skin:[MB1.1T] Skin color, texture, turgor normal. No rashes or lesions.[MB1.1M]    ASSESSMENT[MB1.1T]  Sinusitis  Cough[MB1.1M]    PLAN  Underwent discussion with Mihai regarding this clinical situation. symptomatic measures were given and[MB1.1T] meds dsicussed[MB1.1M]  F/U: Mihai will follow up with his primary care physician as needed or as directed but may return to the [facility] if needed. If symptoms become severe may go to the ER.    Electronically Signed by:    Faisal Lott DO , 3/22/2017[MB1.1T]      Revision History        User Key Date/Time User Provider Type Action    > MB1.1 03/22/17 04:40 PM Faisal Lott DO Physician Sign    M - Manual, T - Template             No

## 2019-12-11 NOTE — H&P ADULT - NSHPPHYSICALEXAM_GEN_ALL_CORE
· Physical Examination:   CONST: Well appearing in NAD  EYES: PERRL, EOMI, Sclera and conjunctiva clear.   ENT: No nasal discharge.   NECK: Non-tender, no c-spine tenderness.  CARD: Normal S1 S2; Normal rate and rhythm  RESP: Equal BS B/L, No wheezes, rhonchi or rales. No distress  GI: Soft, non-tender, non-distended. bs +  MS: Normal ROM in all extremities except right leg weakness (chronic)  SKIN: Warm, dry, no acute rashes. 1+ pedal edema bilaterally. Hematoma to L occiput  NEURO: A&Ox3, No focal deficits. Strength 5/5 rle, 3/5 lle with no sensory deficits.

## 2019-12-11 NOTE — H&P ADULT - HISTORY OF PRESENT ILLNESS
Josie Gonzalez is a 89 y/o female w/ a pmh of HTN, afib on xerelto, DMT2, DLD, who presents to the ED with a complaint of a fall in which she hit the back of her head. Patient states she got up out of her chair this morning and was walking with her walker when she lost balance and fell backwards hitting her head. She says she had a headache and bruising in the back of her head but it is now only tender to palpation. Patient says she did not have any dizziness or presyncopal symptoms prior to the fall. patient says she felt dizziness yesterday. patient says she did not have loc prior to or after the fall. She was able to get up after the fall with the help of her son. patient was A&Ox3. Patient says this has happened to her 4 times in the past year and she always falls backward onto her head. Patient denies a history of seizure. Patient denied chest pain, palpitations, sob,n/v, abdominal pain, dysuria, increased urinary frequency, fever, headache, neck pain, vision changes, hearing changes, back pain.    In ED trauma work up ct head showed extracalvarial scalp soft tissue swelling/hematoma, ct cervical spine negative, ct abdomen pelvis/chest negative . afebrile bp 147/67 ekg shows sinus laura and rbbb

## 2019-12-11 NOTE — ED PROVIDER NOTE - ATTENDING CONTRIBUTION TO CARE
89yo F history of HTN DM DL Afib on Xarelto presenting with fall and head injury. Per son, pt has been getting increasingly lightheaded, has fallen multiple times this year. Today he heard a thump and saw patient. +head injury, no LOC. Currently c/o headache, no vision changes, no neck pain/stiffness. No chest pain, shortness of breath, dysuria/hematuria, back pain  Con: Well appearing NAD non toxic.   HEENT: +large occipital hematoma no open wound, bleeding. PERRLA EOMI conjunctiva nml. No nasal discharge. MMM. Airway intact. C-Collar in place. No midline CTL spine ttp.   CV: RRR no MRG +S1S2.   Pulm: CTA b/l. No flail chest. No crepitus  Abd: s NT ND +BS. Pelvis stable.  Chest/Back: No sx trauma throughout- no ecchymoses, abrasions, hematomas, lacerations  Ext: WWP x4, moving all extremities, no edema. 2+ equal pulses throughout.   Neuro: CN2-12 grossly intact no sensory or motor deficits throughout.   Psy: Cooperative, appropriate

## 2019-12-11 NOTE — H&P ADULT - NSHPREVIEWOFSYSTEMS_GEN_ALL_CORE
CONST: No fever, chills   EYES: No visual changes.  ENT: No ear pain or hearing changes  CARD: No chest pain, palpitations  RESP: No SOB, cough  GI: No abdominal pain, N/V/D  MS: No joint pain, back pain or extremity pain/injury  SKIN: No rashes  NEURO: no HA, no dizziness, no LOC.  PSYCH: No hallucinations

## 2019-12-11 NOTE — ED ADULT NURSE NOTE - INTEGUMENTARY WDL
Patient has no objection to blood transfusions. Color consistent with ethnicity/race, warm, dry intact, resilient.

## 2019-12-11 NOTE — ED PROVIDER NOTE - CLINICAL SUMMARY MEDICAL DECISION MAKING FREE TEXT BOX
89yo F history of HTN DM DL Afib on Xarelto presenting with fall and head injury. Per son, pt has been getting increasingly lightheaded, has fallen multiple times this year. Today he heard a thump and saw patient. +head injury, no LOC. Currently c/o headache, no vision changes, no neck pain/stiffness. No chest pain, shortness of breath, dysuria/hematuria, back pain. labs ekg imaging reviewed. dw trauma 89yo F history of HTN DM DL Afib on Xarelto presenting with fall and head injury. Per son, pt has been getting increasingly lightheaded, has fallen multiple times this year. Today he heard a thump and saw patient. +head injury, no LOC. Currently c/o headache, no vision changes, no neck pain/stiffness. No chest pain, shortness of breath, dysuria/hematuria, back pain. labs ekg imaging reviewed. dw trauma. will admit

## 2019-12-11 NOTE — ED ADULT NURSE NOTE - OBJECTIVE STATEMENT
Patient fell from standing at home hitting back of head , Denies LOC, on Xarelto. Currently patient a&Ox 4 able to fallow verbal commands and only c/o pain to back of head.

## 2019-12-11 NOTE — ED ADULT NURSE NOTE - NSIMPLEMENTINTERV_GEN_ALL_ED
Implemented All Fall Risk Interventions:  [place] to call system. Call bell, personal items and telephone within reach. Instruct patient to call for assistance. Room bathroom lighting operational. Non-slip footwear when patient is off stretcher. Physically safe environment: no spills, clutter or unnecessary equipment. Stretcher in lowest position, wheels locked, appropriate side rails in place. Provide visual cue, wrist band, yellow gown, etc. Monitor gait and stability. Monitor for mental status changes and reorient to person, place, and time. Review medications for side effects contributing to fall risk. Reinforce activity limits and safety measures with patient and family.

## 2019-12-11 NOTE — H&P ADULT - ASSESSMENT
Josie Gonzalez is a 88 y/o female w/ a pmh of HTN, afib on xerelto, DMT2, DLD, who presents to the ED with a complaint of a fall in which she hit the back of her head.     #Head Trauma secondary to fall  -ct head-  Left parietal extracalvarial scalp soft tissue swelling/hematoma. No   evidence of underlying fracture.  -ct abdomen pelvis- Status post cholecystectomy. Pneumobilia, new from prior CT.   -ct chest- Right-sided pulmonary nodules. 6-12 follow-up can be performed as   clinically warranted.  -f/u orthostatics  -telemetry  -f/u tsh  -f/u echo  -consider discontinuing xeralto in light of frequent falls and head trauma      Diet-dash  dvt ppx- on xerelto  gi ppx- protonix Josie Gonzalez is a 89 y/o female w/ a pmh of HTN, afib on xerelto, DMT2, DLD, who presents to the ED with a complaint of a fall in which she hit the back of her head.     #Head Trauma secondary to mechanical fall  -ct head-  Left parietal extracalvarial scalp soft tissue swelling/hematoma. No   evidence of underlying fracture.  -ct abdomen pelvis- Status post cholecystectomy. Pneumobilia, new from prior CT.   -ct chest- Right-sided pulmonary nodules. 6-12 follow-up can be performed as   clinically warranted.  -f/u orthostatics  -telemetry  -f/u tsh  -f/u echo  -consider cardio consult to discontinue xeralto in light of frequent falls and head trauma  -f/u physiatry  -f/u pt/rehab      #Afib  -on xerelto  -c/w metoprolol and diltiazem    #HTN  - c/w metoprolol    #T2DM  - initiative sliding scale if needed    Diet-dash  dvt ppx- on xerelto  gi ppx- protonix

## 2019-12-12 LAB
ALBUMIN SERPL ELPH-MCNC: 3 G/DL — LOW (ref 3.5–5.2)
ALP SERPL-CCNC: 72 U/L — SIGNIFICANT CHANGE UP (ref 30–115)
ALT FLD-CCNC: 10 U/L — SIGNIFICANT CHANGE UP (ref 0–41)
ANION GAP SERPL CALC-SCNC: 11 MMOL/L — SIGNIFICANT CHANGE UP (ref 7–14)
ANION GAP SERPL CALC-SCNC: 12 MMOL/L — SIGNIFICANT CHANGE UP (ref 7–14)
APPEARANCE UR: CLEAR — SIGNIFICANT CHANGE UP
AST SERPL-CCNC: 12 U/L — SIGNIFICANT CHANGE UP (ref 0–41)
BILIRUB SERPL-MCNC: 0.3 MG/DL — SIGNIFICANT CHANGE UP (ref 0.2–1.2)
BILIRUB UR-MCNC: NEGATIVE — SIGNIFICANT CHANGE UP
BUN SERPL-MCNC: 18 MG/DL — SIGNIFICANT CHANGE UP (ref 10–20)
BUN SERPL-MCNC: 19 MG/DL — SIGNIFICANT CHANGE UP (ref 10–20)
CALCIUM SERPL-MCNC: 8.3 MG/DL — LOW (ref 8.5–10.1)
CALCIUM SERPL-MCNC: 8.8 MG/DL — SIGNIFICANT CHANGE UP (ref 8.5–10.1)
CHLORIDE SERPL-SCNC: 104 MMOL/L — SIGNIFICANT CHANGE UP (ref 98–110)
CHLORIDE SERPL-SCNC: 104 MMOL/L — SIGNIFICANT CHANGE UP (ref 98–110)
CK MB CFR SERPL CALC: 2.5 NG/ML — SIGNIFICANT CHANGE UP (ref 0.6–6.3)
CK SERPL-CCNC: 27 U/L — SIGNIFICANT CHANGE UP (ref 0–225)
CO2 SERPL-SCNC: 21 MMOL/L — SIGNIFICANT CHANGE UP (ref 17–32)
CO2 SERPL-SCNC: 26 MMOL/L — SIGNIFICANT CHANGE UP (ref 17–32)
COLOR SPEC: SIGNIFICANT CHANGE UP
CREAT SERPL-MCNC: 0.8 MG/DL — SIGNIFICANT CHANGE UP (ref 0.7–1.5)
CREAT SERPL-MCNC: 0.8 MG/DL — SIGNIFICANT CHANGE UP (ref 0.7–1.5)
DIFF PNL FLD: NEGATIVE — SIGNIFICANT CHANGE UP
GLUCOSE BLDC GLUCOMTR-MCNC: 184 MG/DL — HIGH (ref 70–99)
GLUCOSE BLDC GLUCOMTR-MCNC: 213 MG/DL — HIGH (ref 70–99)
GLUCOSE BLDC GLUCOMTR-MCNC: 215 MG/DL — HIGH (ref 70–99)
GLUCOSE BLDC GLUCOMTR-MCNC: 313 MG/DL — HIGH (ref 70–99)
GLUCOSE BLDC GLUCOMTR-MCNC: 313 MG/DL — HIGH (ref 70–99)
GLUCOSE BLDC GLUCOMTR-MCNC: 68 MG/DL — LOW (ref 70–99)
GLUCOSE BLDC GLUCOMTR-MCNC: 82 MG/DL — SIGNIFICANT CHANGE UP (ref 70–99)
GLUCOSE SERPL-MCNC: 112 MG/DL — HIGH (ref 70–99)
GLUCOSE SERPL-MCNC: 303 MG/DL — HIGH (ref 70–99)
GLUCOSE UR QL: NEGATIVE — SIGNIFICANT CHANGE UP
HCT VFR BLD CALC: 27.5 % — LOW (ref 37–47)
HCT VFR BLD CALC: 28.6 % — LOW (ref 37–47)
HGB BLD-MCNC: 8.3 G/DL — LOW (ref 12–16)
HGB BLD-MCNC: 8.6 G/DL — LOW (ref 12–16)
INR BLD: 1.01 RATIO — SIGNIFICANT CHANGE UP (ref 0.65–1.3)
KETONES UR-MCNC: NEGATIVE — SIGNIFICANT CHANGE UP
LACTATE SERPL-SCNC: 1.9 MMOL/L — SIGNIFICANT CHANGE UP (ref 0.7–2)
LEUKOCYTE ESTERASE UR-ACNC: ABNORMAL
MAGNESIUM SERPL-MCNC: 1.7 MG/DL — LOW (ref 1.8–2.4)
MCHC RBC-ENTMCNC: 25.2 PG — LOW (ref 27–31)
MCHC RBC-ENTMCNC: 25.3 PG — LOW (ref 27–31)
MCHC RBC-ENTMCNC: 30.1 G/DL — LOW (ref 32–37)
MCHC RBC-ENTMCNC: 30.2 G/DL — LOW (ref 32–37)
MCV RBC AUTO: 83.8 FL — SIGNIFICANT CHANGE UP (ref 81–99)
MCV RBC AUTO: 83.9 FL — SIGNIFICANT CHANGE UP (ref 81–99)
NITRITE UR-MCNC: NEGATIVE — SIGNIFICANT CHANGE UP
NRBC # BLD: 0 /100 WBCS — SIGNIFICANT CHANGE UP (ref 0–0)
NRBC # BLD: 0 /100 WBCS — SIGNIFICANT CHANGE UP (ref 0–0)
PH UR: 5.5 — SIGNIFICANT CHANGE UP (ref 5–8)
PLATELET # BLD AUTO: 232 K/UL — SIGNIFICANT CHANGE UP (ref 130–400)
PLATELET # BLD AUTO: 253 K/UL — SIGNIFICANT CHANGE UP (ref 130–400)
POTASSIUM SERPL-MCNC: 4.6 MMOL/L — SIGNIFICANT CHANGE UP (ref 3.5–5)
POTASSIUM SERPL-MCNC: 5.1 MMOL/L — HIGH (ref 3.5–5)
POTASSIUM SERPL-SCNC: 4.6 MMOL/L — SIGNIFICANT CHANGE UP (ref 3.5–5)
POTASSIUM SERPL-SCNC: 5.1 MMOL/L — HIGH (ref 3.5–5)
PROT SERPL-MCNC: 4.7 G/DL — LOW (ref 6–8)
PROT UR-MCNC: NEGATIVE — SIGNIFICANT CHANGE UP
PROTHROM AB SERPL-ACNC: 11.6 SEC — SIGNIFICANT CHANGE UP (ref 9.95–12.87)
RBC # BLD: 3.28 M/UL — LOW (ref 4.2–5.4)
RBC # BLD: 3.41 M/UL — LOW (ref 4.2–5.4)
RBC # FLD: 17.2 % — HIGH (ref 11.5–14.5)
RBC # FLD: 17.4 % — HIGH (ref 11.5–14.5)
SODIUM SERPL-SCNC: 137 MMOL/L — SIGNIFICANT CHANGE UP (ref 135–146)
SODIUM SERPL-SCNC: 141 MMOL/L — SIGNIFICANT CHANGE UP (ref 135–146)
SP GR SPEC: 1.02 — SIGNIFICANT CHANGE UP (ref 1.01–1.02)
TROPONIN T SERPL-MCNC: <0.01 NG/ML — SIGNIFICANT CHANGE UP
UROBILINOGEN FLD QL: SIGNIFICANT CHANGE UP
WBC # BLD: 5.49 K/UL — SIGNIFICANT CHANGE UP (ref 4.8–10.8)
WBC # BLD: 6.66 K/UL — SIGNIFICANT CHANGE UP (ref 4.8–10.8)
WBC # FLD AUTO: 5.49 K/UL — SIGNIFICANT CHANGE UP (ref 4.8–10.8)
WBC # FLD AUTO: 6.66 K/UL — SIGNIFICANT CHANGE UP (ref 4.8–10.8)

## 2019-12-12 PROCEDURE — 99221 1ST HOSP IP/OBS SF/LOW 40: CPT | Mod: GC,25

## 2019-12-12 PROCEDURE — 93010 ELECTROCARDIOGRAM REPORT: CPT

## 2019-12-12 PROCEDURE — 71045 X-RAY EXAM CHEST 1 VIEW: CPT | Mod: 26

## 2019-12-12 RX ORDER — SODIUM CHLORIDE 9 MG/ML
500 INJECTION INTRAMUSCULAR; INTRAVENOUS; SUBCUTANEOUS ONCE
Refills: 0 | Status: COMPLETED | OUTPATIENT
Start: 2019-12-12 | End: 2019-12-12

## 2019-12-12 RX ORDER — DEXTROSE 50 % IN WATER 50 %
12.5 SYRINGE (ML) INTRAVENOUS ONCE
Refills: 0 | Status: DISCONTINUED | OUTPATIENT
Start: 2019-12-12 | End: 2019-12-18

## 2019-12-12 RX ORDER — INSULIN LISPRO 100/ML
VIAL (ML) SUBCUTANEOUS
Refills: 0 | Status: DISCONTINUED | OUTPATIENT
Start: 2019-12-12 | End: 2019-12-18

## 2019-12-12 RX ORDER — INSULIN GLARGINE 100 [IU]/ML
10 INJECTION, SOLUTION SUBCUTANEOUS AT BEDTIME
Refills: 0 | Status: DISCONTINUED | OUTPATIENT
Start: 2019-12-12 | End: 2019-12-18

## 2019-12-12 RX ORDER — ATROPINE SULFATE 0.1 MG/ML
0.5 SYRINGE (ML) INJECTION ONCE
Refills: 0 | Status: COMPLETED | OUTPATIENT
Start: 2019-12-12 | End: 2019-12-12

## 2019-12-12 RX ORDER — INSULIN LISPRO 100/ML
6 VIAL (ML) SUBCUTANEOUS ONCE
Refills: 0 | Status: COMPLETED | OUTPATIENT
Start: 2019-12-12 | End: 2019-12-12

## 2019-12-12 RX ORDER — SODIUM CHLORIDE 9 MG/ML
1000 INJECTION, SOLUTION INTRAVENOUS
Refills: 0 | Status: DISCONTINUED | OUTPATIENT
Start: 2019-12-12 | End: 2019-12-18

## 2019-12-12 RX ORDER — CALCIUM GLUCONATE 100 MG/ML
4 VIAL (ML) INTRAVENOUS ONCE
Refills: 0 | Status: COMPLETED | OUTPATIENT
Start: 2019-12-12 | End: 2019-12-12

## 2019-12-12 RX ORDER — MAGNESIUM SULFATE 500 MG/ML
2 VIAL (ML) INJECTION ONCE
Refills: 0 | Status: COMPLETED | OUTPATIENT
Start: 2019-12-12 | End: 2019-12-12

## 2019-12-12 RX ORDER — DEXTROSE 50 % IN WATER 50 %
25 SYRINGE (ML) INTRAVENOUS ONCE
Refills: 0 | Status: DISCONTINUED | OUTPATIENT
Start: 2019-12-12 | End: 2019-12-18

## 2019-12-12 RX ORDER — CHLORHEXIDINE GLUCONATE 213 G/1000ML
1 SOLUTION TOPICAL
Refills: 0 | Status: DISCONTINUED | OUTPATIENT
Start: 2019-12-12 | End: 2019-12-18

## 2019-12-12 RX ORDER — GLUCAGON INJECTION, SOLUTION 0.5 MG/.1ML
3 INJECTION, SOLUTION SUBCUTANEOUS ONCE
Refills: 0 | Status: COMPLETED | OUTPATIENT
Start: 2019-12-12 | End: 2019-12-12

## 2019-12-12 RX ORDER — MAGNESIUM OXIDE 400 MG ORAL TABLET 241.3 MG
400 TABLET ORAL ONCE
Refills: 0 | Status: DISCONTINUED | OUTPATIENT
Start: 2019-12-12 | End: 2019-12-12

## 2019-12-12 RX ORDER — INSULIN LISPRO 100/ML
4 VIAL (ML) SUBCUTANEOUS
Refills: 0 | Status: DISCONTINUED | OUTPATIENT
Start: 2019-12-12 | End: 2019-12-18

## 2019-12-12 RX ORDER — GLUCAGON INJECTION, SOLUTION 0.5 MG/.1ML
1 INJECTION, SOLUTION SUBCUTANEOUS ONCE
Refills: 0 | Status: DISCONTINUED | OUTPATIENT
Start: 2019-12-12 | End: 2019-12-18

## 2019-12-12 RX ORDER — DEXTROSE 50 % IN WATER 50 %
15 SYRINGE (ML) INTRAVENOUS ONCE
Refills: 0 | Status: DISCONTINUED | OUTPATIENT
Start: 2019-12-12 | End: 2019-12-18

## 2019-12-12 RX ORDER — GLUCAGON INJECTION, SOLUTION 0.5 MG/.1ML
2 INJECTION, SOLUTION SUBCUTANEOUS ONCE
Refills: 0 | Status: DISCONTINUED | OUTPATIENT
Start: 2019-12-12 | End: 2019-12-12

## 2019-12-12 RX ADMIN — Medication 500 GRAM(S): at 16:33

## 2019-12-12 RX ADMIN — SIMVASTATIN 40 MILLIGRAM(S): 20 TABLET, FILM COATED ORAL at 22:39

## 2019-12-12 RX ADMIN — Medication 100 MILLIGRAM(S): at 22:38

## 2019-12-12 RX ADMIN — Medication 4 UNIT(S): at 16:58

## 2019-12-12 RX ADMIN — PANTOPRAZOLE SODIUM 40 MILLIGRAM(S): 20 TABLET, DELAYED RELEASE ORAL at 05:43

## 2019-12-12 RX ADMIN — SODIUM CHLORIDE 1000 MILLILITER(S): 9 INJECTION INTRAMUSCULAR; INTRAVENOUS; SUBCUTANEOUS at 12:51

## 2019-12-12 RX ADMIN — Medication 100 MILLIGRAM(S): at 14:30

## 2019-12-12 RX ADMIN — Medication 300 MILLIGRAM(S): at 05:43

## 2019-12-12 RX ADMIN — Medication 0.5 MILLIGRAM(S): at 11:23

## 2019-12-12 RX ADMIN — Medication 0.5 MILLIGRAM(S): at 12:50

## 2019-12-12 RX ADMIN — Medication 50 MILLIGRAM(S): at 05:43

## 2019-12-12 RX ADMIN — Medication 16.67 GRAM(S): at 16:39

## 2019-12-12 RX ADMIN — Medication 6 UNIT(S): at 12:51

## 2019-12-12 RX ADMIN — Medication 100 MILLIGRAM(S): at 05:43

## 2019-12-12 RX ADMIN — GLUCAGON INJECTION, SOLUTION 3 MILLIGRAM(S): 0.5 INJECTION, SOLUTION SUBCUTANEOUS at 12:49

## 2019-12-12 RX ADMIN — Medication 2: at 16:58

## 2019-12-12 NOTE — CHART NOTE - NSCHARTNOTEFT_GEN_A_CORE
Trauma tertiary Survey    Patient seen and examined. Was a rapid response this morning from symptomatic bradycardia, no evidence or complaints of traumatic injury     PHYSICAL EXAM:  Craniofacial: Atraumatic, No deformity  Eyes: Pupil Size equal B/L and RTL. EOMI  Oropharynx: Atraumatic  Neck: Non-tender, No deformity, Trachea midline.  Chest: Equal breath sounds, non-tender, No deformity or crepitus  Heart: RSR, No murmurs, no rubs  Abdomen: Atraumatic, Non-tender, non-distended. No hepatosplenomegaly  Pelvis: Stable, non-tender, no deformity  : Atraumatic, no blood at the meatus or priapism  Back: Spine non-tender, Atraumatic  Extremities: Atraumatic, no deformities, normal pulses, moving all extremities   Neurologic: CN intact, Motor intact throughout. Sensation intact/normal throughout.  Psych: Mood and affect normal. Judgment and insight normal    Alcohol, Blood: <10 mg/dL (12-11-19 @ 10:00)    CAGE SUBSTANCE ABUSE SCREENING TOOL:  1.	Have you ever felt you should cut down on your drinking?   [  ] YES = 1      [  x] NO = 0  2.	Have people annoyed you by criticizing your drinking?    [  ] YES = 1      [ x ] NO = 0  3.	Have you ever felt bad or guilty about your drinking?   [  ] YES = 1      [ x ] NO = 0  4.	Have you ever had a drink first thing in the morning to steady your nerves or to get rid of a hangover (eye-opener)?    [  ] YES = 1      [ x ] NO = 0    Total =     [  ] Score is two or greater which is considered clinically significant, social work consult will be placed.   [ x ] Score is not two or greater which is not considered clinically significant, social work consult not warranted at this time.    Due to [positive CAGE questionaire/positive blood alcohol on admission] patient screened positive for substance abuse. Screening, Brief Intervention and Referral to Treatment (SBIRT) consult was placed.    All images/reports reviewed. No further traumatic work-up warranted.

## 2019-12-12 NOTE — PROGRESS NOTE ADULT - ASSESSMENT
SP FALL at home and trauma to the L posterior head  Bradyarrhythmia, sp RR 11/12  Hx of HTN, ASHD, CAD, afib, Xarelto  Hx of DLD  Hx of DM II  Hx of OA, DDD, DJD, mobility dysfunction, frequent falls    pt was cleared by trauma SVC  Ct of head and C spine and abd and pelvis show no acute pathology, pt has L posterior scalp hematoma and stable lacunar infarcts and white matter changes  pt ids sp RR today SP FALL at home and trauma to the L posterior head  Bradyarrhythmia, sp RR 11/12  Hx of HTN, ASHD, CAD, afib, Xarelto  Hx of DLD  Hx of DM II  Hx of OA, DDD, DJD, mobility dysfunction, frequent falls    pt was cleared by trauma SVC  Ct of head and C spine and abd and pelvis show no acute pathology, pt has L posterior scalp hematoma and stable lacunar infarcts and white matter changes  pt is sp RR today for bradyarrhythmia, being transferred to CCU, will probably need PPM  cardiac meds being adjusted

## 2019-12-12 NOTE — PROVIDER CONTACT NOTE (OTHER) - SITUATION
Pt disoriented to situation at this time trying to get OOB independently, pt states "I don't know where the hell I am". Pt reoriented.

## 2019-12-12 NOTE — CONSULT NOTE ADULT - SUBJECTIVE AND OBJECTIVE BOX
Patient is a 89y old  Female who presents with a chief complaint of Fall (11 Dec 2019 16:16)    HPI:  Josie Gonzalez is a 89 y/o female w/ a pmh of HTN, afib on xerelto, DMT2, DLD, who presents to the ED with a complaint of a fall in which she hit the back of her head. Patient states she got up out of her chair this morning and was walking with her walker when she lost balance and fell backwards hitting her head. She says she had a headache and bruising in the back of her head but it is now only tender to palpation. Patient says she did not have any dizziness or presyncopal symptoms prior to the fall. patient says she felt dizziness yesterday. patient says she did not have loc prior to or after the fall. She was able to get up after the fall with the help of her son. patient was A&Ox3. Patient says this has happened to her 4 times in the past year and she always falls backward onto her head. Patient denies a history of seizure. Patient denied chest pain, palpitations, sob,n/v, abdominal pain, dysuria, increased urinary frequency, fever, headache, neck pain, vision changes, hearing changes, back pain.    In ED trauma work up ct head showed extracalvarial scalp soft tissue swelling/hematoma, ct cervical spine negative, ct abdomen pelvis/chest negative . afebrile bp 147/67 ekg shows sinus laura and rbbb (11 Dec 2019 16:16)      PAST MEDICAL & SURGICAL HISTORY:  Atrial fibrillation  High blood cholesterol  Hypertension  Diabetes mellitus, type 2  History of hip surgery      Hospital Course: Head Trauma secondary to mechanical fall  -ct head-  Left parietal extracalvarial scalp soft tissue swelling/hematoma. No   evidence of underlying fracture.  -ct abdomen pelvis- Status post cholecystectomy. Pneumobilia, new from prior CT.   -ct chest- Right-sided pulmonary nodules. 6-12 follow-up can be performed as   clinically warranted.  -f/u orthostatics  -telemetry  -f/u tsh  -f/u echo  -consider cardio consult to discontinue xeralto in light of frequent falls and head trauma  -f/u physiatry  -f/u pt/rehab      #Afib  -on xerelto  -c/w metoprolol and diltiazem    #HTN  - c/w metoprolol    #T2DM  - initiative sliding scale if needed    TODAY'S SUBJECTIVE & REVIEW OF SYMPTOMS:     Constitutional WNL   Cardio WNL   Resp WNL   GI WNL  Heme WNL  Endo WNL  Skin WNL  MSK WNL  Neuro WNL  Cognitive WNL  Psych WNL      MEDICATIONS  (STANDING):  diltiazem    milliGRAM(s) Oral daily  docusate sodium Oral Tab/Cap - Peds 100 milliGRAM(s) Oral three times a day  metoprolol tartrate 50 milliGRAM(s) Oral two times a day  pantoprazole    Tablet 40 milliGRAM(s) Oral before breakfast  rivaroxaban 20 milliGRAM(s) Oral daily  simvastatin 40 milliGRAM(s) Oral at bedtime    MEDICATIONS  (PRN):  acetaminophen   Tablet .. 650 milliGRAM(s) Oral every 6 hours PRN Mild Pain (1 - 3)      FAMILY HISTORY:  No pertinent family history in first degree relatives      Allergies    No Known Drug Allergies  Originally Entered as [RASH] reaction to [cashew nut] (Unknown)    Intolerances        SOCIAL HISTORY:    [    ] Etoh  [    ] Smoking  [    ] Substance abuse     Home Environment:  [    ] Home Alone  [   x ] Lives with Family  [    ] Home Health Aid    Dwelling:  [    ] Apartment  [  x  ] Private House  [    ] Adult Home  [    ] Skilled Nursing Facility      [    ] Short Term  [    ] Long Term  [   x ] Stairs  +chairlift                         [    ] Elevator     FUNCTIONAL STATUS PTA: (Check all that apply)  Ambulation: [    x ]Independent    [    ] Dependent     [    ] Non-Ambulatory  Assistive Device: [    ] SA Cane  [    ]  Q Cane  [ x   ] Walker  [    ]  Wheelchair  ADL : [  x  ] Independent  [    ]  Dependent       Vital Signs Last 24 Hrs  T(C): 36.1 (12 Dec 2019 07:30), Max: 36.8 (11 Dec 2019 10:55)  T(F): 96.9 (12 Dec 2019 07:30), Max: 98.3 (11 Dec 2019 10:55)  HR: 61 (12 Dec 2019 08:02) (52 - 78)  BP: 180/69 (12 Dec 2019 08:02) (138/63 - 180/69)  BP(mean): --  RR: 16 (12 Dec 2019 07:30) (16 - 18)  SpO2: 98% (11 Dec 2019 10:55) (98% - 98%)      PHYSICAL EXAM: Alert & Oriented X2  GENERAL: NAD, well-groomed, well-developed  HEAD:  Atraumatic, Normocephalic  EYES: EOMI, PERRLA, conjunctiva and sclera clear  NECK: Supple  CHEST/LUNG: Clear bilaterally  HEART: Regular rate and rhythm  ABDOMEN: Soft, Nontender, Nondistended; Bowel sounds present  EXTREMITIES:  no calf tenderness,no edema BLES    NERVOUS SYSTEM:  Cranial Nerves 2-12 intact [ x   ] Abnormal  [    ]  ROM: WFL all extremities [ x   ]  Abnormal [     ]  Motor Strength: WFL all extremities  [    ]  Abnormal [  x  ]3-4/5 BLES  Sensation: intact to light touch [    ] Abnormal [ x   ]diminished position sense BLES  Reflexes: Symmetric [    ]  Abnormal [    ]    FUNCTIONAL STATUS:  Bed Mobility: [   ]  Independent [    ]  Supervision [  x  ]  Needs Assistance [  ]  N/A  Transfers: [    ]  Independent [    ]  Supervision [ x   ]  Needs Assistance [    ]  N/A    Ambulation:  [    ]  Independent [    ]  Supervision [ x   ]  Needs Assistance [    ]  N/A   ADL:  [    ]   Independent [ x   ] Requires Assistance [    ] N/A       LABS:                        8.6    5.49  )-----------( 253      ( 12 Dec 2019 06:02 )             28.6     12-12    141  |  104  |  19  ----------------------------<  112<H>  5.1<H>   |  26  |  0.8    Ca    8.8      12 Dec 2019 06:02    TPro  5.7<L>  /  Alb  3.5  /  TBili  0.4  /  DBili  x   /  AST  13  /  ALT  12  /  AlkPhos  88  12-11    PT/INR - ( 11 Dec 2019 10:00 )   PT: 13.20 sec;   INR: 1.15 ratio         PTT - ( 11 Dec 2019 10:00 )  PTT:32.1 sec      RADIOLOGY & ADDITIONAL STUDIES:

## 2019-12-12 NOTE — CONSULT NOTE ADULT - ASSESSMENT
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 CONTINUE XARELTO WITH HX OF FALLS    Out of Bed to Chair     DVT/Decubiti Prophylaxis    REHAB PLAN:     [   x  ] Bedside P/T 3-5 times a week   [     ] Bedside O/T  2-3 times a week   [     ] No Rehab Therapy Indicated   [     ]  Speech Therapy   Conditioning/ROM                                 ADL  Bed Mobility                                            Conditioning/ROM  Transfers                                                  Bed Mobility  Sitting /Standing Balance                      Transfers                                        Gait Training                                            Sitting/Standing Balance  Stair Training [   ]Applicable                 Home equipment Eval                                                                     Splinting  [   ] Only      GOALS:   ADL   [  x  ]   Independent         Transfers  [ x   ] Independent            Ambulation  [    x ] Independent     [   x  ] With device                            [    ]  CG                                               [    ]  CG                                                    [     ] CG                            [    ] Min A                                          [    ] Min A                                                [     ] Min  A          DISCHARGE PLAN:   [     ]  Good candidate for Intensive Rehabilitation/Hospital based                                             Will tolerate 3hrs Intensive Rehab Daily                                       [      ]  Short Term Rehab in Skilled Nursing Facility                                       [      ]  Home with Outpatient or VN services                                         [     x ]  Possible Candidate for Intensive Hospital based Rehab

## 2019-12-12 NOTE — CONSULT NOTE ADULT - SUBJECTIVE AND OBJECTIVE BOX
Date of Admission: 19    CHIEF COMPLAINT: Patient is a 89y old  Female who presents with a chief complaint of Fall (12 Dec 2019 10:35)      HPI:  Josie Gonzalez is a 88 y/o female w/ a pmh of HTN, afib on xerelto, DMT2, DLD, who presents to the ED with a complaint of a fall in which she hit the back of her head. Patient states she got up out of her chair this morning and was walking with her walker when she lost balance and fell backwards hitting her head. She says she had a headache and bruising in the back of her head but it is now only tender to palpation. Patient says she did not have any dizziness or presyncopal symptoms prior to the fall. patient says she felt dizziness yesterday. patient says she did not have loc prior to or after the fall. She was able to get up after the fall with the help of her son. patient was A&Ox3. Patient says this has happened to her 4 times in the past year and she always falls backward onto her head. Patient denies a history of seizure. Patient denied chest pain, palpitations, sob,n/v, abdominal pain, dysuria, increased urinary frequency, fever, headache, neck pain, vision changes, hearing changes, back pain.    In ED trauma work up ct head showed extracalvarial scalp soft tissue swelling/hematoma, ct cervical spine negative, ct abdomen pelvis/chest negative . afebrile bp 147/67 ekg shows sinus laura and rbbb (11 Dec 2019 16:16)      PAST MEDICAL & SURGICAL HISTORY:  Atrial fibrillation  High blood cholesterol  Hypertension  Diabetes mellitus, type 2  History of hip surgery      FAMILY HISTORY:  No pertinent family history in first degree relatives  [x] no pertinent family history of premature cardiovascular disease in first degree relatives.    SOCIAL HISTORY:    [x] Non-smoker  [x] No alcohol use  [x] No illicit drug use    Allergies    No Known Drug Allergies  Originally Entered as [RASH] reaction to [cashew nut] (Unknown)    Intolerances    	    REVIEW OF SYSTEMS:  CONSTITUTIONAL: No fever, weight loss, or fatigue . (+) Generalized weakness and lethargy.  CARDIOLOGY: No chest pain, dyspnea of exertion, or syncopal episodes.   RESPIRATORY: No shortness of breath, cough, wheezing.   NEUROLOGICAL: No weakness, no focal deficits to report. (+) Lightheadedness.  GI: No BRBPR, no N,V,diarrhea.    PSYCHIATRY: Normal mood and affect.  HEENT: No nasal discharge, no ecchymosis  SKIN: No ecchymosis, no breakdown  MUSCULOSKELETAL: Full range of motion x4.   EXTREM: No leg swelling or erythema.    PHYSICAL EXAM:  T(C): 36.1 (19 @ 07:30), Max: 36.1 (19 @ 07:30)  HR: 61 (19 @ 08:02) (52 - 78)  BP: 180/69 (19 @ 08:02) (138/63 - 180/69)  RR: 16 (19 @ 07:30) (16 - 18)  SpO2: --  Wt(kg): --  I&O's Summary    11 Dec 2019 07:  -  12 Dec 2019 07:00  --------------------------------------------------------  IN: 0 mL / OUT: 700 mL / NET: -700 mL    12 Dec 2019 07:  -  12 Dec 2019 12:29  --------------------------------------------------------  IN: 320 mL / OUT: 0 mL / NET: 320 mL        General Appearance: NAD, normal for age and gender. 	  Neck: Normal JVP, no bruit.   Eyes: No xanthomalasia, Extra Ocular muscles intact.   Cardiovascular: Bradycardia. +S1 S2 No significant murmur.  Respiratory: Decreased breath sounds bilaterally. No wheezes, rales or rhonchi.  Psychiatry: Alert and oriented, Mood & affect appropriate  Gastrointestinal:  Soft, Non-tender, Non-distended  Skin/Integumen: No rashes, No ecchymoses, No cyanosis	  Neurologic: Non-focal deficits.  Musculoskeletal/ extremities: Normal range of motion, No clubbing, cyanosis or edema    LABS:	 	                        8.3    6.66  )-----------( 232      ( 12 Dec 2019 12:00 )             27.5     12    141  |  104  |  19  ----------------------------<  112<H>  5.1<H>   |  26  |  0.8    Ca    8.8      12 Dec 2019 06:02    TPro  5.7<L>  /  Alb  3.5  /  TBili  0.4  /  DBili  x   /  AST  13  /  ALT  12  /  AlkPhos  88  12    CARDIAC MARKERS ( 11 Dec 2019 20:35 )  x     / <0.01 ng/mL / x     / x     / x      CARDIAC MARKERS ( 11 Dec 2019 10:00 )  x     / <0.01 ng/mL / x     / x     / x          PT/INR - ( 12 Dec 2019 12:00 )   PT: 11.60 sec;   INR: 1.01 ratio    PTT - ( 11 Dec 2019 10:00 )  PTT:32.1 sec    TELEMETRY EVENTS: 	    ECst degree AVB with RBBB and LAFB, also noted 3rd AV block 	  RADIOLOGY:   OTHER: 	    PREVIOUS DIAGNOSTIC TESTING:    [x] Echocardiogram: < from: Transthoracic Echocardiogram (19 @ 13:33) >   1. Left ventricular ejection fraction, by visual estimation, is 55 to   60%.   2. Normal global left ventricular systolic function.   3. Normal left ventricular internal cavity size.   4. Spectral Doppler shows impaired relaxation pattern of left   ventricular myocardial filling (Grade I diastolic dysfunction).   5. Normal left atrial size.   6. Normal right atrial size.   7. There is no evidence of pericardial effusion.   8. Moderate mitral annular calcification.   9. Thickening and calcification of the anterior and posterior mitral   valve leaflets.  10. Trace tricuspid regurgitation.  11. Mild aortic regurgitation.    	  Home Medications:  dilTIAZem 300 mg/24 hours oral capsule, extended release: 1 cap(s) orally once a day (11 Dec 2019 16:24)  docusate sodium 100 mg oral capsule: 1 cap(s) orally 3 times a day (11 Dec 2019 16:24)  glimepiride 2 mg oral tablet: 1 tab(s) orally once a day (11 Dec 2019 16:24)  metFORMIN 500 mg oral tablet: 1 tab(s) orally once a day  (11 Dec 2019 16:24)  Metoprolol Tartrate 50 mg oral tablet: 1 tab(s) orally 2 times a day (11 Dec 2019 16:24)  Protonix 40 mg oral delayed release tablet: 1 tab(s) orally once a day (11 Dec 2019 16:24)  rivaroxaban 20 mg oral tablet: 1 tab(s) orally every 24 hours (11 Dec 2019 16:24)  simvastatin 40 mg oral tablet: 1 tab(s) orally once a day (at bedtime) (11 Dec 2019 16:24)    MEDICATIONS  (STANDING):  atropine Injectable 0.5 milliGRAM(s) IV Push once  atropine Injectable 0.5 milliGRAM(s) IntraMuscular once  docusate sodium Oral Tab/Cap - Peds 100 milliGRAM(s) Oral three times a day  glucagon  Injectable 3 milliGRAM(s) IV Push once  insulin lispro Injectable (HumaLOG) 6 Unit(s) SubCutaneous once  pantoprazole    Tablet 40 milliGRAM(s) Oral before breakfast  rivaroxaban 20 milliGRAM(s) Oral daily  simvastatin 40 milliGRAM(s) Oral at bedtime  sodium chloride 0.9% Bolus 500 milliLiter(s) IV Bolus once    MEDICATIONS  (PRN):  acetaminophen   Tablet .. 650 milliGRAM(s) Oral every 6 hours PRN Mild Pain (1 - 3)

## 2019-12-12 NOTE — CONSULT NOTE ADULT - ASSESSMENT
Assessment:  Complete heart block noted, hemodynamically stable  Variable AV block  h/o atrial fibrillation on xarelto    Plan:  CCU monitoring  hold off cardizem and beta blocker for now  pacer pads on patient  no need for TVP now  EP evaluation for PPM  obtain 2D Echo  send thyroid profile and lyme titers

## 2019-12-12 NOTE — CHART NOTE - NSCHARTNOTEFT_GEN_A_CORE
Josie Gonzalez is a 88 y/o female w/ a pmh of HTN, afib on xerelto, DMT2, DLD, who presents to the ED with a complaint of a fall in which she hit the back of her head. Patient states she got up out of her chair day of adissioan and was walking with her walker when she lost balance and fell backwards hitting her head. She says she had a headache and bruising in the back of her head but it is now only tender to palpation. Patient says she did not have any dizziness or presyncopal symptoms prior to the fall. patient says she felt dizziness day prior to admission. patient says she did not have loc prior to or after the fall. She was able to get up after the fall with the help of her son. patient was A&Ox3. Patient says this has happened to her 4 times in the past year and she always falls backward onto her head. Patient denies a history of seizure. Patient denied chest pain, palpitations, sob,n/v, abdominal pain, dysuria, increased urinary frequency, fever, headache, neck pain, vision changes, hearing changes, back pain. Admission EKG  shows sinus laura and rbbb    Vital Signs Last 24 Hrs  T(C): 36.3 (12 Dec 2019 16:11), Max: 36.3 (12 Dec 2019 16:11)  T(F): 97.4 (12 Dec 2019 16:11), Max: 97.4 (12 Dec 2019 16:11)  HR: 45 (12 Dec 2019 16:11) (45 - 78)  BP: 115/70 (12 Dec 2019 16:11) (115/70 - 180/69)  RR: 18 (12 Dec 2019 16:11) (16 - 18)    Received a call from RN around 11:00 am as patient was feeling weak and lethargic while working with Physical Therapy.  Patient was found to have HR in the 30s, orthostatics were negative. Ordered STAT EKG, rapid response was called and patient was placed on a monitor.    /65 O2 sat 99% HR 32 Temp 97.2 RR 18    PHYSICAL EXAM:  GENERAL: Lethargic but responsive  HEAD:  Atraumatic, Normocephalic  CHEST/LUNG: Clear to auscultation bilaterally;  HEART: Bradycardic, regular rhythm  Soft, Nontender, Nondistended  EXTREMITIES:  2+ Peripheral Pulses. No LE edema  NERVOUS SYSTEM:  confused, but speech clear. No focal deficits , responds to commands  MSK: FROM all 4 extremities, full and equal strength,      Assessment and Plan:  Josie Gonzalez is a 88 y/o female w/ a PMG of HTN, afib on xerelto, DMT2, DLD, who presents to the ED with a complaint of a fall in which she hit the back of her head. She experienced 4 similar episodes during the past year. CT scan showed extra-calvarial hematoma. Initial EKG shows sinus laura and rbbb. Now with intermitted 3rd degree heart block and symptomatic laura. Will transfer to CCU    Lethargy - Symptomatic Bradycardia 2/2 3rd Degree AV Block  - HR in the 30s, admission EKG showed Left axis deviation and RBBB  - Diltiazem CD, Metoprolol and Xarelto stopped as per Dr. Elizondo  - pacing pads placed, s/p 0.5 IV atropine x 2 given and  3mg IV Glucagon  - Cardio and EPS consulted  - Repeat EKG and strip confirms 3 degree AV block per Dr. Elizondo  - STAT CXR Unremarkable, f/u offical rad  - CBC, CMP, Coags, Lactate, Cardiac Enzymes x 2 ordered  - will upgrade to CCU, no need for transvenous pacemaker at this time as per Cardio  - family and Dr. Villa made aware  - 60 mg/kg Calcium gluconate push x 1 given  - hemodynamically stable, continue to monitor vitals  - Patient received Amiodarone at [facility]'s office as per emr records ( not in h and p)  - please follow up with pharmacy in am to verify med rec    #Head Trauma secondary to mechanical fall  - CT Head-  Left parietal extracalvarial scalp soft tissue swelling/hematoma. No evidence of underlying fracture.  - ct abdomen pelvis negative  - Status post cholecystectomy. Pneumobilia, new from prior CT.   -ct chest- Right-sided pulmonary nodules. 6-12 follow-up can be performed as   clinically warranted.  -Orthostatics negative  - f/u tsh  - f/u echo  - consider cardio consult to discontinue xeralto in light of frequent falls and head trauma  - f/u physiatry  - f/u pt/rehab    #Afib  -xerelto  -metoprolol and diltiazem  -on hold    #HTN  - holding metoprolol    #T2DM  - initiative sliding scale   - Sugar in the 300s, patient given glucagon as well, administered 6 units Lispro  - started basal bolus    Diet-dash  dvt ppx- on xerelto  gi ppx- protonix    F/u repeat labs including Lyme studies at 8 pm, f/u Echo, f/u EKG, f/u cardio and EP recs

## 2019-12-12 NOTE — PHYSICAL THERAPY INITIAL EVALUATION ADULT - PLANNED THERAPY INTERVENTIONS, PT EVAL
balance training/strengthening/transfer training/bed mobility training/ROM/gait training/neuromuscular re-education/postural re-education/stretching

## 2019-12-12 NOTE — PHYSICAL THERAPY INITIAL EVALUATION ADULT - CRITERIA FOR SKILLED THERAPEUTIC INTERVENTIONS
impairments found/functional limitations in following categories/anticipated equipment needs at discharge/predicted duration of therapy intervention/anticipated discharge recommendation/risk reduction/prevention/rehab potential

## 2019-12-12 NOTE — PROGRESS NOTE ADULT - SUBJECTIVE AND OBJECTIVE BOX
brought to the ER after fall at home.  The pt was walking with walker and fell backward hitting back of head, helped up by son and brought to the ER as a trauma level.  The pt states she hit the back of her head but denied CP, palp, SOB or LOC.  Pt had 4 similar falls at home..  In the ER CT of head, C spine chest and abd and pelvis was negative for any acute pathology.  The pt is transferred to medicine for further evaluation and care.  The PMHX includes:  HTN, aSHD, , afib on Xarelto, DM II, DLD, OA, DDD, DJD, hip surgery, mobility dysfunction, + walker dependent.    INTERVAL HPI/OVERNIGHT EVENTS: today pt had episode of weakness and lethargy while working with PT, RR called and pt noted to be bradycardic 30s and in 3rd deg block, being transferred to CCU, cardiac meds cardizem and BB blocker being adjusted    MEDICATIONS  (STANDING):  dextrose 5%. 1000 milliLiter(s) (50 mL/Hr) IV Continuous <Continuous>  dextrose 50% Injectable 12.5 Gram(s) IV Push once  dextrose 50% Injectable 25 Gram(s) IV Push once  dextrose 50% Injectable 25 Gram(s) IV Push once  docusate sodium Oral Tab/Cap - Peds 100 milliGRAM(s) Oral three times a day  insulin glargine Injectable (LANTUS) 10 Unit(s) SubCutaneous at bedtime  insulin lispro (HumaLOG) corrective regimen sliding scale   SubCutaneous three times a day before meals  insulin lispro Injectable (HumaLOG) 4 Unit(s) SubCutaneous three times a day before meals  magnesium oxide 400 milliGRAM(s) Oral once  magnesium sulfate  IVPB 2 Gram(s) IV Intermittent once  pantoprazole    Tablet 40 milliGRAM(s) Oral before breakfast  simvastatin 40 milliGRAM(s) Oral at bedtime    MEDICATIONS  (PRN):  acetaminophen   Tablet .. 650 milliGRAM(s) Oral every 6 hours PRN Mild Pain (1 - 3)  dextrose 40% Gel 15 Gram(s) Oral once PRN Blood Glucose LESS THAN 70 milliGRAM(s)/deciliter  glucagon  Injectable 1 milliGRAM(s) IntraMuscular once PRN Glucose LESS THAN 70 milligrams/deciliter      Allergies    No Known Drug Allergies  Originally Entered as [RASH] reaction to [cashew nut] (Unknown)    Vital Signs Last 24 Hrs  T(C): 36.1 (12 Dec 2019 07:30), Max: 36.1 (12 Dec 2019 07:30)  T(F): 96.9 (12 Dec 2019 07:30), Max: 96.9 (12 Dec 2019 07:30)  HR: 61 (12 Dec 2019 08:02) (52 - 78), 30s during RR  BP: 180/69 (12 Dec 2019 08:02) (138/63 - 180/69)  BP(mean): --  RR: 16 (12 Dec 2019 07:30) (16 - 18)  SpO2: --    PHYSICAL EXAM:      Constitutional: pt alert, oriented, NAD    Eyes:  nonicteric    ENMT: dry oral mucosa, dental defects    Neck:  supple, no JVD, no bruits    Back: TH kyphosis    Respiratory:  shallow respirations, scattered rhonchi, no wheezing, crackles or rales    Cardiovascular:  S1S2 laura    Gastrointestinal:  globose, soft and benign    Genitourinary:  no thomas    Extremities:+ arthritic changes, moves all ext    Vascular:  dec pedal pulses    Neurological:  nonfocal    Skin: posterior scalp hematoma    Lymph Nodes:  not enlaarged    LABS:                        8.3    6.66  )-----------( 232      ( 12 Dec 2019 12:00 )             27.5     12-12    137  |  104  |  18  ----------------------------<  303<H>  4.6   |  21  |  0.8    Ca    8.3<L>      12 Dec 2019 12:00  Mg     1.7     12-12    TPro  4.7<L>  /  Alb  3.0<L>  /  TBili  0.3  /  DBili  x   /  AST  12  /  ALT  10  /  AlkPhos  72  12-12    PT/INR - ( 12 Dec 2019 12:00 )   PT: 11.60 sec;   INR: 1.01 ratio         PTT - ( 11 Dec 2019 10:00 )  PTT:32.1 sec      RADIOLOGY & ADDITIONAL TESTS:  CTH:  atrophy, severe microvascular white matter changes, stable lacunar infarcts, L scalp hematoma, calvarium intact    CT of C spine:  multi level DDD, nofx or dislocation  CT of chest and abd:  in summary no acute pathology

## 2019-12-12 NOTE — CHART NOTE - NSCHARTNOTEFT_GEN_A_CORE
Received a call from RN around 11:00 am as patient was feeling weak and lethargic while working with Physical Therapy.  Patient was found to have HR in the 30s, orthostatics were negative. Ordered STAT EKG, rapid response was called and patient was placed on a monitor.    /65 O2 sat 99% HR 32 Temp 97.2 RR 18    PHYSICAL EXAM:  GENERAL: Lethargic but responsive  HEAD:  Atraumatic, Normocephalic  CHEST/LUNG: Clear to auscultation bilaterally;  HEART: Bradycardic, regular rhythm  Soft, Nontender, Nondistended  EXTREMITIES:  2+ Peripheral Pulses. No LE edema  NERVOUS SYSTEM:  Alert & Oriented X3, speech clear. No focal deficits   MSK: FROM all 4 extremities, full and equal strength,      Assessment and Plan:  Josie Gonzalez is a 88 y/o female w/ a PMG of HTN, afib on xerelto, DMT2, DLD, who presents to the ED with a complaint of a fall in which she hit the back of her head. She experienced 4 similar episodes during the past year. CT scan showed extra-calvarial hematoma. Initial EKG    Lethargy - Symptomatic Bradycardia 2/2 3rd Degree AV Block    - HR in the 30s, admission EKG showed Left axis deviation and RBBB  - Diltiazem CD, Metoprolol and Xarelto stopped  - pacing pads placed, s/p 0.5 IV atropine x 2 given and  2mg IV Glucagon  - Cardio and EPS consulted  - Repeat EKG and strip confirm 3 degree AV block per Dr. Elizondo  - STAT CXR. CBC, CMP, Coags, Lactate, Cardiac Enzymes ordered  - will upgrade to CCU, no need for transvenous pacemaker at this time  - hemodynamically stable, continue to monitor vitals  - Sugar in the 300s, patient given glucagon as well, administered 6 units Lispro and repeat FS  - family and Dr. Villa made aware

## 2019-12-12 NOTE — PHYSICAL THERAPY INITIAL EVALUATION ADULT - ADDITIONAL COMMENTS
PTA: pt I c RW. Lives in a PH c son c MALIKA c R HR c chairlift to 2nd floor. Walk in shower c built in shower chair. Lives c son. No HHA.

## 2019-12-12 NOTE — CONSULT NOTE ADULT - SUBJECTIVE AND OBJECTIVE BOX
Patient is a 89y old  Female who presents with a chief complaint of Sp Fall at home, cleared by Trauma (12 Dec 2019 15:31)        HPI:  Josie Gonzalez is a 88 y/o female w/ a pmh of HTN, afib on xerelto, DMT2, DLD, who presents to the ED with a complaint of a fall in which she hit the back of her head. Patient states she got up out of her chair this morning and was walking with her walker when she lost balance and fell backwards hitting her head. She says she had a headache and bruising in the back of her head but it is now only tender to palpation. Patient says she did not have any dizziness or presyncopal symptoms prior to the fall. patient says she felt dizziness yesterday. patient says she did not have loc prior to or after the fall. She was able to get up after the fall with the help of her son. patient was A&Ox3. Patient says this has happened to her 4 times in the past year and she always falls backward onto her head. Patient denies a history of seizure. Patient denied chest pain, palpitations, sob,n/v, abdominal pain, dysuria, increased urinary frequency, fever, headache, neck pain, vision changes, hearing changes, back pain.    In ED trauma work up ct head showed extracalvarial scalp soft tissue swelling/hematoma, ct cervical spine negative, ct abdomen pelvis/chest negative . afebrile bp 147/67 ekg shows sinus laura and rbbb.           PAST MEDICAL & SURGICAL HISTORY:  Atrial fibrillation  High blood cholesterol  Hypertension  Diabetes mellitus, type 2  History of hip surgery          PREVIOUS DIAGNOSTIC TESTING:      ECHO  FINDINGS:  < from: Transthoracic Echocardiogram (03.22.19 @ 13:33) >  Summary:   1. Left ventricular ejection fraction, by visual estimation, is 55 to   60%.   2. Normal global left ventricular systolic function.   3. Normal left ventricular internal cavity size.   4. Spectral Doppler shows impaired relaxation pattern of left   ventricular myocardial filling (Grade I diastolic dysfunction).   5. Normal left atrial size.   6. Normal right atrial size.   7. There is no evidence of pericardial effusion.   8. Moderate mitral annular calcification.   9. Thickening and calcification of the anterior and posterior mitral   valve leaflets.  10. Trace tricuspid regurgitation.  11. Mild aortic regurgitation.    < end of copied text >    STRESS  FINDINGS:    CATHETERIZATION  FINDINGS:    ELECTROPHYSIOLOGY STUDY  FINDINGS:    CAROTID ULTRASOUND:  FINDINGS    VENOUS DUPLEX SCAN:  FINDINGS:    CHEST CT PULMONARY ANGIO with IV Contrast:  FINDINGS:    MEDICATIONS  (STANDING):  dextrose 5%. 1000 milliLiter(s) (50 mL/Hr) IV Continuous <Continuous>  dextrose 50% Injectable 12.5 Gram(s) IV Push once  dextrose 50% Injectable 25 Gram(s) IV Push once  dextrose 50% Injectable 25 Gram(s) IV Push once  docusate sodium Oral Tab/Cap - Peds 100 milliGRAM(s) Oral three times a day  insulin glargine Injectable (LANTUS) 10 Unit(s) SubCutaneous at bedtime  insulin lispro (HumaLOG) corrective regimen sliding scale   SubCutaneous three times a day before meals  insulin lispro Injectable (HumaLOG) 4 Unit(s) SubCutaneous three times a day before meals  pantoprazole    Tablet 40 milliGRAM(s) Oral before breakfast  simvastatin 40 milliGRAM(s) Oral at bedtime    MEDICATIONS  (PRN):  acetaminophen   Tablet .. 650 milliGRAM(s) Oral every 6 hours PRN Mild Pain (1 - 3)  dextrose 40% Gel 15 Gram(s) Oral once PRN Blood Glucose LESS THAN 70 milliGRAM(s)/deciliter  glucagon  Injectable 1 milliGRAM(s) IntraMuscular once PRN Glucose LESS THAN 70 milligrams/deciliter      FAMILY HISTORY:  No pertinent family history in first degree relatives      SOCIAL HISTORY:    CIGARETTES: negative    ALCOHOL: negative    Past Surgical History:    Allergies:    No Known Drug Allergies  Originally Entered as [RASH] reaction to [cashew nut] (Unknown)      REVIEW OF SYSTEMS:    CONSTITUTIONAL: No fever, weight loss, chills, shakes, or fatigue  EYES: No eye pain, visual disturbances, or discharge  ENMT:  No difficulty hearing, tinnitus, vertigo; No sinus or throat pain  NECK: No pain or stiffness  BREASTS: No pain, masses, or nipple discharge  RESPIRATORY: No cough, wheezing, hemoptysis, or shortness of breath  CARDIOVASCULAR: No chest pain, dyspnea, palpitations, dizziness, syncope, paroxysmal nocturnal dyspnea, orthopnea, or arm or leg swelling  GASTROINTESTINAL: No abdominal  or epigastric pain, nausea, vomiting, hematemesis, diarrhea, constipation, melena or bright red blood.  GENITOURINARY: No dysuria, nocturia, hematuria, or urinary incontinence  NEUROLOGICAL: recurrent falls  SKIN: No itching, burning, rashes, or lesions   LYMPH NODES: No enlarged glands  ENDOCRINE: No heat or cold intolerance, or hair loss  MUSCULOSKELETAL: No joint pain or swelling, muscle, back, or extremity pain  PSYCHIATRIC: No depression, anxiety, or difficulty sleeping  HEME/LYMPH: No easy bruising or bleeding gums  ALLERY AND IMMUNOLOGIC: No hives or rash.      Vital Signs Last 24 Hrs  T(C): 36.3 (12 Dec 2019 16:11), Max: 36.3 (12 Dec 2019 16:11)  T(F): 97.4 (12 Dec 2019 16:11), Max: 97.4 (12 Dec 2019 16:11)  HR: 45 (12 Dec 2019 16:11) (45 - 78)  BP: 115/70 (12 Dec 2019 16:11) (115/70 - 180/69)  BP(mean): --  RR: 18 (12 Dec 2019 16:11) (16 - 18)  SpO2: --    PHYSICAL EXAM:        GENERAL: In no apparent distress, well nourished, and hydrated.  HEAD:  Atraumatic, Normocephalic  EYES: EOMI, PERRLA, conjunctiva and sclera clear  ENMT: No tonsillar erythema, exudates, or enlargements; ist mucous membranes, Good dentition, No lesions  NECK: Supple and normal thyroid.  No JVD or carotid bruit.  Carotid pulse is 2+ bilaterally.  HEART: bradycardia; No murmurs, rubs, or gallops.  PULMONARY: Clear to auscultation and perfusion.  No rales, wheezing, or rhonchi bilaterally.  ABDOMEN: Soft, Nontender, Nondistended; Bowel sounds present  EXTREMITIES:  2+ Peripheral Pulses, No clubbing, cyanosis, or edema  LYMPH: No lymphadenopathy noted  NEUROLOGICAL: Grossly nonfocal      INTERPRETATION OF TELEMETRY: Sinus bradycardia 40s    ECG: Sinus bradycardia, RBBB, LAFB    I&O's Detail    11 Dec 2019 07:01  -  12 Dec 2019 07:00  --------------------------------------------------------  IN:  Total IN: 0 mL    OUT:    Voided: 700 mL  Total OUT: 700 mL    Total NET: -700 mL      12 Dec 2019 07:01  -  12 Dec 2019 18:23  --------------------------------------------------------  IN:    Oral Fluid: 770 mL    Sodium Chloride 0.9% IV Bolus: 500 mL  Total IN: 1270 mL    OUT:    Voided: 700 mL  Total OUT: 700 mL    Total NET: 570 mL          LABS:                        8.3    6.66  )-----------( 232      ( 12 Dec 2019 12:00 )             27.5     12-12    137  |  104  |  18  ----------------------------<  303<H>  4.6   |  21  |  0.8    Ca    8.3<L>      12 Dec 2019 12:00  Mg     1.7     12-12    TPro  4.7<L>  /  Alb  3.0<L>  /  TBili  0.3  /  DBili  x   /  AST  12  /  ALT  10  /  AlkPhos  72  12-12    CARDIAC MARKERS ( 12 Dec 2019 12:00 )  x     / <0.01 ng/mL / 27 U/L / x     / 2.5 ng/mL  CARDIAC MARKERS ( 11 Dec 2019 20:35 )  x     / <0.01 ng/mL / x     / x     / x      CARDIAC MARKERS ( 11 Dec 2019 10:00 )  x     / <0.01 ng/mL / x     / x     / x          PT/INR - ( 12 Dec 2019 12:00 )   PT: 11.60 sec;   INR: 1.01 ratio         PTT - ( 11 Dec 2019 10:00 )  PTT:32.1 sec    BNP  I&O's Detail    11 Dec 2019 07:01  -  12 Dec 2019 07:00  --------------------------------------------------------  IN:  Total IN: 0 mL    OUT:    Voided: 700 mL  Total OUT: 700 mL    Total NET: -700 mL      12 Dec 2019 07:01  -  12 Dec 2019 18:23  --------------------------------------------------------  IN:    Oral Fluid: 770 mL    Sodium Chloride 0.9% IV Bolus: 500 mL  Total IN: 1270 mL    OUT:    Voided: 700 mL  Total OUT: 700 mL    Total NET: 570 mL        Daily     Daily     RADIOLOGY & ADDITIONAL STUDIES: Patient is a 89y old  Female who presents with a chief complaint ofpost  Fall at home, cleared by Trauma (12 Dec 2019 15:31)        HPI:  Josie Gonzalez is a 89 y/o female w/ a pmh of HTN, afib on xerelto, DMT2, DLD, who presents to the ED with a complaint of a fall in which she hit the back of her head. Patient states she got up out of her chair this morning and was walking with her walker when she lost balance and fell backwards hitting her head. She says she had a headache and bruising in the back of her head but it is now only tender to palpation. Patient says she did not have any dizziness or presyncopal symptoms prior to the fall. patient says she felt dizziness yesterday. patient says she did not have loc prior to or after the fall. She was able to get up after the fall with the help of her son. patient was A&Ox3. Patient says this has happened to her 4 times in the past year and she always falls backward onto her head. Patient denies a history of seizure. Patient denied chest pain, palpitations, sob,n/v, abdominal pain, dysuria, increased urinary frequency, fever, headache, neck pain, vision changes, hearing changes, back pain.    In ED trauma work up ct head showed extracalvarial scalp soft tissue swelling/hematoma, ct cervical spine negative, ct abdomen pelvis/chest negative . afebrile bp 147/67 ekg shows sinus laura and rbbb.           PAST MEDICAL & SURGICAL HISTORY:  Atrial fibrillation  High blood cholesterol  Hypertension  Diabetes mellitus, type 2  History of hip surgery          PREVIOUS DIAGNOSTIC TESTING:      ECHO  FINDINGS:  < from: Transthoracic Echocardiogram (03.22.19 @ 13:33) >  Summary:   1. Left ventricular ejection fraction, by visual estimation, is 55 to   60%.   2. Normal global left ventricular systolic function.   3. Normal left ventricular internal cavity size.   4. Spectral Doppler shows impaired relaxation pattern of left   ventricular myocardial filling (Grade I diastolic dysfunction).   5. Normal left atrial size.   6. Normal right atrial size.   7. There is no evidence of pericardial effusion.   8. Moderate mitral annular calcification.   9. Thickening and calcification of the anterior and posterior mitral   valve leaflets.  10. Trace tricuspid regurgitation.  11. Mild aortic regurgitation.    < end of copied text >    STRESS  FINDINGS:    CATHETERIZATION  FINDINGS:    ELECTROPHYSIOLOGY STUDY  FINDINGS:    CAROTID ULTRASOUND:  FINDINGS    VENOUS DUPLEX SCAN:  FINDINGS:    CHEST CT PULMONARY ANGIO with IV Contrast:  FINDINGS:    MEDICATIONS  (STANDING):  dextrose 5%. 1000 milliLiter(s) (50 mL/Hr) IV Continuous <Continuous>  dextrose 50% Injectable 12.5 Gram(s) IV Push once  dextrose 50% Injectable 25 Gram(s) IV Push once  dextrose 50% Injectable 25 Gram(s) IV Push once  docusate sodium Oral Tab/Cap - Peds 100 milliGRAM(s) Oral three times a day  insulin glargine Injectable (LANTUS) 10 Unit(s) SubCutaneous at bedtime  insulin lispro (HumaLOG) corrective regimen sliding scale   SubCutaneous three times a day before meals  insulin lispro Injectable (HumaLOG) 4 Unit(s) SubCutaneous three times a day before meals  pantoprazole    Tablet 40 milliGRAM(s) Oral before breakfast  simvastatin 40 milliGRAM(s) Oral at bedtime    MEDICATIONS  (PRN):  acetaminophen   Tablet .. 650 milliGRAM(s) Oral every 6 hours PRN Mild Pain (1 - 3)  dextrose 40% Gel 15 Gram(s) Oral once PRN Blood Glucose LESS THAN 70 milliGRAM(s)/deciliter  glucagon  Injectable 1 milliGRAM(s) IntraMuscular once PRN Glucose LESS THAN 70 milligrams/deciliter      FAMILY HISTORY:  No pertinent family history in first degree relatives      SOCIAL HISTORY:    CIGARETTES: negative    ALCOHOL: negative    Past Surgical History:    Allergies:    No Known Drug Allergies  Originally Entered as [RASH] reaction to [cashew nut] (Unknown)      REVIEW OF SYSTEMS:    CONSTITUTIONAL: No fever, weight loss, chills, shakes, or fatigue  EYES: No eye pain, visual disturbances, or discharge  ENMT:  No difficulty hearing, tinnitus, vertigo; No sinus or throat pain  NECK: No pain or stiffness  BREASTS: No pain, masses, or nipple discharge  RESPIRATORY: No cough, wheezing, hemoptysis, or shortness of breath  CARDIOVASCULAR: No chest pain, dyspnea, palpitations, dizziness, syncope, paroxysmal nocturnal dyspnea, orthopnea, or arm or leg swelling  GASTROINTESTINAL: No abdominal  or epigastric pain, nausea, vomiting, hematemesis, diarrhea, constipation, melena or bright red blood.  GENITOURINARY: No dysuria, nocturia, hematuria, or urinary incontinence  NEUROLOGICAL: recurrent falls  SKIN: No itching, burning, rashes, or lesions   LYMPH NODES: No enlarged glands  ENDOCRINE: No heat or cold intolerance, or hair loss  MUSCULOSKELETAL: No joint pain or swelling, muscle, back, or extremity pain  PSYCHIATRIC: No depression, anxiety, or difficulty sleeping  HEME/LYMPH: No easy bruising or bleeding gums  ALLERY AND IMMUNOLOGIC: No hives or rash.      Vital Signs Last 24 Hrs  T(C): 36.3 (12 Dec 2019 16:11), Max: 36.3 (12 Dec 2019 16:11)  T(F): 97.4 (12 Dec 2019 16:11), Max: 97.4 (12 Dec 2019 16:11)  HR: 45 (12 Dec 2019 16:11) (45 - 78)  BP: 115/70 (12 Dec 2019 16:11) (115/70 - 180/69)  BP(mean): --  RR: 18 (12 Dec 2019 16:11) (16 - 18)  SpO2: --    PHYSICAL EXAM:        GENERAL: In no apparent distress, well nourished, and hydrated.  HEAD:  Atraumatic, Normocephalic  EYES: EOMI, PERRLA, conjunctiva and sclera clear  ENMT: No tonsillar erythema, exudates, or enlargements; ist mucous membranes, Good dentition, No lesions  NECK: Supple and normal thyroid.  No JVD or carotid bruit.  Carotid pulse is 2+ bilaterally.  HEART: bradycardia; No murmurs, rubs, or gallops.  PULMONARY: Clear to auscultation and perfusion.  No rales, wheezing, or rhonchi bilaterally.  ABDOMEN: Soft, Nontender, Nondistended; Bowel sounds present  EXTREMITIES:  2+ Peripheral Pulses, No clubbing, cyanosis, or edema  LYMPH: No lymphadenopathy noted  NEUROLOGICAL: Grossly nonfocal      INTERPRETATION OF TELEMETRY: Sinus bradycardia 40s    ECG: Sinus bradycardia, RBBB, LAFB    I&O's Detail    11 Dec 2019 07:01  -  12 Dec 2019 07:00  --------------------------------------------------------  IN:  Total IN: 0 mL    OUT:    Voided: 700 mL  Total OUT: 700 mL    Total NET: -700 mL      12 Dec 2019 07:01  -  12 Dec 2019 18:23  --------------------------------------------------------  IN:    Oral Fluid: 770 mL    Sodium Chloride 0.9% IV Bolus: 500 mL  Total IN: 1270 mL    OUT:    Voided: 700 mL  Total OUT: 700 mL    Total NET: 570 mL          LABS:                        8.3    6.66  )-----------( 232      ( 12 Dec 2019 12:00 )             27.5     12-12    137  |  104  |  18  ----------------------------<  303<H>  4.6   |  21  |  0.8    Ca    8.3<L>      12 Dec 2019 12:00  Mg     1.7     12-12    TPro  4.7<L>  /  Alb  3.0<L>  /  TBili  0.3  /  DBili  x   /  AST  12  /  ALT  10  /  AlkPhos  72  12-12    CARDIAC MARKERS ( 12 Dec 2019 12:00 )  x     / <0.01 ng/mL / 27 U/L / x     / 2.5 ng/mL  CARDIAC MARKERS ( 11 Dec 2019 20:35 )  x     / <0.01 ng/mL / x     / x     / x      CARDIAC MARKERS ( 11 Dec 2019 10:00 )  x     / <0.01 ng/mL / x     / x     / x          PT/INR - ( 12 Dec 2019 12:00 )   PT: 11.60 sec;   INR: 1.01 ratio         PTT - ( 11 Dec 2019 10:00 )  PTT:32.1 sec    BNP  I&O's Detail    11 Dec 2019 07:01  -  12 Dec 2019 07:00  --------------------------------------------------------  IN:  Total IN: 0 mL    OUT:    Voided: 700 mL  Total OUT: 700 mL    Total NET: -700 mL      12 Dec 2019 07:01  -  12 Dec 2019 18:23  --------------------------------------------------------  IN:    Oral Fluid: 770 mL    Sodium Chloride 0.9% IV Bolus: 500 mL  Total IN: 1270 mL    OUT:    Voided: 700 mL  Total OUT: 700 mL    Total NET: 570 mL        Daily     Daily     RADIOLOGY & ADDITIONAL STUDIES:

## 2019-12-12 NOTE — CONSULT NOTE ADULT - ATTENDING COMMENTS
I examined the patient with the resident and discussed my plan with him    the patient has no traumatic injuries and does not warrant trauma admission
pt w/junctional bradycardia. transfer to [facility]. hold bb and diltiazem.

## 2019-12-12 NOTE — CONSULT NOTE ADULT - CONSULT REASON
Complete AV block
Complete heart block
fall
s/p fall from standing +HT, -LOC, +AC (Xarelto for Afib)
Statement Selected

## 2019-12-13 LAB
ALBUMIN SERPL ELPH-MCNC: 3.3 G/DL — LOW (ref 3.5–5.2)
ALP SERPL-CCNC: 85 U/L — SIGNIFICANT CHANGE UP (ref 30–115)
ALT FLD-CCNC: 12 U/L — SIGNIFICANT CHANGE UP (ref 0–41)
ANION GAP SERPL CALC-SCNC: 14 MMOL/L — SIGNIFICANT CHANGE UP (ref 7–14)
APPEARANCE UR: ABNORMAL
APTT BLD: 28.6 SEC — SIGNIFICANT CHANGE UP (ref 27–39.2)
AST SERPL-CCNC: 13 U/L — SIGNIFICANT CHANGE UP (ref 0–41)
BACTERIA # UR AUTO: ABNORMAL
BACTERIA # UR AUTO: ABNORMAL
BASOPHILS # BLD AUTO: 0.01 K/UL — SIGNIFICANT CHANGE UP (ref 0–0.2)
BASOPHILS NFR BLD AUTO: 0.2 % — SIGNIFICANT CHANGE UP (ref 0–1)
BILIRUB SERPL-MCNC: 0.4 MG/DL — SIGNIFICANT CHANGE UP (ref 0.2–1.2)
BILIRUB UR-MCNC: NEGATIVE — SIGNIFICANT CHANGE UP
BUN SERPL-MCNC: 17 MG/DL — SIGNIFICANT CHANGE UP (ref 10–20)
CALCIUM SERPL-MCNC: 9.1 MG/DL — SIGNIFICANT CHANGE UP (ref 8.5–10.1)
CHLORIDE SERPL-SCNC: 102 MMOL/L — SIGNIFICANT CHANGE UP (ref 98–110)
CO2 SERPL-SCNC: 23 MMOL/L — SIGNIFICANT CHANGE UP (ref 17–32)
COLOR SPEC: YELLOW — SIGNIFICANT CHANGE UP
CREAT SERPL-MCNC: 0.7 MG/DL — SIGNIFICANT CHANGE UP (ref 0.7–1.5)
DIFF PNL FLD: NEGATIVE — SIGNIFICANT CHANGE UP
EOSINOPHIL # BLD AUTO: 0.03 K/UL — SIGNIFICANT CHANGE UP (ref 0–0.7)
EOSINOPHIL NFR BLD AUTO: 0.5 % — SIGNIFICANT CHANGE UP (ref 0–8)
EPI CELLS # UR: 1 /HPF — SIGNIFICANT CHANGE UP (ref 0–5)
EPI CELLS # UR: 4 /HPF — SIGNIFICANT CHANGE UP (ref 0–5)
ESTIMATED AVERAGE GLUCOSE: 126 MG/DL — HIGH (ref 68–114)
GLUCOSE BLDC GLUCOMTR-MCNC: 146 MG/DL — HIGH (ref 70–99)
GLUCOSE BLDC GLUCOMTR-MCNC: 170 MG/DL — HIGH (ref 70–99)
GLUCOSE BLDC GLUCOMTR-MCNC: 194 MG/DL — HIGH (ref 70–99)
GLUCOSE BLDC GLUCOMTR-MCNC: 243 MG/DL — HIGH (ref 70–99)
GLUCOSE SERPL-MCNC: 164 MG/DL — HIGH (ref 70–99)
GLUCOSE UR QL: NEGATIVE — SIGNIFICANT CHANGE UP
HBA1C BLD-MCNC: 6 % — HIGH (ref 4–5.6)
HCT VFR BLD CALC: 28.1 % — LOW (ref 37–47)
HGB BLD-MCNC: 8.6 G/DL — LOW (ref 12–16)
HYALINE CASTS # UR AUTO: 1 /LPF — SIGNIFICANT CHANGE UP (ref 0–7)
HYALINE CASTS # UR AUTO: 2 /LPF — SIGNIFICANT CHANGE UP (ref 0–7)
IMM GRANULOCYTES NFR BLD AUTO: 0.5 % — HIGH (ref 0.1–0.3)
KETONES UR-MCNC: NEGATIVE — SIGNIFICANT CHANGE UP
LEUKOCYTE ESTERASE UR-ACNC: ABNORMAL
LYMPHOCYTES # BLD AUTO: 0.6 K/UL — LOW (ref 1.2–3.4)
LYMPHOCYTES # BLD AUTO: 9.4 % — LOW (ref 20.5–51.1)
MAGNESIUM SERPL-MCNC: 1.8 MG/DL — SIGNIFICANT CHANGE UP (ref 1.8–2.4)
MCHC RBC-ENTMCNC: 25.6 PG — LOW (ref 27–31)
MCHC RBC-ENTMCNC: 30.6 G/DL — LOW (ref 32–37)
MCV RBC AUTO: 83.6 FL — SIGNIFICANT CHANGE UP (ref 81–99)
MONOCYTES # BLD AUTO: 0.55 K/UL — SIGNIFICANT CHANGE UP (ref 0.1–0.6)
MONOCYTES NFR BLD AUTO: 8.6 % — SIGNIFICANT CHANGE UP (ref 1.7–9.3)
NEUTROPHILS # BLD AUTO: 5.17 K/UL — SIGNIFICANT CHANGE UP (ref 1.4–6.5)
NEUTROPHILS NFR BLD AUTO: 80.8 % — HIGH (ref 42.2–75.2)
NITRITE UR-MCNC: NEGATIVE — SIGNIFICANT CHANGE UP
NRBC # BLD: 0 /100 WBCS — SIGNIFICANT CHANGE UP (ref 0–0)
PH UR: 5.5 — SIGNIFICANT CHANGE UP (ref 5–8)
PLATELET # BLD AUTO: 251 K/UL — SIGNIFICANT CHANGE UP (ref 130–400)
POTASSIUM SERPL-MCNC: 4.9 MMOL/L — SIGNIFICANT CHANGE UP (ref 3.5–5)
POTASSIUM SERPL-SCNC: 4.9 MMOL/L — SIGNIFICANT CHANGE UP (ref 3.5–5)
PROT SERPL-MCNC: 5.3 G/DL — LOW (ref 6–8)
PROT UR-MCNC: NEGATIVE — SIGNIFICANT CHANGE UP
RBC # BLD: 3.36 M/UL — LOW (ref 4.2–5.4)
RBC # FLD: 17.4 % — HIGH (ref 11.5–14.5)
RBC CASTS # UR COMP ASSIST: 116 /HPF — HIGH (ref 0–4)
RBC CASTS # UR COMP ASSIST: 2 /HPF — SIGNIFICANT CHANGE UP (ref 0–4)
SODIUM SERPL-SCNC: 139 MMOL/L — SIGNIFICANT CHANGE UP (ref 135–146)
SP GR SPEC: 1.02 — SIGNIFICANT CHANGE UP (ref 1.01–1.02)
TSH SERPL-MCNC: 7.43 UIU/ML — HIGH (ref 0.27–4.2)
TSH SERPL-MCNC: 7.62 UIU/ML — HIGH (ref 0.27–4.2)
UROBILINOGEN FLD QL: SIGNIFICANT CHANGE UP
WBC # BLD: 6.39 K/UL — SIGNIFICANT CHANGE UP (ref 4.8–10.8)
WBC # FLD AUTO: 6.39 K/UL — SIGNIFICANT CHANGE UP (ref 4.8–10.8)
WBC UR QL: 21 /HPF — HIGH (ref 0–5)
WBC UR QL: 8 /HPF — HIGH (ref 0–5)

## 2019-12-13 PROCEDURE — 99222 1ST HOSP IP/OBS MODERATE 55: CPT

## 2019-12-13 PROCEDURE — 93010 ELECTROCARDIOGRAM REPORT: CPT

## 2019-12-13 PROCEDURE — 93306 TTE W/DOPPLER COMPLETE: CPT | Mod: 26

## 2019-12-13 RX ORDER — RIVAROXABAN 15 MG-20MG
20 KIT ORAL
Refills: 0 | Status: DISCONTINUED | OUTPATIENT
Start: 2019-12-13 | End: 2019-12-18

## 2019-12-13 RX ORDER — AMLODIPINE BESYLATE 2.5 MG/1
5 TABLET ORAL DAILY
Refills: 0 | Status: DISCONTINUED | OUTPATIENT
Start: 2019-12-13 | End: 2019-12-18

## 2019-12-13 RX ORDER — ATORVASTATIN CALCIUM 80 MG/1
80 TABLET, FILM COATED ORAL AT BEDTIME
Refills: 0 | Status: DISCONTINUED | OUTPATIENT
Start: 2019-12-13 | End: 2019-12-13

## 2019-12-13 RX ORDER — HALOPERIDOL DECANOATE 100 MG/ML
1 INJECTION INTRAMUSCULAR ONCE
Refills: 0 | Status: COMPLETED | OUTPATIENT
Start: 2019-12-13 | End: 2019-12-13

## 2019-12-13 RX ORDER — ATORVASTATIN CALCIUM 80 MG/1
20 TABLET, FILM COATED ORAL AT BEDTIME
Refills: 0 | Status: DISCONTINUED | OUTPATIENT
Start: 2019-12-13 | End: 2019-12-18

## 2019-12-13 RX ADMIN — Medication 100 MILLIGRAM(S): at 15:30

## 2019-12-13 RX ADMIN — CHLORHEXIDINE GLUCONATE 1 APPLICATION(S): 213 SOLUTION TOPICAL at 06:05

## 2019-12-13 RX ADMIN — PANTOPRAZOLE SODIUM 40 MILLIGRAM(S): 20 TABLET, DELAYED RELEASE ORAL at 06:05

## 2019-12-13 RX ADMIN — Medication 100 MILLIGRAM(S): at 22:10

## 2019-12-13 RX ADMIN — Medication 1: at 12:03

## 2019-12-13 RX ADMIN — HALOPERIDOL DECANOATE 1 MILLIGRAM(S): 100 INJECTION INTRAMUSCULAR at 23:14

## 2019-12-13 RX ADMIN — ATORVASTATIN CALCIUM 20 MILLIGRAM(S): 80 TABLET, FILM COATED ORAL at 22:10

## 2019-12-13 RX ADMIN — AMLODIPINE BESYLATE 5 MILLIGRAM(S): 2.5 TABLET ORAL at 15:30

## 2019-12-13 RX ADMIN — INSULIN GLARGINE 10 UNIT(S): 100 INJECTION, SOLUTION SUBCUTANEOUS at 23:08

## 2019-12-13 RX ADMIN — Medication 100 MILLIGRAM(S): at 06:05

## 2019-12-13 NOTE — PROGRESS NOTE ADULT - SUBJECTIVE AND OBJECTIVE BOX
Patient is a 89y old  Female who presents with a chief complaint of Fall (12 Dec 2019 18:22)      Subjective:  Patient seen and examined at bedside.        Review Of Systems: No chest pain, shortness of breath, or palpitations.  No dizziness, HR improved to 60 bpm         Medications:  acetaminophen   Tablet .. 650 milliGRAM(s) Oral every 6 hours PRN  chlorhexidine 4% Liquid 1 Application(s) Topical <User Schedule>  dextrose 40% Gel 15 Gram(s) Oral once PRN  dextrose 5%. 1000 milliLiter(s) IV Continuous <Continuous>  dextrose 50% Injectable 12.5 Gram(s) IV Push once  dextrose 50% Injectable 25 Gram(s) IV Push once  dextrose 50% Injectable 25 Gram(s) IV Push once  docusate sodium Oral Tab/Cap - Peds 100 milliGRAM(s) Oral three times a day  glucagon  Injectable 1 milliGRAM(s) IntraMuscular once PRN  insulin glargine Injectable (LANTUS) 10 Unit(s) SubCutaneous at bedtime  insulin lispro (HumaLOG) corrective regimen sliding scale   SubCutaneous three times a day before meals  insulin lispro Injectable (HumaLOG) 4 Unit(s) SubCutaneous three times a day before meals  pantoprazole    Tablet 40 milliGRAM(s) Oral before breakfast  simvastatin 40 milliGRAM(s) Oral at bedtime      PAST MEDICAL & SURGICAL HISTORY:  Atrial fibrillation  High blood cholesterol  Hypertension  Diabetes mellitus, type 2  History of hip surgery      Objective:  Vitals:  T(F): 96 (12-13), Max: 98.4 (12-12)  HR: 66 (12-13) (45 - 68)  BP: 152/83 (12-13) (115/70 - 160/59)  RR: 30 (12-13)  SpO2: 98% (12-13)  I&O's Summary    12 Dec 2019 07:01  -  13 Dec 2019 07:00  --------------------------------------------------------  IN: 1750 mL / OUT: 1350 mL / NET: 400 mL    13 Dec 2019 07:01  -  13 Dec 2019 11:47  --------------------------------------------------------  IN: 0 mL / OUT: 400 mL / NET: -400 mL        Physical Exam:  Appearance: No acute distress; well appearing  Eyes: PERRL, EOMI, pink conjunctiva  HENT: Normal oral muscosa  Cardiovascular: RRR, S1, S2, no murmurs, rubs, or gallops; no edema; no JVD  Respiratory: Clear to auscultation bilaterally  Gastrointestinal: soft, non-tender, non-distended with normal bowel sounds  Musculoskeletal: No clubbing; no joint deformity   Neurologic: Non-focal  Lymphatic: No lymphadenopathy  Psychiatry: AAOx3, mood & affect appropriate  Skin: No rashes, ecchymoses, or cyanosis                          8.3    6.66  )-----------( 232      ( 12 Dec 2019 12:00 )             27.5     12-12    137  |  104  |  18  ----------------------------<  303<H>  4.6   |  21  |  0.8    Ca    8.3<L>      12 Dec 2019 12:00  Mg     1.7     12-12    TPro  4.7<L>  /  Alb  3.0<L>  /  TBili  0.3  /  DBili  x   /  AST  12  /  ALT  10  /  AlkPhos  72  12-12    PT/INR - ( 12 Dec 2019 12:00 )   PT: 11.60 sec;   INR: 1.01 ratio         PTT - ( 13 Dec 2019 05:37 )  PTT:28.6 sec  CARDIAC MARKERS ( 12 Dec 2019 12:00 )  x     / <0.01 ng/mL / 27 U/L / x     / 2.5 ng/mL  CARDIAC MARKERS ( 11 Dec 2019 20:35 )  x     / <0.01 ng/mL / x     / x     / x              Hemoglobin A1C, Whole Blood: 6.0 % (12-13 @ 05:37)      New ECG(s): Personally reviewed    Echo:  < from: Transthoracic Echocardiogram (12.13.19 @ 08:35) >  Summary:   1. Left ventricular ejection fraction, by visual estimation, is 60 to   65%.   2. Normal global left ventricular systolic function.   3. LV Ejection Fraction by Mistry's Method with a biplane EF of 58 %.   4. Mild left ventricular hypertrophy.   5. Mildly increased LV wall thickness.   6. Normal left ventricular internal cavity size.   7. Degenerative mitral valve.   8. Mild mitral valve regurgitation.   9. Mild to moderate mitral annular calcification.  10. Moderate thickening and calcification of the anterior and posterior   mitral valve leaflets.  11. Peak transmitral mean gradient equals 2.3 mmHg, calculated mitral   valve area by pressure half time equals 1.73 cm² consistent with mild   mitral stenosis.  12. Mild-moderate tricuspid regurgitation.  13. Mild aortic regurgitation.  14. Sclerotic aortic valve with normal opening.  15. Estimated pulmonary artery systolic pressure is 47.1 mmHg assuming a   right atrial pressure of 5 mmHg, which is consistent with mild pulmonary   hypertension.  16. LA volume Index is 31.4 ml/m² ml/m2.  17. Mitral valve mean gradient is 2.3 mmHg consistent with mild mitral   stenosis.    < end of copied text >      Stress Testing:     Cath:    Imaging:    Interpretation of Telemetry: Sinus rhythm 60-70 bpm

## 2019-12-13 NOTE — PROGRESS NOTE ADULT - SUBJECTIVE AND OBJECTIVE BOX
SUBJECTIVE:    Patient is a 89y old Female who presents with a chief complaint of Fall     Today is hospital day 2d. This morning she is resting comfortably in bed and reports that she is waiting for her son. She is slightly confused but not agitated     PAST MEDICAL & SURGICAL HISTORY  Atrial fibrillation  High blood cholesterol  Hypertension  Diabetes mellitus, type 2  History of hip surgery    SOCIAL HISTORY:  Negative for smoking/alcohol/drug use.     ALLERGIES:  No Known Drug Allergies  Originally Entered as [RASH] reaction to [cashew nut] (Unknown)    MEDICATIONS:  STANDING MEDICATIONS  chlorhexidine 4% Liquid 1 Application(s) Topical <User Schedule>  dextrose 5%. 1000 milliLiter(s) IV Continuous <Continuous>  dextrose 50% Injectable 12.5 Gram(s) IV Push once  dextrose 50% Injectable 25 Gram(s) IV Push once  dextrose 50% Injectable 25 Gram(s) IV Push once  docusate sodium Oral Tab/Cap - Peds 100 milliGRAM(s) Oral three times a day  insulin glargine Injectable (LANTUS) 10 Unit(s) SubCutaneous at bedtime  insulin lispro (HumaLOG) corrective regimen sliding scale   SubCutaneous three times a day before meals  insulin lispro Injectable (HumaLOG) 4 Unit(s) SubCutaneous three times a day before meals  pantoprazole    Tablet 40 milliGRAM(s) Oral before breakfast  simvastatin 40 milliGRAM(s) Oral at bedtime    PRN MEDICATIONS  acetaminophen   Tablet .. 650 milliGRAM(s) Oral every 6 hours PRN  dextrose 40% Gel 15 Gram(s) Oral once PRN  glucagon  Injectable 1 milliGRAM(s) IntraMuscular once PRN    VITALS:   T(F): 96  HR: 66  BP: 152/83  RR: 30  SpO2: 98%    LABS:                        8.6    6.39  )-----------( 251      ( 13 Dec 2019 11:42 )             28.1     12-13    139  |  102  |  17  ----------------------------<  164<H>  4.9   |  23  |  0.7    Ca    9.1      13 Dec 2019 11:42  Mg     1.8         TPro  5.3<L>  /  Alb  3.3<L>  /  TBili  0.4  /  DBili  x   /  AST  13  /  ALT  12  /  AlkPhos  85  12-13    PT/INR - ( 12 Dec 2019 12:00 )   PT: 11.60 sec;   INR: 1.01 ratio         PTT - ( 13 Dec 2019 05:37 )  PTT:28.6 sec  Urinalysis Basic - ( 12 Dec 2019 23:00 )    Color: Light Yellow / Appearance: Clear / S.022 / pH: x  Gluc: x / Ketone: Negative  / Bili: Negative / Urobili: <2 mg/dL   Blood: x / Protein: Negative / Nitrite: Negative   Leuk Esterase: Moderate / RBC: 2 /HPF / WBC 8 /HPF   Sq Epi: x / Non Sq Epi: 4 /HPF / Bacteria: Many            CARDIAC MARKERS ( 12 Dec 2019 12:00 )  x     / <0.01 ng/mL / 27 U/L / x     / 2.5 ng/mL  CARDIAC MARKERS ( 11 Dec 2019 20:35 )  x     / <0.01 ng/mL / x     / x     / x          PHYSICAL EXAM:  GEN: No acute distress  LUNGS: Decreased breath sounds on the base   HEART: S1/S2 present. RRR.   ABD: Soft, non-tender, non-distended. Bowel sounds present  EXT: moves all extremities   NEURO: AAOX3

## 2019-12-13 NOTE — CHART NOTE - NSCHARTNOTEFT_GEN_A_CORE
88 y/o female w/ a PMH of HTN, A Fib on xerelto, DMT2, DLD, who presented to the ED with a complaint of a fall in which she hit the back of her head. She experienced 4 similar episodes during the past year. CT scan showed extra-calvarial hematoma. Initial EKG shows sinus laura and RBBB. Yesterday 12/12 EKG showed 3rd degree heart block and symptomatic laura. Will upgraded to CCU. Observed in CCU for one day. No events. Stopped Diltiazem CD and Metoprolol. Will downgrade to tele     # Lethargy - Symptomatic Bradycardia 2/2 3rd Degree AV Block vs sinus pause   - Admission EKG showed Left axis deviation and RBBB  - was upgraded to CCU on 12/12 for bradycardia   - was on Diltiazem CD and Metoprolol. now off it. Will monitor   - Xarelto restarted   - pacing pads placed, s/p 0.5 IV atropine x 2 given and  3mg IV Glucagon on 12/12  - EP wants to monitor off Diltiazem CD and Metoprolol. No pacemaker for now   - 60 mg/kg Calcium gluconate push x 1 given  - hemodynamically stable, continue to monitor vitals  - Patient received Amiodarone at [facility]'s office as per EMR records (not in H&P)  - Started on amlodipine 5 mg     # Head Trauma secondary to mechanical fall  - CT Head - Left parietal extracalvarial scalp soft tissue swelling/hematoma. No evidence of underlying fracture.  - CT abdomen pelvis negative  - Status post cholecystectomy. Pneumobilia, new from prior CT.   - CT chest- Right-sided pulmonary nodules. 6-12 follow-up can be performed as clinically warranted.  - Orthostatics negative  - TSH 7.62  - EF 60-65 %.     # A Fib  - Xerelto  - Metoprolol and diltiazem    # HTN  - Amlodipine    # T2DM  - 10/4/4/4  - Sliding scale     # Diet  - DASH    # DVT prophylaxis   - on xerelto    # GI prophylaxis   - protonix    # FULL CODE       TO FOLLOW:  1) EP for further recs regarding pacemaker and A Fib meds

## 2019-12-13 NOTE — PROGRESS NOTE ADULT - ASSESSMENT
Patient is a 89y old  Female who presents with a chief complaint of Fall (12 Dec 2019 18:22)    Post fall/bradycardia: AV block most likely caused by large doses of beta and calcium  blockers  Paroxysmal atrial fibrillation    Recommendations:  Downgrade to telemetry  Monitor electrolytes  Keep holding CCB and BB  f/u TSH

## 2019-12-13 NOTE — PROGRESS NOTE ADULT - ASSESSMENT
88 y/o female w/ a PMH of HTN, A Fib on xerelto, DMT2, DLD, who presented to the ED with a complaint of a fall in which she hit the back of her head. She experienced 4 similar episodes during the past year.     Lethargy - Symptomatic Bradycardia 2/2 3rd Degree AV Block vs sinus pause   - Admission EKG showed Left axis deviation and RBBB  - was upgraded to CCU on 12/12 for bradycardia   - was on Diltiazem CD and Metoprolol. now off it. Will monitor   - Xarelto stopped as per Cardio fellow. Awaiting further recs from cardiology for restarting   - pacing pads placed, s/p 0.5 IV atropine x 2 given and  3mg IV Glucagon on 12/12  - EP wants to monitor off Diltiazem CD and Metoprolol. No pacemaker for now   - 60 mg/kg Calcium gluconate push x 1 given  - hemodynamically stable, continue to monitor vitals  - Patient received Amiodarone at [facility]'s office as per EMR records (not in H&P)  - Started on amlodipine 5 mg     # Head Trauma secondary to mechanical fall  - CT Head - Left parietal extracalvarial scalp soft tissue swelling/hematoma. No evidence of underlying fracture.  - CT abdomen pelvis negative  - Status post cholecystectomy. Pneumobilia, new from prior CT.   - CT chest- Right-sided pulmonary nodules. 6-12 follow-up can be performed as clinically warranted.  - Orthostatics negative  - TSH 7.62  - EF 60-65 %.   - Pending cardio input to restart xeralto in light of frequent falls and head trauma    # A Fib  - Xerelto  - Metoprolol and diltiazem  - on hold    # HTN  - holding meds    # T2DM  - 10/4/4/4  - Sliding scale     # Diet  - DASH    # DVT prophylaxis   - on xerelto    # GI prophylaxis   - protonix    # FULL CODE

## 2019-12-14 LAB
ALBUMIN SERPL ELPH-MCNC: 3.1 G/DL — LOW (ref 3.5–5.2)
ALP SERPL-CCNC: 85 U/L — SIGNIFICANT CHANGE UP (ref 30–115)
ALT FLD-CCNC: 12 U/L — SIGNIFICANT CHANGE UP (ref 0–41)
ANION GAP SERPL CALC-SCNC: 16 MMOL/L — HIGH (ref 7–14)
AST SERPL-CCNC: 15 U/L — SIGNIFICANT CHANGE UP (ref 0–41)
BASOPHILS # BLD AUTO: 0.01 K/UL — SIGNIFICANT CHANGE UP (ref 0–0.2)
BASOPHILS NFR BLD AUTO: 0.2 % — SIGNIFICANT CHANGE UP (ref 0–1)
BILIRUB SERPL-MCNC: 0.5 MG/DL — SIGNIFICANT CHANGE UP (ref 0.2–1.2)
BUN SERPL-MCNC: 18 MG/DL — SIGNIFICANT CHANGE UP (ref 10–20)
CALCIUM SERPL-MCNC: 8.9 MG/DL — SIGNIFICANT CHANGE UP (ref 8.5–10.1)
CHLORIDE SERPL-SCNC: 104 MMOL/L — SIGNIFICANT CHANGE UP (ref 98–110)
CO2 SERPL-SCNC: 20 MMOL/L — SIGNIFICANT CHANGE UP (ref 17–32)
CREAT SERPL-MCNC: 0.7 MG/DL — SIGNIFICANT CHANGE UP (ref 0.7–1.5)
EOSINOPHIL # BLD AUTO: 0.01 K/UL — SIGNIFICANT CHANGE UP (ref 0–0.7)
EOSINOPHIL NFR BLD AUTO: 0.2 % — SIGNIFICANT CHANGE UP (ref 0–8)
GLUCOSE BLDC GLUCOMTR-MCNC: 131 MG/DL — HIGH (ref 70–99)
GLUCOSE BLDC GLUCOMTR-MCNC: 134 MG/DL — HIGH (ref 70–99)
GLUCOSE BLDC GLUCOMTR-MCNC: 158 MG/DL — HIGH (ref 70–99)
GLUCOSE BLDC GLUCOMTR-MCNC: 206 MG/DL — HIGH (ref 70–99)
GLUCOSE SERPL-MCNC: 139 MG/DL — HIGH (ref 70–99)
HCT VFR BLD CALC: 28.3 % — LOW (ref 37–47)
HGB BLD-MCNC: 8.7 G/DL — LOW (ref 12–16)
IMM GRANULOCYTES NFR BLD AUTO: 0.5 % — HIGH (ref 0.1–0.3)
LYMPHOCYTES # BLD AUTO: 0.61 K/UL — LOW (ref 1.2–3.4)
LYMPHOCYTES # BLD AUTO: 10.4 % — LOW (ref 20.5–51.1)
MAGNESIUM SERPL-MCNC: 1.7 MG/DL — LOW (ref 1.8–2.4)
MCHC RBC-ENTMCNC: 25.1 PG — LOW (ref 27–31)
MCHC RBC-ENTMCNC: 30.7 G/DL — LOW (ref 32–37)
MCV RBC AUTO: 81.6 FL — SIGNIFICANT CHANGE UP (ref 81–99)
MONOCYTES # BLD AUTO: 0.58 K/UL — SIGNIFICANT CHANGE UP (ref 0.1–0.6)
MONOCYTES NFR BLD AUTO: 9.8 % — HIGH (ref 1.7–9.3)
NEUTROPHILS # BLD AUTO: 4.65 K/UL — SIGNIFICANT CHANGE UP (ref 1.4–6.5)
NEUTROPHILS NFR BLD AUTO: 78.9 % — HIGH (ref 42.2–75.2)
NRBC # BLD: 0 /100 WBCS — SIGNIFICANT CHANGE UP (ref 0–0)
PLATELET # BLD AUTO: 259 K/UL — SIGNIFICANT CHANGE UP (ref 130–400)
POTASSIUM SERPL-MCNC: 4.3 MMOL/L — SIGNIFICANT CHANGE UP (ref 3.5–5)
POTASSIUM SERPL-SCNC: 4.3 MMOL/L — SIGNIFICANT CHANGE UP (ref 3.5–5)
PROT SERPL-MCNC: 5.2 G/DL — LOW (ref 6–8)
RBC # BLD: 3.47 M/UL — LOW (ref 4.2–5.4)
RBC # FLD: 17.2 % — HIGH (ref 11.5–14.5)
SODIUM SERPL-SCNC: 140 MMOL/L — SIGNIFICANT CHANGE UP (ref 135–146)
TROPONIN T SERPL-MCNC: <0.01 NG/ML — SIGNIFICANT CHANGE UP
TSH SERPL-MCNC: 5.31 UIU/ML — HIGH (ref 0.27–4.2)
WBC # BLD: 5.89 K/UL — SIGNIFICANT CHANGE UP (ref 4.8–10.8)
WBC # FLD AUTO: 5.89 K/UL — SIGNIFICANT CHANGE UP (ref 4.8–10.8)

## 2019-12-14 PROCEDURE — 93010 ELECTROCARDIOGRAM REPORT: CPT

## 2019-12-14 PROCEDURE — 74018 RADEX ABDOMEN 1 VIEW: CPT | Mod: 26

## 2019-12-14 RX ORDER — METOPROLOL TARTRATE 50 MG
25 TABLET ORAL
Refills: 0 | Status: DISCONTINUED | OUTPATIENT
Start: 2019-12-14 | End: 2019-12-16

## 2019-12-14 RX ORDER — QUETIAPINE FUMARATE 200 MG/1
25 TABLET, FILM COATED ORAL ONCE
Refills: 0 | Status: COMPLETED | OUTPATIENT
Start: 2019-12-14 | End: 2019-12-14

## 2019-12-14 RX ORDER — POLYETHYLENE GLYCOL 3350 17 G/17G
17 POWDER, FOR SOLUTION ORAL
Refills: 0 | Status: DISCONTINUED | OUTPATIENT
Start: 2019-12-14 | End: 2019-12-18

## 2019-12-14 RX ORDER — MAGNESIUM SULFATE 500 MG/ML
2 VIAL (ML) INJECTION ONCE
Refills: 0 | Status: COMPLETED | OUTPATIENT
Start: 2019-12-14 | End: 2019-12-14

## 2019-12-14 RX ADMIN — Medication 100 MILLIGRAM(S): at 22:07

## 2019-12-14 RX ADMIN — Medication 100 MILLIGRAM(S): at 14:02

## 2019-12-14 RX ADMIN — PANTOPRAZOLE SODIUM 40 MILLIGRAM(S): 20 TABLET, DELAYED RELEASE ORAL at 05:19

## 2019-12-14 RX ADMIN — INSULIN GLARGINE 10 UNIT(S): 100 INJECTION, SOLUTION SUBCUTANEOUS at 22:07

## 2019-12-14 RX ADMIN — AMLODIPINE BESYLATE 5 MILLIGRAM(S): 2.5 TABLET ORAL at 05:19

## 2019-12-14 RX ADMIN — Medication 100 MILLIGRAM(S): at 05:19

## 2019-12-14 RX ADMIN — Medication 50 GRAM(S): at 19:22

## 2019-12-14 RX ADMIN — CHLORHEXIDINE GLUCONATE 1 APPLICATION(S): 213 SOLUTION TOPICAL at 05:19

## 2019-12-14 RX ADMIN — ATORVASTATIN CALCIUM 20 MILLIGRAM(S): 80 TABLET, FILM COATED ORAL at 22:07

## 2019-12-14 RX ADMIN — Medication 25 MILLIGRAM(S): at 19:21

## 2019-12-14 NOTE — PROGRESS NOTE ADULT - SUBJECTIVE AND OBJECTIVE BOX
LENGTH OF HOSPITAL STAY: 3d      CHIEF COMPLAINT:   Patient is a 89y old  Female who presents with a chief complaint of Fall (14 Dec 2019 12:24)      OVER Past 24hrs:  The patient was seen and examined at bedside there were no acute events during the night. On tele  no bradycardia. AAOx2-3 at bedside.      PAST MEDICAL & SURGICAL HISTORY  PAST MEDICAL & SURGICAL HISTORY:  Atrial fibrillation  High blood cholesterol  Hypertension  Diabetes mellitus, type 2  History of hip surgery        REVIEW OF SYSTEMS  CONSTITUTIONAL: No fever  RESPIRATORY: No cough, wheezing, chills or hemoptysis; No shortness of breath  CARDIOVASCULAR: No chest pain, palpitations, dizziness, or leg swelling  GASTROINTESTINAL: No abdominal or epigastric pain. No nausea, vomiting, or hematemesis; complains constipation.  GENITOURINARY: No dysuria, frequency, hematuria, or incontinence  NEUROLOGICAL: No headaches, memory loss, loss of strength, numbness  SKIN: No itching, burning, rashes, or lesions   MUSCULOSKELETAL: No joint pain or swelling; No muscle, back, or extremity pain  PSYCHIATRIC: No depression, anxiety      ALLERGIES:  No Known Drug Allergies  Originally Entered as [RASH] reaction to [cashew nut] (Unknown)    MEDICATIONS:  STANDING MEDICATIONS  amLODIPine   Tablet 5 milliGRAM(s) Oral daily  atorvastatin 20 milliGRAM(s) Oral at bedtime  chlorhexidine 4% Liquid 1 Application(s) Topical <User Schedule>  dextrose 5%. 1000 milliLiter(s) IV Continuous <Continuous>  dextrose 50% Injectable 12.5 Gram(s) IV Push once  dextrose 50% Injectable 25 Gram(s) IV Push once  dextrose 50% Injectable 25 Gram(s) IV Push once  docusate sodium Oral Tab/Cap - Peds 100 milliGRAM(s) Oral three times a day  insulin glargine Injectable (LANTUS) 10 Unit(s) SubCutaneous at bedtime  insulin lispro (HumaLOG) corrective regimen sliding scale   SubCutaneous three times a day before meals  insulin lispro Injectable (HumaLOG) 4 Unit(s) SubCutaneous three times a day before meals  metoprolol tartrate 25 milliGRAM(s) Oral two times a day  pantoprazole    Tablet 40 milliGRAM(s) Oral before breakfast  rivaroxaban 20 milliGRAM(s) Oral with dinner    PRN MEDICATIONS  acetaminophen   Tablet .. 650 milliGRAM(s) Oral every 6 hours PRN  dextrose 40% Gel 15 Gram(s) Oral once PRN  glucagon  Injectable 1 milliGRAM(s) IntraMuscular once PRN    VITALS:   T(F): 96.8  HR: 101  BP: 135/62  RR: 19  SpO2: --    PHYSICAL EXAM:  General: No acute distress.  Alert, oriented, interactive, nonfocal. Elderly  Female     HEENT: Pupils equal, reactive to light symmetrically.    PULM: Clear to auscultation bilaterally, no significant sputum production.    CVS: Regular rate and rhythm, no murmurs, rubs, or gallops.    GI: Soft, nondistended, mild tender, no masses.    MSK: No edema, nontender.    SKIN: Warm and well perfused, no rashes noted.    PSYCH: AAOx2-3    NEURO: NON focal     LABS:                        8.7    5.89  )-----------( 259      ( 14 Dec 2019 06:04 )             28.3         140  |  104  |  18  ----------------------------<  139<H>  4.3   |  20  |  0.7    Ca    8.9      14 Dec 2019 06:04  Mg     1.7         TPro  5.2<L>  /  Alb  3.1<L>  /  TBili  0.5  /  DBili  x   /  AST  15  /  ALT  12  /  AlkPhos  85      PTT - ( 13 Dec 2019 05:37 )  PTT:28.6 sec  Urinalysis Basic - ( 13 Dec 2019 13:00 )    Color: Yellow / Appearance: Slightly Turbid / S.020 / pH: x  Gluc: x / Ketone: Negative  / Bili: Negative / Urobili: <2 mg/dL   Blood: x / Protein: Negative / Nitrite: Negative   Leuk Esterase: Moderate / RBC: 116 /HPF / WBC 21 /HPF   Sq Epi: x / Non Sq Epi: 1 /HPF / Bacteria: Many        Troponin T, Serum: <0.01 ng/mL (19 @ 06:04)      CARDIAC MARKERS ( 14 Dec 2019 06:04 )  x     / <0.01 ng/mL / x     / x     / x          RADIOLOGY:  < from: CT Abdomen and Pelvis w/ IV Cont (19 @ 10:47) >  IMPRESSION:   1.  No CT evidence for acute intrathoracic or abdominopelvic traumatic   injury.  2.  Status post cholecystectomy. Pneumobilia, new from prior CT.   Correlate for history of prior biliary intervention/sphincterotomy.   3.  Additional findings in the body of the report.  4.  Right-sided pulmonary nodules. 6-12 follow-up can be performed as   clinically warranted.    < end of copied text >        < from: CT Chest w/ IV Cont (19 @ 10:47) >    IMPRESSION:   1.  No CT evidence for acute intrathoracic or abdominopelvic traumatic   injury.  2.  Status post cholecystectomy. Pneumobilia, new from prior CT.   Correlate for history of prior biliary intervention/sphincterotomy.   3.  Additional findings in the body of the report.  4.  Right-sided pulmonary nodules. 6-12 follow-up can be performed as   clinically warranted.    < end of copied text >    < from: 12 Lead ECG (19 @ 09:50) >  Ventricular Rate 92 BPM    Atrial Rate 92 BPM    P-R Interval 176 ms    QRS Duration 134 ms    Q-T Interval 406 ms    QTC Calculation(Bezet) 502 ms    P Axis 72 degrees    R Axis -37 degrees    T Axis -26 degrees    Diagnosis Line Normal sinus rhythm  Left axis deviation  Right bundle branch block  Abnormal ECG    < end of copied text >  < from: CT Head No Cont (19 @ 10:33) >    IMPRESSION:     1.  No evidence ofacute intracranial pathology. Stable exam since   10/13/2019.    2.  Stable severe chronic microvascular changes and chronic lacunar   infarcts.    3.  Left parietal extracalvarial scalp soft tissue swelling/hematoma. No   evidence of underlying fracture.    < end of copied text >

## 2019-12-14 NOTE — PROGRESS NOTE ADULT - SUBJECTIVE AND OBJECTIVE BOX
SUBJ:No chest pain or shortness of breath      MEDICATIONS  (STANDING):  amLODIPine   Tablet 5 milliGRAM(s) Oral daily  atorvastatin 20 milliGRAM(s) Oral at bedtime  chlorhexidine 4% Liquid 1 Application(s) Topical <User Schedule>  dextrose 5%. 1000 milliLiter(s) (50 mL/Hr) IV Continuous <Continuous>  dextrose 50% Injectable 12.5 Gram(s) IV Push once  dextrose 50% Injectable 25 Gram(s) IV Push once  dextrose 50% Injectable 25 Gram(s) IV Push once  docusate sodium Oral Tab/Cap - Peds 100 milliGRAM(s) Oral three times a day  insulin glargine Injectable (LANTUS) 10 Unit(s) SubCutaneous at bedtime  insulin lispro (HumaLOG) corrective regimen sliding scale   SubCutaneous three times a day before meals  insulin lispro Injectable (HumaLOG) 4 Unit(s) SubCutaneous three times a day before meals  pantoprazole    Tablet 40 milliGRAM(s) Oral before breakfast  rivaroxaban 20 milliGRAM(s) Oral with dinner    MEDICATIONS  (PRN):  acetaminophen   Tablet .. 650 milliGRAM(s) Oral every 6 hours PRN Mild Pain (1 - 3)  dextrose 40% Gel 15 Gram(s) Oral once PRN Blood Glucose LESS THAN 70 milliGRAM(s)/deciliter  glucagon  Injectable 1 milliGRAM(s) IntraMuscular once PRN Glucose LESS THAN 70 milligrams/deciliter            Vital Signs Last 24 Hrs  T(C): 36.2 (14 Dec 2019 04:58), Max: 36.2 (14 Dec 2019 04:58)  T(F): 97.1 (14 Dec 2019 04:58), Max: 97.1 (14 Dec 2019 04:58)  HR: 83 (14 Dec 2019 04:58) (72 - 97)  BP: 124/56 (14 Dec 2019 04:58) (124/56 - 176/70)  BP(mean): 97 (13 Dec 2019 15:18) (97 - 100)  RR: 20 (14 Dec 2019 04:58) (20 - 33)  SpO2: --      ECG:NML    TTE:    LABS:                        8.7    5.89  )-----------( 259      ( 14 Dec 2019 06:04 )             28.3     12-14    140  |  104  |  18  ----------------------------<  139<H>  4.3   |  20  |  0.7    Ca    8.9      14 Dec 2019 06:04  Mg     1.7     12-14    TPro  5.2<L>  /  Alb  3.1<L>  /  TBili  0.5  /  DBili  x   /  AST  15  /  ALT  12  /  AlkPhos  85  12-14    CARDIAC MARKERS ( 14 Dec 2019 06:04 )  x     / <0.01 ng/mL / x     / x     / x          PTT - ( 13 Dec 2019 05:37 )  PTT:28.6 sec    I&O's Summary    13 Dec 2019 07:01  -  14 Dec 2019 07:00  --------------------------------------------------------  IN: 290 mL / OUT: 500 mL / NET: -210 mL    14 Dec 2019 07:01  -  14 Dec 2019 12:24  --------------------------------------------------------  IN: 275 mL / OUT: 350 mL / NET: -75 mL      BNP

## 2019-12-14 NOTE — PROGRESS NOTE ADULT - ASSESSMENT
IMPRESSION  Symptomatic Bradycardia Medication Induced   Incidental finding on CT a/p cholecystectomy. Pneumobilia- afebrile   S/p Rapid response lethargy   HO Afib    Mild delirium   Multiple pulmonary nodules - OP follow up  HO DM  HO HTN  HO DLD        Plan  Symptomatic Bradycardia Medication Induced   - DC Cardizem  no Amiodarone  - EP notes beata  - Restart 25mg metoprolol BID   - No bradycardic events on tele on tele  - replete electrolytes as needed    Incidental finding on CT a/p cholecystectomy. Pneumobilia- afebrile   - mildly tender abdomen   - f/u KUB   - adding bowel regimen  as patient complains of constipation       S/p Rapid response lethargy - resolved     HO Afib    - resumed Xarelto     Status post fall-Head Trauma secondary to mechanical fall  - CT Head - Left parietal extracalvarial scalp soft tissue swelling/hematoma. No evidence of underlying fracture.  - trauma w/u no fractures noted   - likely secondary to bradycardia   - Pt/ rehab   - fall precautions       Mild delirium improved  today   - f/u UA  - spoke with family about visiting more frequently          HO DM controlled   insulin regimen   POCT Blood Glucose.: 158 (12-14-19 @ 11:50)  POCT Blood Glucose.: 134 (12-14-19 @ 09:01)  POCT Blood Glucose.: 194 (12-13-19 @ 21:57)  POCT Blood Glucose.: 243 (12-13-19 @ 17:30)  POCT Blood Glucose.: 170 (12-13-19 @ 12:01)  POCT Blood Glucose.: 146 (12-13-19 @ 07:47)  POCT Blood Glucose.: 184 (12-12-19 @ 23:48)  POCT Blood Glucose.: 68 (12-12-19 @ 22:23)    HO HTN  - amlodipine     HO DLD  - statin         Electrolyte Imbalances: Correct as needed  []  Hyponatremia   /   Hypernatremia  []   []  Hypokalemia   /   Hyperkalemia  []   []  Hypochlorhydria   /    Hypochlorhydria  []   []  Hypomagnesemia   /   Hypermagnesemia  []   []  Hypophosphatemia   /   Hyperphosphatemia  []       GI ppx:                                   [] Not indicated   /   [x] Pantoprazole 40mg PO Daily    DVT ppx: Xarelto  [] Not indicated / [] Heparin 5000mg SubQ / [] Lovenox 40mg SubQ / [] SCDs    Fluids:   [x] PO  |  [] IVF    Activity:  [X] Assisatnce needed   [X] Increase as Tolerated  /  [] OOB w/ assist  /  [] Bed Rest    BMI:  Height (cm): 167.6 (12-12)  Weight (kg): 69.9 (12-12)  BMI (kg/m2): 24.9 (12-12)        DISPO:  Patient to be discharged when condition(s) optimized.  [x] From Home     [ ] NH/SNF   [ ] 4A Rehab  [ ] Detox Clinic  [X] Plan Discussion with patient and/or family.  [X] Discussed Case and Plan with the Medical Attending.    CODE STATUS  [X] FULL   /    [] DNR IMPRESSION  Symptomatic Bradycardia Medication Induced   Incidental finding on CT a/p cholecystectomy. Pneumobilia- afebrile   S/p Rapid response lethargy   HO Afib    Mild delirium   Multiple pulmonary nodules - OP follow up  HO DM  HO HTN  HO DLD        Plan  Symptomatic Bradycardia Medication Induced   - DC Cardizem  no Amiodarone  - EP notes beata  - Restart 25mg metoprolol BID   - No bradycardic events on tele on tele  - replete electrolytes as needed    Incidental finding on CT a/p cholecystectomy. Pneumobilia- afebrile   - mildly tender abdomen   - f/u KUB   - adding bowel regimen  as patient complains of constipation       S/p Rapid response lethargy - resolved     HO Afib    - resumed Xarelto     Status post fall-Head Trauma secondary to mechanical fall  - CT Head - Left parietal extracalvarial scalp soft tissue swelling/hematoma. No evidence of underlying fracture.  - trauma w/u no fractures noted   - likely secondary to bradycardia   - Pt/ rehab   - fall precautions   - Consider repeating if Mental status worsens       Mild delirium improved  today   - f/u UA  - spoke with family about visiting more frequently          HO DM controlled   insulin regimen   POCT Blood Glucose.: 158 (12-14-19 @ 11:50)  POCT Blood Glucose.: 134 (12-14-19 @ 09:01)  POCT Blood Glucose.: 194 (12-13-19 @ 21:57)  POCT Blood Glucose.: 243 (12-13-19 @ 17:30)  POCT Blood Glucose.: 170 (12-13-19 @ 12:01)  POCT Blood Glucose.: 146 (12-13-19 @ 07:47)  POCT Blood Glucose.: 184 (12-12-19 @ 23:48)  POCT Blood Glucose.: 68 (12-12-19 @ 22:23)    HO HTN  - amlodipine     HO DLD  - statin         Electrolyte Imbalances: Correct as needed  []  Hyponatremia   /   Hypernatremia  []   []  Hypokalemia   /   Hyperkalemia  []   []  Hypochlorhydria   /    Hypochlorhydria  []   []  Hypomagnesemia   /   Hypermagnesemia  []   []  Hypophosphatemia   /   Hyperphosphatemia  []       GI ppx:                                   [] Not indicated   /   [x] Pantoprazole 40mg PO Daily    DVT ppx: Xarelto  [] Not indicated / [] Heparin 5000mg SubQ / [] Lovenox 40mg SubQ / [] SCDs    Fluids:   [x] PO  |  [] IVF    Activity:  [X] Assisatnce needed   [X] Increase as Tolerated  /  [] OOB w/ assist  /  [] Bed Rest    BMI:  Height (cm): 167.6 (12-12)  Weight (kg): 69.9 (12-12)  BMI (kg/m2): 24.9 (12-12)        DISPO:  Patient to be discharged when condition(s) optimized.  [x] From Home     [ ] NH/SNF   [ ] 4A Rehab  [ ] Detox Clinic  [X] Plan Discussion with patient and/or family.  [X] Discussed Case and Plan with the Medical Attending.    CODE STATUS  [X] FULL   /    [] DNR

## 2019-12-14 NOTE — PROGRESS NOTE ADULT - ASSESSMENT
SP FALL at home and trauma to the L posterior head  Bradyarrhythmia, sp RR 11/12  Hx of HTN, ASHD, CAD, afib, Xarelto  Hx of DLD  Hx of DM II  Hx of OA, DDD, DJD, mobility dysfunction, frequent falls    pt was cleared by trauma SVC  Ct of head and C spine and abd and pelvis show no acute pathology, pt has L posterior scalp hematoma and stable lacunar infarcts and white matter changes  pt is sp RR 12/12 for bradyarrhythmia, sp short stay in CCU, being downgraded to tele today  cardiac meds being adjusted:  cont to hold BB and CCB  heart rate jagjit monitored, no PPM as of yet  Cardio ff up and card appreciated  EPS appreciated\  monitor and correct any electrolyte abnormality

## 2019-12-14 NOTE — PROGRESS NOTE ADULT - SUBJECTIVE AND OBJECTIVE BOX
brought to the ER after fall at home.  The pt was walking with walker and fell backward hitting back of head, helped up by son and brought to the ER as a trauma level.  The pt states she hit the back of her head but denied CP, palp, SOB or LOC.  Pt had 4 similar falls at home..  In the ER CT of head, C spine chest and abd and pelvis was negative for any acute pathology.  The pt is transferred to medicine for further evaluation and care.  The PMHX includes:  HTN, aSHD, , afib on Xarelto, DM II, DLD, OA, DDD, DJD, hip surgery, mobility dysfunction, + walker dependent.    INTERVAL HPI/OVERNIGHT EVENTS: pt is off BB and CCB, ff by cardio and EPS, downgraded from CCU to tele, cont monitoring, offers no complaints    MEDICATIONS  (STANDING):  dextrose 5%. 1000 milliLiter(s) (50 mL/Hr) IV Continuous <Continuous>  dextrose 50% Injectable 12.5 Gram(s) IV Push once  dextrose 50% Injectable 25 Gram(s) IV Push once  dextrose 50% Injectable 25 Gram(s) IV Push once  docusate sodium Oral Tab/Cap - Peds 100 milliGRAM(s) Oral three times a day  insulin glargine Injectable (LANTUS) 10 Unit(s) SubCutaneous at bedtime  insulin lispro (HumaLOG) corrective regimen sliding scale   SubCutaneous three times a day before meals  insulin lispro Injectable (HumaLOG) 4 Unit(s) SubCutaneous three times a day before meals  magnesium oxide 400 milliGRAM(s) Oral once  magnesium sulfate  IVPB 2 Gram(s) IV Intermittent once  pantoprazole    Tablet 40 milliGRAM(s) Oral before breakfast  simvastatin 40 milliGRAM(s) Oral at bedtime    MEDICATIONS  (PRN):  acetaminophen   Tablet .. 650 milliGRAM(s) Oral every 6 hours PRN Mild Pain (1 - 3)  dextrose 40% Gel 15 Gram(s) Oral once PRN Blood Glucose LESS THAN 70 milliGRAM(s)/deciliter  glucagon  Injectable 1 milliGRAM(s) IntraMuscular once PRN Glucose LESS THAN 70 milligrams/deciliter      Allergies    No Known Drug Allergies  Originally Entered as [RASH] reaction to [cashew nut] (Unknown)    Vital Signs Last 24 Hrs    T(F): 96.8  HR: 101  BP: 135/62    RR: 19  SpO2: --    PHYSICAL EXAM:      Constitutional: pt alert, oriented but at times confused, NAD    Eyes:  nonicteric    ENMT: dry oral mucosa, dental defects    Neck:  supple, no JVD, no bruits    Back: TH kyphosis    Respiratory:  shallow respirations, scattered rhonchi, no wheezing, crackles or rales    Cardiovascular:  S1S2 + external pads    Gastrointestinal:  globose, soft and benign    Genitourinary:  no thomas    Extremities:+ arthritic changes, moves all ext    Vascular:  dec pedal pulses    Neurological:  nonfocal    Skin: posterior scalp hematoma    Lymph Nodes:  not enlarged    LABS:                        8.7    5.8  )-----------( 259             28    1340 |  104  |  18  ----------------------------<  139  4.3   |  20  |  0.7  GFR 72  Ca    8.3<L>        Mg     1.7     12-12  troponin < 0.01  TPro  4.7<L>  /  Alb  3.0<L>  /  TBili  0.3  /  DBili  x   /  AST  12  /  ALT  10  /  AlkPhos  72  12-12    PT/INR - ( 12 Dec 2019 12:00 )   PT: 11.60 sec;   INR: 1.01 ratio         PTT - ( 11 Dec 2019 10:00 )  PTT:32.1 sec      RADIOLOGY & ADDITIONAL TESTS:  CTH:  atrophy, severe microvascular white matter changes, stable lacunar infarcts, L scalp hematoma, calvarium intact    CT of C spine:  multi level DDD, nofx or dislocation  CT of chest and abd:  in summary no acute pathology    EK/14 NSR 92/min, L axis, RBBB    ECHO:  EF  60-65%, mild LVH, mild inc in wall thickness, mild MR/MS, thickening of posterior leaflet. mild-mod TR, mild AI sclerotic aortic valve brought to the ER after fall at home.  The pt was walking with walker and fell backward hitting back of head, helped up by son and brought to the ER as a trauma level.  The pt states she hit the back of her head but denied CP, palp, SOB or LOC.  Pt had 4 similar falls at home..  In the ER CT of head, C spine chest and abd and pelvis was negative for any acute pathology.  The pt is transferred to medicine for further evaluation and care.  The PMHX includes:  HTN, aSHD, , afib on Xarelto, DM II, DLD, OA, DDD, DJD, hip surgery, mobility dysfunction, + walker dependent.    INTERVAL HPI/OVERNIGHT EVENTS: pt is offCCB,  resumed  BB at 25mg BID dueto in HR,  and BP, ff by cardio and EPS, downgraded from CCU to tele, cont monitoring, offers no complaints    MEDICATIONS  (STANDING):  dextrose 5%. 1000 milliLiter(s) (50 mL/Hr) IV Continuous <Continuous>  dextrose 50% Injectable 12.5 Gram(s) IV Push once  dextrose 50% Injectable 25 Gram(s) IV Push once  dextrose 50% Injectable 25 Gram(s) IV Push once  docusate sodium Oral Tab/Cap - Peds 100 milliGRAM(s) Oral three times a day  insulin glargine Injectable (LANTUS) 10 Unit(s) SubCutaneous at bedtime  insulin lispro (HumaLOG) corrective regimen sliding scale   SubCutaneous three times a day before meals  insulin lispro Injectable (HumaLOG) 4 Unit(s) SubCutaneous three times a day before meals  magnesium oxide 400 milliGRAM(s) Oral once  magnesium sulfate  IVPB 2 Gram(s) IV Intermittent once  pantoprazole    Tablet 40 milliGRAM(s) Oral before breakfast  simvastatin 40 milliGRAM(s) Oral at bedtime  metoprolol 25mg  q12    MEDICATIONS  (PRN):  acetaminophen   Tablet .. 650 milliGRAM(s) Oral every 6 hours PRN Mild Pain (1 - 3)  dextrose 40% Gel 15 Gram(s) Oral once PRN Blood Glucose LESS THAN 70 milliGRAM(s)/deciliter  glucagon  Injectable 1 milliGRAM(s) IntraMuscular once PRN Glucose LESS THAN 70 milligrams/deciliter      Allergies    No Known Drug Allergies  Originally Entered as [RASH] reaction to [cashew nut] (Unknown)    Vital Signs Last 24 Hrs    T(F): 96.8  HR: 101  BP: 135/62    RR: 19  SpO2: --    PHYSICAL EXAM:      Constitutional: pt alert, oriented but at times confused, NAD    Eyes:  nonicteric    ENMT: dry oral mucosa, dental defects    Neck:  supple, no JVD, no bruits    Back: TH kyphosis    Respiratory:  shallow respirations, scattered rhonchi, no wheezing, crackles or rales    Cardiovascular:  S1S2 + external pads    Gastrointestinal:  globose, soft and benign    Genitourinary:  no thomas    Extremities:+ arthritic changes, moves all ext    Vascular:  dec pedal pulses    Neurological:  nonfocal    Skin: posterior scalp hematoma    Lymph Nodes:  not enlarged    LABS:                        8.7    5.8  )-----------( 259             28    140 |  104  |  18  ----------------------------<  139  4.3   |  20  |  0.7  GFR 72  Ca    8.3<L>        Mg     1.7     12-12  troponin < 0.01  TPro  4.7<L>  /  Alb  3.0<L>  /  TBili  0.3  /  DBili  x   /  AST  12  /  ALT  10  /  AlkPhos  72  12-12    PT/INR - ( 12 Dec 2019 12:00 )   PT: 11.60 sec;   INR: 1.01 ratio         PTT - ( 11 Dec 2019 10:00 )  PTT:32.1 sec      RADIOLOGY & ADDITIONAL TESTS:  CTH:  atrophy, severe microvascular white matter changes, stable lacunar infarcts, L scalp hematoma, calvarium intact    CT of C spine:  multi level DDD, nofx or dislocation  CT of chest and abd:  in summary no acute pathology    EK/14 NSR 92/min, L axis, RBBB    ECHO:  EF  60-65%, mild LVH, mild inc in wall thickness, mild MR/MS, thickening of posterior leaflet. mild-mod TR, mild AI sclerotic aortic valve

## 2019-12-15 LAB
ANION GAP SERPL CALC-SCNC: 13 MMOL/L — SIGNIFICANT CHANGE UP (ref 7–14)
ANISOCYTOSIS BLD QL: SLIGHT — SIGNIFICANT CHANGE UP
BASOPHILS # BLD AUTO: 0 K/UL — SIGNIFICANT CHANGE UP (ref 0–0.2)
BASOPHILS NFR BLD AUTO: 0 % — SIGNIFICANT CHANGE UP (ref 0–1)
BUN SERPL-MCNC: 18 MG/DL — SIGNIFICANT CHANGE UP (ref 10–20)
CALCIUM SERPL-MCNC: 8.8 MG/DL — SIGNIFICANT CHANGE UP (ref 8.5–10.1)
CHLORIDE SERPL-SCNC: 103 MMOL/L — SIGNIFICANT CHANGE UP (ref 98–110)
CO2 SERPL-SCNC: 25 MMOL/L — SIGNIFICANT CHANGE UP (ref 17–32)
CREAT SERPL-MCNC: 0.8 MG/DL — SIGNIFICANT CHANGE UP (ref 0.7–1.5)
EOSINOPHIL # BLD AUTO: 0 K/UL — SIGNIFICANT CHANGE UP (ref 0–0.7)
EOSINOPHIL NFR BLD AUTO: 0 % — SIGNIFICANT CHANGE UP (ref 0–8)
GIANT PLATELETS BLD QL SMEAR: PRESENT — SIGNIFICANT CHANGE UP
GLUCOSE BLDC GLUCOMTR-MCNC: 104 MG/DL — HIGH (ref 70–99)
GLUCOSE BLDC GLUCOMTR-MCNC: 109 MG/DL — HIGH (ref 70–99)
GLUCOSE BLDC GLUCOMTR-MCNC: 162 MG/DL — HIGH (ref 70–99)
GLUCOSE BLDC GLUCOMTR-MCNC: 268 MG/DL — HIGH (ref 70–99)
GLUCOSE SERPL-MCNC: 105 MG/DL — HIGH (ref 70–99)
HCT VFR BLD CALC: 29.4 % — LOW (ref 37–47)
HGB BLD-MCNC: 8.9 G/DL — LOW (ref 12–16)
HYPOCHROMIA BLD QL: SLIGHT — SIGNIFICANT CHANGE UP
LYMPHOCYTES # BLD AUTO: 0.57 K/UL — LOW (ref 1.2–3.4)
LYMPHOCYTES # BLD AUTO: 7.3 % — LOW (ref 20.5–51.1)
MAGNESIUM SERPL-MCNC: 2.3 MG/DL — SIGNIFICANT CHANGE UP (ref 1.8–2.4)
MANUAL SMEAR VERIFICATION: SIGNIFICANT CHANGE UP
MCHC RBC-ENTMCNC: 25.4 PG — LOW (ref 27–31)
MCHC RBC-ENTMCNC: 30.3 G/DL — LOW (ref 32–37)
MCV RBC AUTO: 83.8 FL — SIGNIFICANT CHANGE UP (ref 81–99)
MONOCYTES # BLD AUTO: 0.57 K/UL — SIGNIFICANT CHANGE UP (ref 0.1–0.6)
MONOCYTES NFR BLD AUTO: 7.3 % — SIGNIFICANT CHANGE UP (ref 1.7–9.3)
NEUTROPHILS # BLD AUTO: 6.62 K/UL — HIGH (ref 1.4–6.5)
NEUTROPHILS NFR BLD AUTO: 84.5 % — HIGH (ref 42.2–75.2)
NEUTS HYPERSEG # BLD: PRESENT — SIGNIFICANT CHANGE UP
PHOSPHATE SERPL-MCNC: 3.7 MG/DL — SIGNIFICANT CHANGE UP (ref 2.1–4.9)
PLAT MORPH BLD: NORMAL — SIGNIFICANT CHANGE UP
PLATELET # BLD AUTO: 268 K/UL — SIGNIFICANT CHANGE UP (ref 130–400)
POIKILOCYTOSIS BLD QL AUTO: SLIGHT — SIGNIFICANT CHANGE UP
POLYCHROMASIA BLD QL SMEAR: SLIGHT — SIGNIFICANT CHANGE UP
POTASSIUM SERPL-MCNC: 5 MMOL/L — SIGNIFICANT CHANGE UP (ref 3.5–5)
POTASSIUM SERPL-SCNC: 5 MMOL/L — SIGNIFICANT CHANGE UP (ref 3.5–5)
RBC # BLD: 3.51 M/UL — LOW (ref 4.2–5.4)
RBC # FLD: 17.4 % — HIGH (ref 11.5–14.5)
RBC BLD AUTO: ABNORMAL
SODIUM SERPL-SCNC: 141 MMOL/L — SIGNIFICANT CHANGE UP (ref 135–146)
TARGETS BLD QL SMEAR: SIGNIFICANT CHANGE UP
VARIANT LYMPHS # BLD: 0.9 % — SIGNIFICANT CHANGE UP (ref 0–5)
WBC # BLD: 7.84 K/UL — SIGNIFICANT CHANGE UP (ref 4.8–10.8)
WBC # FLD AUTO: 7.84 K/UL — SIGNIFICANT CHANGE UP (ref 4.8–10.8)

## 2019-12-15 PROCEDURE — 83020 HEMOGLOBIN ELECTROPHORESIS: CPT | Mod: 26

## 2019-12-15 RX ADMIN — Medication 25 MILLIGRAM(S): at 17:15

## 2019-12-15 RX ADMIN — INSULIN GLARGINE 10 UNIT(S): 100 INJECTION, SOLUTION SUBCUTANEOUS at 22:09

## 2019-12-15 RX ADMIN — POLYETHYLENE GLYCOL 3350 17 GRAM(S): 17 POWDER, FOR SOLUTION ORAL at 17:16

## 2019-12-15 RX ADMIN — RIVAROXABAN 20 MILLIGRAM(S): KIT at 17:15

## 2019-12-15 RX ADMIN — PANTOPRAZOLE SODIUM 40 MILLIGRAM(S): 20 TABLET, DELAYED RELEASE ORAL at 07:58

## 2019-12-15 RX ADMIN — Medication 4 UNIT(S): at 17:15

## 2019-12-15 RX ADMIN — AMLODIPINE BESYLATE 5 MILLIGRAM(S): 2.5 TABLET ORAL at 05:31

## 2019-12-15 RX ADMIN — CHLORHEXIDINE GLUCONATE 1 APPLICATION(S): 213 SOLUTION TOPICAL at 05:31

## 2019-12-15 RX ADMIN — ATORVASTATIN CALCIUM 20 MILLIGRAM(S): 80 TABLET, FILM COATED ORAL at 22:09

## 2019-12-15 RX ADMIN — Medication 100 MILLIGRAM(S): at 13:59

## 2019-12-15 RX ADMIN — Medication 4 UNIT(S): at 11:40

## 2019-12-15 RX ADMIN — Medication 4 UNIT(S): at 07:58

## 2019-12-15 RX ADMIN — Medication 100 MILLIGRAM(S): at 05:31

## 2019-12-15 RX ADMIN — Medication 100 MILLIGRAM(S): at 22:09

## 2019-12-15 RX ADMIN — Medication 3: at 11:41

## 2019-12-15 RX ADMIN — Medication 25 MILLIGRAM(S): at 05:31

## 2019-12-15 RX ADMIN — POLYETHYLENE GLYCOL 3350 17 GRAM(S): 17 POWDER, FOR SOLUTION ORAL at 05:32

## 2019-12-15 NOTE — PROGRESS NOTE ADULT - SUBJECTIVE AND OBJECTIVE BOX
brought to the ER after fall at home.  The pt was walking with walker and fell backward hitting back of head, helped up by son and brought to the ER as a trauma level.  The pt states she hit the back of her head but denied CP, palp, SOB or LOC.  Pt had 4 similar falls at home..  In the ER CT of head, C spine chest and abd and pelvis was negative for any acute pathology.  The pt is transferred to medicine for further evaluation and care.  The PMHX includes:  HTN, aSHD, , afib on Xarelto, DM II, DLD, OA, DDD, DJD, hip surgery, mobility dysfunction, + walker dependent.    INTERVAL HPI/OVERNIGHT EVENTS: pt on tele, rhythm being monitored, off CCB resumed low does BB, electrolytes nl, Mg corrected, overall feels well    MEDICATIONS  (STANDING):  dextrose 5%. 1000 milliLiter(s) (50 mL/Hr) IV Continuous <Continuous>  dextrose 50% Injectable 12.5 Gram(s) IV Push once  dextrose 50% Injectable 25 Gram(s) IV Push once  dextrose 50% Injectable 25 Gram(s) IV Push once  docusate sodium Oral Tab/Cap - Peds 100 milliGRAM(s) Oral three times a day  insulin glargine Injectable (LANTUS) 10 Unit(s) SubCutaneous at bedtime  insulin lispro (HumaLOG) corrective regimen sliding scale   SubCutaneous three times a day before meals  insulin lispro Injectable (HumaLOG) 4 Unit(s) SubCutaneous three times a day before meals  magnesium oxide 400 milliGRAM(s) Oral once  magnesium sulfate  IVPB 2 Gram(s) IV Intermittent once  pantoprazole    Tablet 40 milliGRAM(s) Oral before breakfast  simvastatin 40 milliGRAM(s) Oral at bedtime    MEDICATIONS  (PRN):  acetaminophen   Tablet .. 650 milliGRAM(s) Oral every 6 hours PRN Mild Pain (1 - 3)  dextrose 40% Gel 15 Gram(s) Oral once PRN Blood Glucose LESS THAN 70 milliGRAM(s)/deciliter  glucagon  Injectable 1 milliGRAM(s) IntraMuscular once PRN Glucose LESS THAN 70 milligrams/deciliter      Allergies    No Known Drug Allergies  Originally Entered as [RASH] reaction to [cashew nut] (Unknown)    Vital Signs Last 24 Hrs    T(F): 97.2  HR: 77  BP: 148/67    RR: 18  SpO2: 96%    PHYSICAL EXAM:      Constitutional: pt alert, oriented but at times forgetful NAD    Eyes:  nonicteric    ENMT: dry oral mucosa, dental defects    Neck:  supple, no JVD, no bruits    Back: TH kyphosis    Respiratory:  shallow respirations, scattered rhonchi, no wheezing, crackles or rales    Cardiovascular:  S1S2 + II/VI BRYAN    Gastrointestinal:  globose, soft and benign    Genitourinary:  no thomas    Extremities:+ arthritic changes, moves all ext    Vascular:  dec pedal pulses    Neurological:  nonfocal    Skin: posterior scalp hematoma    Lymph Nodes:  not enlarged    LABS:                        8.9    7.8  )-----------( 268             29    141 |  103  |  18  ----------------------------<  105  5.0   |  25  |  0.8  GFR 72, 65  Ca    8.3<L>        Mg     1.7, 2.3  troponin < 0.01  TPro  4.7<L>  /  Alb  3.0<L>  /  TBili  0.3  /  DBili  x   /  AST  12  /  ALT  10  /  AlkPhos  72  12-12    PT/INR - ( 12 Dec 2019 12:00 )   PT: 11.60 sec;   INR: 1.01 ratio         PTT - ( 11 Dec 2019 10:00 )  PTT:32.1 sec      RADIOLOGY & ADDITIONAL TESTS:  CTH:  atrophy, severe microvascular white matter changes, stable lacunar infarcts, L scalp hematoma, calvarium intact    CT of C spine:  multi level DDD, nofx or dislocation  CT of chest and abd:  in summary no acute pathology    EK/14 NSR 92/min, L axis, RBBB    ECHO:  EF  60-65%, mild LVH, mild inc in wall thickness, mild MR/MS, thickening of posterior leaflet. mild-mod TR, mild AI sclerotic aortic valve brought to the ER after fall at home.  The pt was walking with walker and fell backward hitting back of head, helped up by son and brought to the ER as a trauma level.  The pt states she hit the back of her head but denied CP, palp, SOB or LOC.  Pt had 4 similar falls at home..  In the ER CT of head, C spine chest and abd and pelvis was negative for any acute pathology.  The pt is transferred to medicine for further evaluation and care.  The PMHX includes:  HTN, aSHD, , afib on Xarelto, DM II, DLD, OA, DDD, DJD, hip surgery, mobility dysfunction, + walker dependent.    INTERVAL HPI/OVERNIGHT EVENTS: pt on tele, rhythm being monitored, off CCB resumed low does BB, electrolytes nl, Mg corrected, overall feels well    MEDICATIONS  (STANDING):  dextrose 5%. 1000 milliLiter(s) (50 mL/Hr) IV Continuous <Continuous>  dextrose 50% Injectable 12.5 Gram(s) IV Push once  dextrose 50% Injectable 25 Gram(s) IV Push once  dextrose 50% Injectable 25 Gram(s) IV Push once  docusate sodium Oral Tab/Cap - Peds 100 milliGRAM(s) Oral three times a day  insulin glargine Injectable (LANTUS) 10 Unit(s) SubCutaneous at bedtime  insulin lispro (HumaLOG) corrective regimen sliding scale   SubCutaneous three times a day before meals  insulin lispro Injectable (HumaLOG) 4 Unit(s) SubCutaneous three times a day before meals  magnesium oxide 400 milliGRAM(s) Oral once  magnesium sulfate  IVPB 2 Gram(s) IV Intermittent once  pantoprazole    Tablet 40 milliGRAM(s) Oral before breakfast  simvastatin 40 milliGRAM(s) Oral at bedtime  metoprolol tar 25mg po q12  MEDICATIONS  (PRN):  acetaminophen   Tablet .. 650 milliGRAM(s) Oral every 6 hours PRN Mild Pain (1 - 3)  dextrose 40% Gel 15 Gram(s) Oral once PRN Blood Glucose LESS THAN 70 milliGRAM(s)/deciliter  glucagon  Injectable 1 milliGRAM(s) IntraMuscular once PRN Glucose LESS THAN 70 milligrams/deciliter      Allergies    No Known Drug Allergies  Originally Entered as [RASH] reaction to [cashew nut] (Unknown)    Vital Signs Last 24 Hrs    T(F): 97.2  HR: 77  BP: 148/67    RR: 18  SpO2: 96%    PHYSICAL EXAM:      Constitutional: pt alert, oriented but at times forgetful NAD    Eyes:  nonicteric    ENMT: dry oral mucosa, dental defects    Neck:  supple, no JVD, no bruits    Back: TH kyphosis    Respiratory:  shallow respirations, scattered rhonchi, no wheezing, crackles or rales    Cardiovascular:  S1S2 + II/VI BRYAN    Gastrointestinal:  globose, soft and benign    Genitourinary:  no thomas    Extremities:+ arthritic changes, moves all ext    Vascular:  dec pedal pulses    Neurological:  nonfocal    Skin: posterior scalp hematoma    Lymph Nodes:  not enlarged    LABS:                        8.9    7.8  )-----------( 268             29    141 |  103  |  18  ----------------------------<  105  5.0   |  25  |  0.8  GFR 72, 65  Ca    8.3<L>        Mg     1.7, 2.3  troponin < 0.01  TPro  4.7<L>  /  Alb  3.0<L>  /  TBili  0.3  /  DBili  x   /  AST  12  /  ALT  10  /  AlkPhos  72  12-12    PT/INR - ( 12 Dec 2019 12:00 )   PT: 11.60 sec;   INR: 1.01 ratio         PTT - ( 11 Dec 2019 10:00 )  PTT:32.1 sec      RADIOLOGY & ADDITIONAL TESTS:  CTH:  atrophy, severe microvascular white matter changes, stable lacunar infarcts, L scalp hematoma, calvarium intact    CT of C spine:  multi level DDD, nofx or dislocation  CT of chest and abd:  in summary no acute pathology    EK/14 NSR 92/min, L axis, RBBB    ECHO:  EF  60-65%, mild LVH, mild inc in wall thickness, mild MR/MS, thickening of posterior leaflet. mild-mod TR, mild AI sclerotic aortic valve

## 2019-12-15 NOTE — PROGRESS NOTE ADULT - ASSESSMENT
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

## 2019-12-15 NOTE — PROGRESS NOTE ADULT - SUBJECTIVE AND OBJECTIVE BOX
SUBJ:No chest pain or shortness of breath      MEDICATIONS  (STANDING):  amLODIPine   Tablet 5 milliGRAM(s) Oral daily  atorvastatin 20 milliGRAM(s) Oral at bedtime  chlorhexidine 4% Liquid 1 Application(s) Topical <User Schedule>  dextrose 5%. 1000 milliLiter(s) (50 mL/Hr) IV Continuous <Continuous>  dextrose 50% Injectable 12.5 Gram(s) IV Push once  dextrose 50% Injectable 25 Gram(s) IV Push once  dextrose 50% Injectable 25 Gram(s) IV Push once  docusate sodium Oral Tab/Cap - Peds 100 milliGRAM(s) Oral three times a day  insulin glargine Injectable (LANTUS) 10 Unit(s) SubCutaneous at bedtime  insulin lispro (HumaLOG) corrective regimen sliding scale   SubCutaneous three times a day before meals  insulin lispro Injectable (HumaLOG) 4 Unit(s) SubCutaneous three times a day before meals  metoprolol tartrate 25 milliGRAM(s) Oral two times a day  pantoprazole    Tablet 40 milliGRAM(s) Oral before breakfast  polyethylene glycol 3350 17 Gram(s) Oral two times a day  rivaroxaban 20 milliGRAM(s) Oral with dinner    MEDICATIONS  (PRN):  acetaminophen   Tablet .. 650 milliGRAM(s) Oral every 6 hours PRN Mild Pain (1 - 3)  dextrose 40% Gel 15 Gram(s) Oral once PRN Blood Glucose LESS THAN 70 milliGRAM(s)/deciliter  glucagon  Injectable 1 milliGRAM(s) IntraMuscular once PRN Glucose LESS THAN 70 milligrams/deciliter            Vital Signs Last 24 Hrs  T(C): 36.3 (15 Dec 2019 20:29), Max: 36.3 (15 Dec 2019 20:29)  T(F): 97.3 (15 Dec 2019 20:29), Max: 97.3 (15 Dec 2019 20:29)  HR: 74 (15 Dec 2019 20:29) (74 - 98)  BP: 117/58 (15 Dec 2019 20:29) (108/55 - 148/67)  BP(mean): --  RR: 18 (15 Dec 2019 20:29) (18 - 18)  SpO2: 96% (15 Dec 2019 07:22) (96% - 96%)      ECG:NML    TTE:    LABS:                        8.9    7.84  )-----------( 268      ( 15 Dec 2019 06:18 )             29.4     12-15    141  |  103  |  18  ----------------------------<  105<H>  5.0   |  25  |  0.8    Ca    8.8      15 Dec 2019 06:18  Phos  3.7     12-15  Mg     2.3     12-15    TPro  5.2<L>  /  Alb  3.1<L>  /  TBili  0.5  /  DBili  x   /  AST  15  /  ALT  12  /  AlkPhos  85  12-14    CARDIAC MARKERS ( 14 Dec 2019 06:04 )  x     / <0.01 ng/mL / x     / x     / x              I&O's Summary    14 Dec 2019 07:01  -  15 Dec 2019 07:00  --------------------------------------------------------  IN: 845 mL / OUT: 750 mL / NET: 95 mL    15 Dec 2019 07:01  -  15 Dec 2019 22:04  --------------------------------------------------------  IN: 740 mL / OUT: 300 mL / NET: 440 mL      BNP

## 2019-12-16 LAB
B BURGDOR C6 AB SER-ACNC: NEGATIVE — SIGNIFICANT CHANGE UP
B BURGDOR IGG+IGM SER-ACNC: <0.01 INDEX — SIGNIFICANT CHANGE UP (ref 0.01–0.89)
CORTIS AM PEAK SERPL-MCNC: 8.8 UG/DL — SIGNIFICANT CHANGE UP (ref 6–18.4)
GLUCOSE BLDC GLUCOMTR-MCNC: 109 MG/DL — HIGH (ref 70–99)
GLUCOSE BLDC GLUCOMTR-MCNC: 270 MG/DL — HIGH (ref 70–99)
GLUCOSE BLDC GLUCOMTR-MCNC: 96 MG/DL — SIGNIFICANT CHANGE UP (ref 70–99)
GLUCOSE BLDC GLUCOMTR-MCNC: 99 MG/DL — SIGNIFICANT CHANGE UP (ref 70–99)

## 2019-12-16 PROCEDURE — 99232 SBSQ HOSP IP/OBS MODERATE 35: CPT

## 2019-12-16 RX ORDER — AMIODARONE HYDROCHLORIDE 400 MG/1
TABLET ORAL
Refills: 0 | Status: DISCONTINUED | OUTPATIENT
Start: 2019-12-16 | End: 2019-12-18

## 2019-12-16 RX ORDER — AMIODARONE HYDROCHLORIDE 400 MG/1
200 TABLET ORAL DAILY
Refills: 0 | Status: CANCELLED | OUTPATIENT
Start: 2019-12-30 | End: 2019-12-18

## 2019-12-16 RX ORDER — AMIODARONE HYDROCHLORIDE 400 MG/1
TABLET ORAL
Refills: 0 | Status: DISCONTINUED | OUTPATIENT
Start: 2019-12-16 | End: 2019-12-16

## 2019-12-16 RX ORDER — AMIODARONE HYDROCHLORIDE 400 MG/1
200 TABLET ORAL
Refills: 0 | Status: CANCELLED | OUTPATIENT
Start: 2019-12-23 | End: 2019-12-18

## 2019-12-16 RX ORDER — AMIODARONE HYDROCHLORIDE 400 MG/1
400 TABLET ORAL
Refills: 0 | Status: DISCONTINUED | OUTPATIENT
Start: 2019-12-16 | End: 2019-12-18

## 2019-12-16 RX ORDER — CEFTRIAXONE 500 MG/1
1000 INJECTION, POWDER, FOR SOLUTION INTRAMUSCULAR; INTRAVENOUS EVERY 24 HOURS
Refills: 0 | Status: DISCONTINUED | OUTPATIENT
Start: 2019-12-16 | End: 2019-12-17

## 2019-12-16 RX ADMIN — Medication 4 UNIT(S): at 17:02

## 2019-12-16 RX ADMIN — RIVAROXABAN 20 MILLIGRAM(S): KIT at 17:03

## 2019-12-16 RX ADMIN — Medication 3: at 12:08

## 2019-12-16 RX ADMIN — Medication 100 MILLIGRAM(S): at 05:36

## 2019-12-16 RX ADMIN — INSULIN GLARGINE 10 UNIT(S): 100 INJECTION, SOLUTION SUBCUTANEOUS at 21:47

## 2019-12-16 RX ADMIN — AMIODARONE HYDROCHLORIDE 400 MILLIGRAM(S): 400 TABLET ORAL at 17:04

## 2019-12-16 RX ADMIN — Medication 100 MILLIGRAM(S): at 21:47

## 2019-12-16 RX ADMIN — PANTOPRAZOLE SODIUM 40 MILLIGRAM(S): 20 TABLET, DELAYED RELEASE ORAL at 05:36

## 2019-12-16 RX ADMIN — CEFTRIAXONE 100 MILLIGRAM(S): 500 INJECTION, POWDER, FOR SOLUTION INTRAMUSCULAR; INTRAVENOUS at 18:48

## 2019-12-16 RX ADMIN — Medication 25 MILLIGRAM(S): at 05:36

## 2019-12-16 RX ADMIN — Medication 100 MILLIGRAM(S): at 13:04

## 2019-12-16 RX ADMIN — AMLODIPINE BESYLATE 5 MILLIGRAM(S): 2.5 TABLET ORAL at 05:36

## 2019-12-16 RX ADMIN — ATORVASTATIN CALCIUM 20 MILLIGRAM(S): 80 TABLET, FILM COATED ORAL at 21:47

## 2019-12-16 RX ADMIN — Medication 4 UNIT(S): at 07:53

## 2019-12-16 RX ADMIN — POLYETHYLENE GLYCOL 3350 17 GRAM(S): 17 POWDER, FOR SOLUTION ORAL at 05:35

## 2019-12-16 RX ADMIN — Medication 4 UNIT(S): at 12:08

## 2019-12-16 RX ADMIN — CHLORHEXIDINE GLUCONATE 1 APPLICATION(S): 213 SOLUTION TOPICAL at 05:35

## 2019-12-16 RX ADMIN — POLYETHYLENE GLYCOL 3350 17 GRAM(S): 17 POWDER, FOR SOLUTION ORAL at 17:04

## 2019-12-16 NOTE — PROGRESS NOTE ADULT - ASSESSMENT
Patient is a 89y old  Female who presents with a chief complaint of Fall (15 Dec 2019 22:04)    Hypertension  Diabetes mellitus, type 2  Post fall/bradycardia: AV block most likely caused by large doses of beta and calcium  blockers  Paroxysmal atrial fibrillation    Recommendations:   Start amiodarone 400 mg q12h for 1 week, then 200 mg q12h for 1 week, then 200 mg once daily.   Monitor TFTs, PFTs, and LFTs  No CCB or Beta-blockers  Can d/c to SNF/home from EP stand-point Patient is a 89y old  Female who presents with a chief complaint of Fall (15 Dec 2019 22:04)    Hypertension SEVERE DEMENTIA  post fall bradycardia due to excess B ,CA blockers responded to  ca iv and glucagon  currently in S.R at 78/min with  normal [place]  Paroxysmal atrial fibrillation  Recommendations:    Start amiodarone 400 mg q12h for 1 week, then 200 mg q12h for 1 week, then 200 mg once daily.   Monitor TFTs, PFTs, and LFTs  No CCB or Beta-blockers  Can d/c to SNF/home from EP stand-point

## 2019-12-16 NOTE — PROGRESS NOTE ADULT - ASSESSMENT
Assessment and Plan:     89 y/o F w/ a pmh of HTN, afib on xerelto, DMT2, DLD, who presents to the ED with a complaint of a fall in which she hit the back of her head.    #Symptomatic Bradycardia  - EP recs appreciated. Likely 2/2 to BBlocker or CCB. Will give pt amiodarone 400 BID for 1 week, 200 BID for 1 week, then 200 qd   - s/p rapid response for symptomatic bradycardia (lethargy), no further events in the unit   - avoid bblocker & CCB  - No bradycardic events on tele on tele  - replete electrolytes as needed    #Incidental pneumobilia on CT A/P  - currently afebrile & no RUQ tenderness on examination this AM  - KUB negative   - c/w bowel regimen     #HO Afib    - resumed Xarelto     #Head Trauma secondary to mechanical fall  - CT Head - Left parietal extracalvarial scalp soft tissue swelling/hematoma. No evidence of underlying fracture  - trauma w/u: no fractures noted   - likely secondary to bradycardia   - Pt/ rehab: SNF vs in patient rehab  - fall precautions   - consider repeating CTH if Mental status worsens     #Mild delirium  - per family, patient has never been this confused  - UA from 12/13 positive, will send another sample today for culture (pt denied urinary symptoms this AM)  - previous   - spoke with family about visiting more frequently    #HO DM controlled   insulin regimen     #HO HTN  - amlodipine     #HO DLD  - statin     DVT ppx: xarelto  GI ppx: protonix   Code Status: full code  DISPO: from home, pending SNF vs in patient rehab

## 2019-12-16 NOTE — PROGRESS NOTE ADULT - ASSESSMENT
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    pt was cleared by trauma SVC  Ct of head and C spine and abd and pelvis show no acute pathology, pt has L posterior scalp hematoma and stable lacunar infarcts and white matter changes    sp RR on  12/12 for bradyarrhythmia, sp short stay in CCU, now cont monitoring on tele  today some confusion and agitation, check urine again, pt gets upset with too many ques, freq re-orientation and positive reinforcements will be helpful to keep pt calm, inc family visits   EPS:  cardiac meds being adjusted, cont to hold  CCB, to start amiodarone 400mg q12 x 1 week then 200mg q12 x 1 week, then 200mg daily    Cardio ff up and care appreciated  monitor and correct any electrolyte abnormality  Rehab consult  social svc consult:  since no PPM needed, cardiac meds adjusted, may start proceedings for Rehab in pt vs SNF

## 2019-12-16 NOTE — PROGRESS NOTE ADULT - SUBJECTIVE AND OBJECTIVE BOX
SUBJ:No chest pain or shortness of breath      MEDICATIONS  (STANDING):  aMIOdarone    Tablet   Oral   aMIOdarone    Tablet 400 milliGRAM(s) Oral two times a day  amLODIPine   Tablet 5 milliGRAM(s) Oral daily  atorvastatin 20 milliGRAM(s) Oral at bedtime  chlorhexidine 4% Liquid 1 Application(s) Topical <User Schedule>  dextrose 5%. 1000 milliLiter(s) (50 mL/Hr) IV Continuous <Continuous>  dextrose 50% Injectable 12.5 Gram(s) IV Push once  dextrose 50% Injectable 25 Gram(s) IV Push once  dextrose 50% Injectable 25 Gram(s) IV Push once  docusate sodium Oral Tab/Cap - Peds 100 milliGRAM(s) Oral three times a day  insulin glargine Injectable (LANTUS) 10 Unit(s) SubCutaneous at bedtime  insulin lispro (HumaLOG) corrective regimen sliding scale   SubCutaneous three times a day before meals  insulin lispro Injectable (HumaLOG) 4 Unit(s) SubCutaneous three times a day before meals  pantoprazole    Tablet 40 milliGRAM(s) Oral before breakfast  polyethylene glycol 3350 17 Gram(s) Oral two times a day  rivaroxaban 20 milliGRAM(s) Oral with dinner    MEDICATIONS  (PRN):  acetaminophen   Tablet .. 650 milliGRAM(s) Oral every 6 hours PRN Mild Pain (1 - 3)  dextrose 40% Gel 15 Gram(s) Oral once PRN Blood Glucose LESS THAN 70 milliGRAM(s)/deciliter  glucagon  Injectable 1 milliGRAM(s) IntraMuscular once PRN Glucose LESS THAN 70 milligrams/deciliter            Vital Signs Last 24 Hrs  T(C): 36.2 (16 Dec 2019 13:53), Max: 36.3 (15 Dec 2019 20:29)  T(F): 97.2 (16 Dec 2019 13:53), Max: 97.3 (15 Dec 2019 20:29)  HR: 65 (16 Dec 2019 13:53) (65 - 77)  BP: 111/56 (16 Dec 2019 13:53) (111/56 - 148/67)  BP(mean): --  RR: 18 (16 Dec 2019 13:53) (18 - 18)  SpO2: --      ECG:NML    TTE:    LABS:                        8.9    7.84  )-----------( 268      ( 15 Dec 2019 06:18 )             29.4     12-15    141  |  103  |  18  ----------------------------<  105<H>  5.0   |  25  |  0.8    Ca    8.8      15 Dec 2019 06:18  Phos  3.7     12-15  Mg     2.3     12-15              I&O's Summary    15 Dec 2019 07:01  -  16 Dec 2019 07:00  --------------------------------------------------------  IN: 740 mL / OUT: 300 mL / NET: 440 mL      BNP

## 2019-12-16 NOTE — PROGRESS NOTE ADULT - SUBJECTIVE AND OBJECTIVE BOX
Patient is a 89y old  Female who presents with a chief complaint of Fall (15 Dec 2019 22:04)      Subjective:  Patient seen and examined at bedside.        Review Of Systems: No chest pain, shortness of breath, or palpitations .  Patient is confused.           Medications:  acetaminophen   Tablet .. 650 milliGRAM(s) Oral every 6 hours PRN  amLODIPine   Tablet 5 milliGRAM(s) Oral daily  atorvastatin 20 milliGRAM(s) Oral at bedtime  chlorhexidine 4% Liquid 1 Application(s) Topical <User Schedule>  dextrose 40% Gel 15 Gram(s) Oral once PRN  dextrose 5%. 1000 milliLiter(s) IV Continuous <Continuous>  dextrose 50% Injectable 12.5 Gram(s) IV Push once  dextrose 50% Injectable 25 Gram(s) IV Push once  dextrose 50% Injectable 25 Gram(s) IV Push once  docusate sodium Oral Tab/Cap - Peds 100 milliGRAM(s) Oral three times a day  glucagon  Injectable 1 milliGRAM(s) IntraMuscular once PRN  insulin glargine Injectable (LANTUS) 10 Unit(s) SubCutaneous at bedtime  insulin lispro (HumaLOG) corrective regimen sliding scale   SubCutaneous three times a day before meals  insulin lispro Injectable (HumaLOG) 4 Unit(s) SubCutaneous three times a day before meals  metoprolol tartrate 25 milliGRAM(s) Oral two times a day  pantoprazole    Tablet 40 milliGRAM(s) Oral before breakfast  polyethylene glycol 3350 17 Gram(s) Oral two times a day  rivaroxaban 20 milliGRAM(s) Oral with dinner      PAST MEDICAL & SURGICAL HISTORY:  Atrial fibrillation  High blood cholesterol  Hypertension  Diabetes mellitus, type 2  History of hip surgery      Objective:  Vitals:  T(F): 97.3 (12-16), Max: 97.3 (12-15)  HR: 76 (12-16) (74 - 77)  BP: 146/67 (12-16) (108/55 - 148/67)  RR: 18 (12-16)  SpO2: --  I&O's Summary    15 Dec 2019 07:01  -  16 Dec 2019 07:00  --------------------------------------------------------  IN: 740 mL / OUT: 300 mL / NET: 440 mL        Physical Exam:  Appearance: No acute distress; well appearing  Eyes: PERRL, EOMI, pink conjunctiva  HENT: Normal oral muscosa  Cardiovascular: RRR, S1, S2, no murmurs, rubs, or gallops; no edema; no JVD  Respiratory: Clear to auscultation bilaterally  Gastrointestinal: soft, non-tender, non-distended with normal bowel sounds  Musculoskeletal: No clubbing; no joint deformity   Neurologic: Non-focal  Lymphatic: No lymphadenopathy  Psychiatry: AAOx3, mood & affect appropriate  Skin: No rashes, ecchymoses, or cyanosis                          8.9    7.84  )-----------( 268      ( 15 Dec 2019 06:18 )             29.4     12-15    141  |  103  |  18  ----------------------------<  105<H>  5.0   |  25  |  0.8    Ca    8.8      15 Dec 2019 06:18  Phos  3.7     12-15  Mg     2.3     12-15                    New ECG(s): Personally reviewed    Echo:  < from: Transthoracic Echocardiogram (12.13.19 @ 08:35) >  Summary:   1. Left ventricular ejection fraction, by visual estimation, is 60 to   65%.   2. Normal global left ventricular systolic function.   3. LV Ejection Fraction by Mistry's Method with a biplane EF of 58 %.   4. Mild left ventricular hypertrophy.   5. Mildly increased LV wall thickness.   6. Normal left ventricular internal cavity size.   7. Degenerative mitral valve.   8. Mild mitral valve regurgitation.   9. Mild to moderate mitral annular calcification.  10. Moderate thickening and calcification of the anterior and posterior   mitral valve leaflets.  11. Peak transmitral mean gradient equals 2.3 mmHg, calculated mitral   valve area by pressure half time equals 1.73 cm² consistent with mild   mitral stenosis.  12. Mild-moderate tricuspid regurgitation.  13. Mild aortic regurgitation.  14. Sclerotic aortic valve with normal opening.  15. Estimated pulmonary artery systolic pressure is 47.1 mmHg assuming a   right atrial pressure of 5 mmHg, which is consistent with mild pulmonary   hypertension.  16. LA volume Index is 31.4 ml/m² ml/m2.  17. Mitral valve mean gradient is 2.3 mmHg consistent with mild mitral   stenosis.    < end of copied text >      Stress Testing:     Cath:    Imaging:    Interpretation of Telemetry: No bradycardia, no AV block, Sinus rhythm

## 2019-12-16 NOTE — PROGRESS NOTE ADULT - SUBJECTIVE AND OBJECTIVE BOX
brought to the ER after fall at home.  The pt was walking with walker and fell backward hitting back of head, helped up by son and brought to the ER as a trauma level.  The pt states she hit the back of her head but denied CP, palp, SOB or LOC.  Pt had 4 similar falls at home..  In the ER CT of head, C spine chest and abd and pelvis was negative for any acute pathology.  The pt is transferred to medicine for further evaluation and care.  The PMHX includes:  HTN, aSHD, , afib on Xarelto, DM II, DLD, OA, DDD, DJD, hip surgery, mobility dysfunction, + walker dependent.    INTERVAL HPI/OVERNIGHT EVENTS: pt on tele, cardio and EPS ff, as per EPS to start amiodarone, electrolytes WNL, some confusion noted, resend urine for C&S, freq reorientation, pt noted to get flustered and confused when asked too many ques    MEDICATIONS  (STANDING):  dextrose 5%. 1000 milliLiter(s) (50 mL/Hr) IV Continuous <Continuous>  dextrose 50% Injectable 12.5 Gram(s) IV Push once  dextrose 50% Injectable 25 Gram(s) IV Push once  dextrose 50% Injectable 25 Gram(s) IV Push once  docusate sodium Oral Tab/Cap - Peds 100 milliGRAM(s) Oral three times a day  insulin glargine Injectable (LANTUS) 10 Unit(s) SubCutaneous at bedtime  insulin lispro (HumaLOG) corrective regimen sliding scale   SubCutaneous three times a day before meals  insulin lispro Injectable (HumaLOG) 4 Unit(s) SubCutaneous three times a day before meals  magnesium oxide 400 milliGRAM(s) Oral once  magnesium sulfate  IVPB 2 Gram(s) IV Intermittent once  pantoprazole    Tablet 40 milliGRAM(s) Oral before breakfast  simvastatin 40 milliGRAM(s) Oral at bedtime  metoprolol tar 25mg po q12  MEDICATIONS  (PRN):  acetaminophen   Tablet .. 650 milliGRAM(s) Oral every 6 hours PRN Mild Pain (1 - 3)  dextrose 40% Gel 15 Gram(s) Oral once PRN Blood Glucose LESS THAN 70 milliGRAM(s)/deciliter  glucagon  Injectable 1 milliGRAM(s) IntraMuscular once PRN Glucose LESS THAN 70 milligrams/deciliter  Amiodarone 400mg  q12x 1 week    Allergies    No Known Drug Allergies  Originally Entered as [RASH] reaction to [cashew nut] (Unknown)    Vital Signs Last 24 Hrs    T(F): 97.2  HR: 77  BP: 147/76    RR: 18  SpO2: 96%    PHYSICAL EXAM:      Constitutional: pt alert, oriented but at times forgetful and confused but  NAD    Eyes:  nonicteric    ENMT: dry oral mucosa, dental defects    Neck:  supple, no JVD, no bruits    Back: TH kyphosis    Respiratory:  shallow respirations, scattered rhonchi, no wheezing, crackles or rales    Cardiovascular:  S1S2 + II/VI BRYAN    Gastrointestinal:  globose, soft and benign    Genitourinary:  no thomas    Extremities:+ arthritic changes, moves all ext    Vascular:  dec pedal pulses    Neurological:  nonfocal    Skin: posterior scalp hematoma    Lymph Nodes:  not enlarged    LABS:                        8.9    7.8  )-----------( 268             29    141 |  103  |  18  ----------------------------<  105  5.0   |  25  |  0.8  GFR 72, 65  Ca    8.3<L>        Mg     1.7, 2.3  troponin < 0.01  TPro  4.7<L>  /  Alb  3.0<L>  /  TBili  0.3  /  DBili  x   /  AST  12  /  ALT  10  /  AlkPhos  72  12-12    PT/INR - ( 12 Dec 2019 12:00 )   PT: 11.60 sec;   INR: 1.01 ratio         PTT - ( 11 Dec 2019 10:00 )  PTT:32.1 sec      RADIOLOGY & ADDITIONAL TESTS:  CTH:  atrophy, severe microvascular white matter changes, stable lacunar infarcts, L scalp hematoma, calvarium intact    CT of C spine:  multi level DDD, nofx or dislocation  CT of chest and abd:  in summary no acute pathology    EK/14 NSR 92/min, L axis, RBBB    ECHO:  EF  60-65%, mild LVH, mild inc in wall thickness, mild MR/MS, thickening of posterior leaflet. mild-mod TR, mild AI sclerotic aortic valve

## 2019-12-17 LAB
GLUCOSE BLDC GLUCOMTR-MCNC: 110 MG/DL — HIGH (ref 70–99)
GLUCOSE BLDC GLUCOMTR-MCNC: 142 MG/DL — HIGH (ref 70–99)
GLUCOSE BLDC GLUCOMTR-MCNC: 193 MG/DL — HIGH (ref 70–99)
GLUCOSE BLDC GLUCOMTR-MCNC: 233 MG/DL — HIGH (ref 70–99)

## 2019-12-17 PROCEDURE — 99232 SBSQ HOSP IP/OBS MODERATE 35: CPT

## 2019-12-17 RX ORDER — HALOPERIDOL DECANOATE 100 MG/ML
1 INJECTION INTRAMUSCULAR ONCE
Refills: 0 | Status: COMPLETED | OUTPATIENT
Start: 2019-12-17 | End: 2019-12-17

## 2019-12-17 RX ORDER — CEFPODOXIME PROXETIL 100 MG
100 TABLET ORAL EVERY 12 HOURS
Refills: 0 | Status: DISCONTINUED | OUTPATIENT
Start: 2019-12-17 | End: 2019-12-18

## 2019-12-17 RX ADMIN — AMIODARONE HYDROCHLORIDE 400 MILLIGRAM(S): 400 TABLET ORAL at 17:03

## 2019-12-17 RX ADMIN — AMIODARONE HYDROCHLORIDE 400 MILLIGRAM(S): 400 TABLET ORAL at 05:49

## 2019-12-17 RX ADMIN — PANTOPRAZOLE SODIUM 40 MILLIGRAM(S): 20 TABLET, DELAYED RELEASE ORAL at 06:02

## 2019-12-17 RX ADMIN — Medication 100 MILLIGRAM(S): at 05:49

## 2019-12-17 RX ADMIN — POLYETHYLENE GLYCOL 3350 17 GRAM(S): 17 POWDER, FOR SOLUTION ORAL at 17:01

## 2019-12-17 RX ADMIN — ATORVASTATIN CALCIUM 20 MILLIGRAM(S): 80 TABLET, FILM COATED ORAL at 21:57

## 2019-12-17 RX ADMIN — Medication 4 UNIT(S): at 07:46

## 2019-12-17 RX ADMIN — Medication 100 MILLIGRAM(S): at 17:04

## 2019-12-17 RX ADMIN — HALOPERIDOL DECANOATE 1 MILLIGRAM(S): 100 INJECTION INTRAMUSCULAR at 06:01

## 2019-12-17 RX ADMIN — RIVAROXABAN 20 MILLIGRAM(S): KIT at 17:03

## 2019-12-17 RX ADMIN — AMLODIPINE BESYLATE 5 MILLIGRAM(S): 2.5 TABLET ORAL at 05:49

## 2019-12-17 RX ADMIN — INSULIN GLARGINE 10 UNIT(S): 100 INJECTION, SOLUTION SUBCUTANEOUS at 22:01

## 2019-12-17 RX ADMIN — Medication 4 UNIT(S): at 17:01

## 2019-12-17 RX ADMIN — POLYETHYLENE GLYCOL 3350 17 GRAM(S): 17 POWDER, FOR SOLUTION ORAL at 05:49

## 2019-12-17 RX ADMIN — Medication 100 MILLIGRAM(S): at 21:57

## 2019-12-17 RX ADMIN — Medication 0.5 MILLIGRAM(S): at 00:54

## 2019-12-17 NOTE — PROGRESS NOTE ADULT - ASSESSMENT
Assessment and Plan:    88 y/o F w/ a pmh of HTN, afib on xerelto, DMT2, DLD, who presents to the ED with a complaint of a fall in which she hit the back of her head.    #Symptomatic Bradycardia  - EP recs appreciated. Likely 2/2 to BBlocker or CCB. Will give pt amiodarone 400 BID for 1 week, 200 BID for 1 week, then 200 qd   - s/p rapid response for symptomatic bradycardia (lethargy), no further events in the unit   - avoid bblocker & CCB  - No bradycardic events on tele  - replete electrolytes as needed    #Incidental pneumobilia on CT A/P  - currently afebrile & no RUQ tenderness on examination this AM  - KUB negative   - c/w bowel regimen     #HO Afib    - resumed Xarelto     #Head Trauma secondary to mechanical fall  - CT Head - Left parietal extracalvarial scalp soft tissue swelling/hematoma. No evidence of underlying fracture  - trauma w/u: no fractures noted   - likely secondary to bradycardia   - Pt/ rehab: SNF vs in patient rehab  - fall precautions   - consider repeating CTH if Mental status worsens     #Mild delirium  - per family, patient has never been this confused  - UA from 12/13 positive, pending urine culture. Will treat with PO vantin 100 q12   - spoke with family about visiting more frequently    #HO DM controlled   insulin regimen     #HO HTN  - amlodipine     #HO DLD  - statin     DVT ppx: xarelto  GI ppx: protonix   Code Status: full code  DISPO: from home, pending SNF vs in patient rehab

## 2019-12-17 NOTE — PROGRESS NOTE ADULT - SUBJECTIVE AND OBJECTIVE BOX
Hospital Day:  6d    Subjective:    Patient is a 89y old  Female who presents with a chief complaint of sp fall at home, laura arrhythmia. Overnight patient was agitated and delirius. Had no events on tele. This morning resting comfortably in chair. ROS negative for acute symptoms.       Past Medical Hx:   Atrial fibrillation  High blood cholesterol  Hypertension  Diabetes mellitus, type 2    Past Sx:  History of hip surgery    Allergies:  No Known Drug Allergies  Originally Entered as [RASH] reaction to [cashew nut] (Unknown)    Current Meds:   Stand Meds:  aMIOdarone    Tablet   Oral   aMIOdarone    Tablet 400 milliGRAM(s) Oral two times a day  amLODIPine   Tablet 5 milliGRAM(s) Oral daily  atorvastatin 20 milliGRAM(s) Oral at bedtime  cefpodoxime 100 milliGRAM(s) Oral every 12 hours  chlorhexidine 4% Liquid 1 Application(s) Topical <User Schedule>  dextrose 5%. 1000 milliLiter(s) (50 mL/Hr) IV Continuous <Continuous>  dextrose 50% Injectable 12.5 Gram(s) IV Push once  dextrose 50% Injectable 25 Gram(s) IV Push once  dextrose 50% Injectable 25 Gram(s) IV Push once  docusate sodium Oral Tab/Cap - Peds 100 milliGRAM(s) Oral three times a day  insulin glargine Injectable (LANTUS) 10 Unit(s) SubCutaneous at bedtime  insulin lispro (HumaLOG) corrective regimen sliding scale   SubCutaneous three times a day before meals  insulin lispro Injectable (HumaLOG) 4 Unit(s) SubCutaneous three times a day before meals  pantoprazole    Tablet 40 milliGRAM(s) Oral before breakfast  polyethylene glycol 3350 17 Gram(s) Oral two times a day  rivaroxaban 20 milliGRAM(s) Oral with dinner    PRN Meds:  acetaminophen   Tablet .. 650 milliGRAM(s) Oral every 6 hours PRN Mild Pain (1 - 3)  dextrose 40% Gel 15 Gram(s) Oral once PRN Blood Glucose LESS THAN 70 milliGRAM(s)/deciliter  glucagon  Injectable 1 milliGRAM(s) IntraMuscular once PRN Glucose LESS THAN 70 milligrams/deciliter  LORazepam   Injectable 0.5 milliGRAM(s) IV Push two times a day PRN Agitation    HOME MEDICATIONS:  dilTIAZem 300 mg/24 hours oral capsule, extended release: 1 cap(s) orally once a day  docusate sodium 100 mg oral capsule: 1 cap(s) orally 3 times a day  glimepiride 2 mg oral tablet: 1 tab(s) orally once a day  metFORMIN 500 mg oral tablet: 1 tab(s) orally once a day   Metoprolol Tartrate 50 mg oral tablet: 1 tab(s) orally 2 times a day  Protonix 40 mg oral delayed release tablet: 1 tab(s) orally once a day  rivaroxaban 20 mg oral tablet: 1 tab(s) orally every 24 hours  simvastatin 40 mg oral tablet: 1 tab(s) orally once a day (at bedtime)      Vital Signs:   T(F): 97.8 (12-17-19 @ 14:15), Max: 97.8 (12-17-19 @ 14:15)  HR: 81 (12-17-19 @ 14:15) (77 - 99)  BP: 163/72 (12-17-19 @ 14:15) (142/77 - 163/72)  RR: 18 (12-17-19 @ 14:15) (18 - 18)  SpO2: 98% (12-17-19 @ 07:43) (98% - 98%)      12-16-19 @ 07:01  -  12-17-19 @ 07:00  --------------------------------------------------------  IN: 390 mL / OUT: 150 mL / NET: 240 mL    12-17-19 @ 07:01  -  12-17-19 @ 14:37  --------------------------------------------------------  IN: 240 mL / OUT: 0 mL / NET: 240 mL        Physical Exam:   GENERAL: NAD  HEENT: NCAT  CHEST/LUNG: CTA b/l  HEART: Regular rate and rhythm; s1 s2 appreciated  ABDOMEN: Soft, Nontender, Nondistended; Bowel sounds present  EXTREMITIES: No LE edema b/l  NERVOUS SYSTEM:  Alert & Oriented X2 (person, place)        Labs:    Hemoglobin A1C, Whole Blood: 6.0 % (12-13-19 @ 05:37)      Troponin <0.01, CKMB --, CK -- 12-14-19 @ 06:04    Radiology:

## 2019-12-17 NOTE — PROGRESS NOTE ADULT - ASSESSMENT
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    pt was cleared by trauma SVC  Ct of head and C spine and abd and pelvis show no acute pathology, pt has L posterior scalp hematoma and stable lacunar infarcts and white matter changes    sp RR on  12/12 for bradyarrhythmia, sp short stay in CCU, now cont monitored  on tele, medically stable    EPS:  cardiac meds being adjusted, cont to hold  CCB, to start amiodarone 400mg q12 x 1 week then 200mg q12 x 1 week, then 200mg daily    Cardio ff up and care appreciated  monitor and correct any electrolyte abnormality  Rehab consult  social svc consult:  since no PPM needed, cardiac meds adjusted, medically stable  for Rehab 4A vs SNF

## 2019-12-17 NOTE — PROGRESS NOTE ADULT - SUBJECTIVE AND OBJECTIVE BOX
brought to the ER after fall at home.  The pt was walking with walker and fell backward hitting back of head, helped up by son and brought to the ER as a trauma level.  The pt states she hit the back of her head but denied CP, palp, SOB or LOC.  Pt had 4 similar falls at home..  In the ER CT of head, C spine chest and abd and pelvis was negative for any acute pathology.  The pt is transferred to medicine for further evaluation and care.  The PMHX includes:  HTN, aSHD, , afib on Xarelto, DM II, DLD, OA, DDD, DJD, hip surgery, mobility dysfunction, + walker dependent.    INTERVAL HPI/OVERNIGHT EVENTS: pt is medically stable, awaiting rehab i.e. 4A vs SNF    MEDICATIONS  (STANDING):  dextrose 5%. 1000 milliLiter(s) (50 mL/Hr) IV Continuous <Continuous>  dextrose 50% Injectable 12.5 Gram(s) IV Push once  dextrose 50% Injectable 25 Gram(s) IV Push once  dextrose 50% Injectable 25 Gram(s) IV Push once  docusate sodium Oral Tab/Cap - Peds 100 milliGRAM(s) Oral three times a day  insulin glargine Injectable (LANTUS) 10 Unit(s) SubCutaneous at bedtime  insulin lispro (HumaLOG) corrective regimen sliding scale   SubCutaneous three times a day before meals  insulin lispro Injectable (HumaLOG) 4 Unit(s) SubCutaneous three times a day before meals  magnesium oxide 400 milliGRAM(s) Oral once  magnesium sulfate  IVPB 2 Gram(s) IV Intermittent once  pantoprazole    Tablet 40 milliGRAM(s) Oral before breakfast  simvastatin 40 milliGRAM(s) Oral at bedtime  metoprolol tar 25mg po q12  MEDICATIONS  (PRN):  acetaminophen   Tablet .. 650 milliGRAM(s) Oral every 6 hours PRN Mild Pain (1 - 3)  dextrose 40% Gel 15 Gram(s) Oral once PRN Blood Glucose LESS THAN 70 milliGRAM(s)/deciliter  glucagon  Injectable 1 milliGRAM(s) IntraMuscular once PRN Glucose LESS THAN 70 milligrams/deciliter  Amiodarone 400mg  q12x 1 week    Allergies    No Known Drug Allergies  Originally Entered as [RASH] reaction to [cashew nut] (Unknown)    Vital Signs Last 24 Hrs    T(F): 96.8  HR: 81  BP: 133/66    RR: 18  SpO2: 96%    PHYSICAL EXAM:      Constitutional: pt alert, oriented but at times forgetful and confused,  NAD    Eyes:  nonicteric    ENMT: dry oral mucosa, dental defects    Neck:  supple, no JVD, no bruits    Back: TH kyphosis    Respiratory:  shallow respirations, scattered rhonchi, no wheezing, crackles or rales    Cardiovascular:  S1S2 + II/VI BRYAN    Gastrointestinal:  globose, soft and benign    Genitourinary:  no thomas    Extremities:+ arthritic changes, moves all ext    Vascular:  dec pedal pulses    Neurological:  nonfocal    Skin: posterior scalp hematoma    Lymph Nodes:  not enlarged    LABS:                        8.9    7.8  )-----------( 268             29    141 |  103  |  18  ----------------------------<  105  5.0   |  25  |  0.8  GFR 72, 65  Ca    8.3<L>        Mg     1.7, 2.3  troponin < 0.01  TPro  4.7<L>  /  Alb  3.0<L>  /  TBili  0.3  /  DBili  x   /  AST  12  /  ALT  10  /  AlkPhos  72  12-12    PT/INR - ( 12 Dec 2019 12:00 )   PT: 11.60 sec;   INR: 1.01 ratio         PTT - ( 11 Dec 2019 10:00 )  PTT:32.1 sec      RADIOLOGY & ADDITIONAL TESTS:  CTH:  atrophy, severe microvascular white matter changes, stable lacunar infarcts, L scalp hematoma, calvarium intact    CT of C spine:  multi level DDD, nofx or dislocation  CT of chest and abd:  in summary no acute pathology    EK/14 NSR 92/min, L axis, RBBB    ECHO:  EF  60-65%, mild LVH, mild inc in wall thickness, mild MR/MS, thickening of posterior leaflet. mild-mod TR, mild AI sclerotic aortic valve

## 2019-12-18 VITALS
RESPIRATION RATE: 18 BRPM | TEMPERATURE: 98 F | DIASTOLIC BLOOD PRESSURE: 60 MMHG | SYSTOLIC BLOOD PRESSURE: 131 MMHG | HEART RATE: 86 BPM

## 2019-12-18 LAB
ANION GAP SERPL CALC-SCNC: 13 MMOL/L — SIGNIFICANT CHANGE UP (ref 7–14)
BASOPHILS # BLD AUTO: 0.01 K/UL — SIGNIFICANT CHANGE UP (ref 0–0.2)
BASOPHILS NFR BLD AUTO: 0.3 % — SIGNIFICANT CHANGE UP (ref 0–1)
BUN SERPL-MCNC: 17 MG/DL — SIGNIFICANT CHANGE UP (ref 10–20)
CALCIUM SERPL-MCNC: 8.6 MG/DL — SIGNIFICANT CHANGE UP (ref 8.5–10.1)
CHLORIDE SERPL-SCNC: 104 MMOL/L — SIGNIFICANT CHANGE UP (ref 98–110)
CO2 SERPL-SCNC: 21 MMOL/L — SIGNIFICANT CHANGE UP (ref 17–32)
CREAT SERPL-MCNC: 0.7 MG/DL — SIGNIFICANT CHANGE UP (ref 0.7–1.5)
CULTURE RESULTS: SIGNIFICANT CHANGE UP
EOSINOPHIL # BLD AUTO: 0.06 K/UL — SIGNIFICANT CHANGE UP (ref 0–0.7)
EOSINOPHIL NFR BLD AUTO: 1.5 % — SIGNIFICANT CHANGE UP (ref 0–8)
GLUCOSE BLDC GLUCOMTR-MCNC: 155 MG/DL — HIGH (ref 70–99)
GLUCOSE BLDC GLUCOMTR-MCNC: 244 MG/DL — HIGH (ref 70–99)
GLUCOSE BLDC GLUCOMTR-MCNC: 86 MG/DL — SIGNIFICANT CHANGE UP (ref 70–99)
GLUCOSE SERPL-MCNC: 142 MG/DL — HIGH (ref 70–99)
HCT VFR BLD CALC: 26.6 % — LOW (ref 37–47)
HGB BLD-MCNC: 8.1 G/DL — LOW (ref 12–16)
IMM GRANULOCYTES NFR BLD AUTO: 0.3 % — SIGNIFICANT CHANGE UP (ref 0.1–0.3)
LYMPHOCYTES # BLD AUTO: 0.57 K/UL — LOW (ref 1.2–3.4)
LYMPHOCYTES # BLD AUTO: 14.3 % — LOW (ref 20.5–51.1)
MAGNESIUM SERPL-MCNC: 1.9 MG/DL — SIGNIFICANT CHANGE UP (ref 1.8–2.4)
MCHC RBC-ENTMCNC: 25.2 PG — LOW (ref 27–31)
MCHC RBC-ENTMCNC: 30.5 G/DL — LOW (ref 32–37)
MCV RBC AUTO: 82.6 FL — SIGNIFICANT CHANGE UP (ref 81–99)
MONOCYTES # BLD AUTO: 0.6 K/UL — SIGNIFICANT CHANGE UP (ref 0.1–0.6)
MONOCYTES NFR BLD AUTO: 15 % — HIGH (ref 1.7–9.3)
NEUTROPHILS # BLD AUTO: 2.74 K/UL — SIGNIFICANT CHANGE UP (ref 1.4–6.5)
NEUTROPHILS NFR BLD AUTO: 68.6 % — SIGNIFICANT CHANGE UP (ref 42.2–75.2)
NRBC # BLD: 0 /100 WBCS — SIGNIFICANT CHANGE UP (ref 0–0)
PHOSPHATE SERPL-MCNC: 3.4 MG/DL — SIGNIFICANT CHANGE UP (ref 2.1–4.9)
PLATELET # BLD AUTO: 233 K/UL — SIGNIFICANT CHANGE UP (ref 130–400)
POTASSIUM SERPL-MCNC: 4.4 MMOL/L — SIGNIFICANT CHANGE UP (ref 3.5–5)
POTASSIUM SERPL-SCNC: 4.4 MMOL/L — SIGNIFICANT CHANGE UP (ref 3.5–5)
RBC # BLD: 3.22 M/UL — LOW (ref 4.2–5.4)
RBC # FLD: 17.5 % — HIGH (ref 11.5–14.5)
SODIUM SERPL-SCNC: 138 MMOL/L — SIGNIFICANT CHANGE UP (ref 135–146)
SPECIMEN SOURCE: SIGNIFICANT CHANGE UP
WBC # BLD: 3.99 K/UL — LOW (ref 4.8–10.8)
WBC # FLD AUTO: 3.99 K/UL — LOW (ref 4.8–10.8)

## 2019-12-18 RX ORDER — AMIODARONE HYDROCHLORIDE 400 MG/1
2 TABLET ORAL
Qty: 0 | Refills: 0 | DISCHARGE
Start: 2019-12-18

## 2019-12-18 RX ORDER — DILTIAZEM HCL 120 MG
1 CAPSULE, EXT RELEASE 24 HR ORAL
Qty: 0 | Refills: 0 | DISCHARGE

## 2019-12-18 RX ORDER — AMLODIPINE BESYLATE 2.5 MG/1
1 TABLET ORAL
Qty: 0 | Refills: 0 | DISCHARGE
Start: 2019-12-18

## 2019-12-18 RX ORDER — AMIODARONE HYDROCHLORIDE 400 MG/1
1 TABLET ORAL
Qty: 0 | Refills: 0 | DISCHARGE
Start: 2019-12-18

## 2019-12-18 RX ORDER — METOPROLOL TARTRATE 50 MG
1 TABLET ORAL
Qty: 0 | Refills: 0 | DISCHARGE

## 2019-12-18 RX ORDER — CEFPODOXIME PROXETIL 100 MG
1 TABLET ORAL
Qty: 0 | Refills: 0 | DISCHARGE
Start: 2019-12-18 | End: 2019-12-23

## 2019-12-18 RX ADMIN — Medication 100 MILLIGRAM(S): at 05:33

## 2019-12-18 RX ADMIN — Medication 1: at 08:32

## 2019-12-18 RX ADMIN — Medication 4 UNIT(S): at 08:31

## 2019-12-18 RX ADMIN — Medication 2: at 11:47

## 2019-12-18 RX ADMIN — Medication 100 MILLIGRAM(S): at 17:49

## 2019-12-18 RX ADMIN — AMLODIPINE BESYLATE 5 MILLIGRAM(S): 2.5 TABLET ORAL at 05:32

## 2019-12-18 RX ADMIN — POLYETHYLENE GLYCOL 3350 17 GRAM(S): 17 POWDER, FOR SOLUTION ORAL at 05:32

## 2019-12-18 RX ADMIN — PANTOPRAZOLE SODIUM 40 MILLIGRAM(S): 20 TABLET, DELAYED RELEASE ORAL at 05:33

## 2019-12-18 RX ADMIN — Medication 4 UNIT(S): at 11:47

## 2019-12-18 RX ADMIN — POLYETHYLENE GLYCOL 3350 17 GRAM(S): 17 POWDER, FOR SOLUTION ORAL at 17:49

## 2019-12-18 RX ADMIN — RIVAROXABAN 20 MILLIGRAM(S): KIT at 17:49

## 2019-12-18 RX ADMIN — CHLORHEXIDINE GLUCONATE 1 APPLICATION(S): 213 SOLUTION TOPICAL at 05:32

## 2019-12-18 RX ADMIN — AMIODARONE HYDROCHLORIDE 400 MILLIGRAM(S): 400 TABLET ORAL at 17:48

## 2019-12-18 RX ADMIN — AMIODARONE HYDROCHLORIDE 400 MILLIGRAM(S): 400 TABLET ORAL at 05:33

## 2019-12-18 RX ADMIN — Medication 100 MILLIGRAM(S): at 13:22

## 2019-12-18 NOTE — DISCHARGE NOTE PROVIDER - NSDCMRMEDTOKEN_GEN_ALL_CORE_FT
amiodarone 200 mg oral tablet: 2 tab(s) orally 2 times a day for 4 days,  then 1 tab 2 times a day for 7 days,  then 1 tab daily  amLODIPine 5 mg oral tablet: 1 tab(s) orally once a day  cefpodoxime 100 mg oral tablet: 1 tab(s) orally every 12 hours, stop after 5 days on 12/23/19   docusate sodium 100 mg oral capsule: 1 cap(s) orally 3 times a day  glimepiride 2 mg oral tablet: 1 tab(s) orally once a day  metFORMIN 500 mg oral tablet: 1 tab(s) orally once a day   Protonix 40 mg oral delayed release tablet: 1 tab(s) orally once a day  rivaroxaban 20 mg oral tablet: 1 tab(s) orally every 24 hours  simvastatin 40 mg oral tablet: 1 tab(s) orally once a day (at bedtime)

## 2019-12-18 NOTE — DISCHARGE NOTE PROVIDER - HOSPITAL COURSE
89 y/o F w/ a pmh of HTN, afib on xerelto, DMT2, DLD, who presents to the ED with a complaint of a mechanical fall in which she hit the back of her head. In ED trauma work up: She was afebrile normotensive. EKG showed sinus laura and rbbb. Labs unremarkable. CT head showed extracalvarial scalp soft tissue swelling/hematoma, CT cervical spine negative, ct abdomen pelvis/chest negative.          Hospital course complicated by rapid response for lethargy 2/2 bradycardia to 30s, EKG showed  3rd degree AV block. Given 0.5 IV atropine x 2 given and 3mg IV Glucagon. Was upgraded to  the unit where she had no further events, Patient downgraded to tele. EP reccomended amiodarone as AV block most likely caused by large doses of beta and calcium blockers. Patient  also experienced severe delirium and found to have a positive U/A but urine culture negative. Patient was treated empirically with PO antibiotics. Patient continued to be stable on tele monitoring. Patient was discharged to SNF.

## 2019-12-18 NOTE — CHART NOTE - NSCHARTNOTEFT_GEN_A_CORE
<<<RESIDENT DISCHARGE NOTE>>>     LATRICIA SUÁREZ  MRN-[id number]    VITAL SIGNS:  T(F): 98 (12-18-19 @ 13:10), Max: 98 (12-18-19 @ 13:10)  HR: 86 (12-18-19 @ 13:10)  BP: 131/60 (12-18-19 @ 13:10)  SpO2: 98% (12-18-19 @ 08:53)      PHYSICAL EXAMINATION:  General: NAD  Head & Neck: NCAT  Pulmonary: CTA b/l  Cardiovascular: Regular rate & rhythm, normal s1 and s2  Gastrointestinal/Abdomen & Pelvis: Soft, non tender, non distended, normal bowel sounds  Neurologic/Motor: A&O x 3    TEST RESULTS:                        8.1    3.99  )-----------( 233      ( 18 Dec 2019 06:42 )             26.6       12-18    138  |  104  |  17  ----------------------------<  142<H>  4.4   |  21  |  0.7    Ca    8.6      18 Dec 2019 06:42  Phos  3.4     12-18  Mg     1.9     12-18        FINAL DISCHARGE INTERVIEW:  Resident(s) Present: (Name: Smiley Sam, RN Present: (Name: Mirta)    DISCHARGE MEDICATION RECONCILIATION  reviewed with Attending (Name: Dr. Macario)    DISPOSITION:   [  ] Home,    [  ] Home with Visiting Nursing Services,   [x]  SNF/ NH,    [   ] Acute Rehab (4A),   [   ] Other (Specify:_________)

## 2019-12-18 NOTE — PROGRESS NOTE ADULT - CARDIOVASCULAR
Regular rate & rhythm, normal S1, S2; no murmurs, gallops or rubs; no S3, S4
Regular rate & rhythm, normal S1, S2; no murmurs, gallops or rubs; no S3, S4
detailed exam

## 2019-12-18 NOTE — DISCHARGE NOTE PROVIDER - CARE PROVIDERS DIRECT ADDRESSES
,[email or web address]

## 2019-12-18 NOTE — PROGRESS NOTE ADULT - ATTENDING COMMENTS
follow on meds w/ plan as per eps.
pt w/o cp, sob w/plan as per eps.
pt sinus w/ plan as per eps.
pt sinus. plan as per eps.
pt w/o cp,sob. follow on meds.

## 2019-12-18 NOTE — DISCHARGE NOTE NURSING/CASE MANAGEMENT/SOCIAL WORK - PATIENT PORTAL LINK FT
You can access the FollowMyHealth Patient Portal offered by [facility] by registering at the following website: [email or web address]. By joining [employer]’s FollowMyHealth portal, you will also be able to view your health information using other applications (apps) compatible with our system.

## 2019-12-18 NOTE — DISCHARGE NOTE PROVIDER - CARE PROVIDER_API CALL
Yandel Rubio)  Internal Medicine  [address] [facility] [facility] [address]  [place] [address]  Phone: [phone]  Fax: [phone]  Follow Up Time:     Sammy Larose)  Cardiology; Internal Medicine  [address] [place] [address], [place] [address] [place] [address]  Phone: [phone]  Fax: [phone]  Follow Up Time:

## 2019-12-18 NOTE — PROGRESS NOTE ADULT - REASON FOR ADMISSION
Fall
Fall, bradyarrhythmia
Fall, bradyarrhythmia
Sp Fall at home, cleared by Trauma
sp Fall at home, bradyarrhythmia
sp fall at  home, laura arrhythmia
Fall

## 2019-12-18 NOTE — PROGRESS NOTE ADULT - SUBJECTIVE AND OBJECTIVE BOX
SUBJ:No chest pain or shortness of breath      MEDICATIONS  (STANDING):  aMIOdarone    Tablet   Oral   aMIOdarone    Tablet 400 milliGRAM(s) Oral two times a day  amLODIPine   Tablet 5 milliGRAM(s) Oral daily  atorvastatin 20 milliGRAM(s) Oral at bedtime  cefpodoxime 100 milliGRAM(s) Oral every 12 hours  chlorhexidine 4% Liquid 1 Application(s) Topical <User Schedule>  dextrose 5%. 1000 milliLiter(s) (50 mL/Hr) IV Continuous <Continuous>  dextrose 50% Injectable 12.5 Gram(s) IV Push once  dextrose 50% Injectable 25 Gram(s) IV Push once  dextrose 50% Injectable 25 Gram(s) IV Push once  docusate sodium Oral Tab/Cap - Peds 100 milliGRAM(s) Oral three times a day  insulin glargine Injectable (LANTUS) 10 Unit(s) SubCutaneous at bedtime  insulin lispro (HumaLOG) corrective regimen sliding scale   SubCutaneous three times a day before meals  insulin lispro Injectable (HumaLOG) 4 Unit(s) SubCutaneous three times a day before meals  pantoprazole    Tablet 40 milliGRAM(s) Oral before breakfast  polyethylene glycol 3350 17 Gram(s) Oral two times a day  rivaroxaban 20 milliGRAM(s) Oral with dinner    MEDICATIONS  (PRN):  acetaminophen   Tablet .. 650 milliGRAM(s) Oral every 6 hours PRN Mild Pain (1 - 3)  dextrose 40% Gel 15 Gram(s) Oral once PRN Blood Glucose LESS THAN 70 milliGRAM(s)/deciliter  glucagon  Injectable 1 milliGRAM(s) IntraMuscular once PRN Glucose LESS THAN 70 milligrams/deciliter  LORazepam   Injectable 0.5 milliGRAM(s) IV Push two times a day PRN Agitation            Vital Signs Last 24 Hrs  T(C): 36.7 (18 Dec 2019 13:10), Max: 36.7 (18 Dec 2019 13:10)  T(F): 98 (18 Dec 2019 13:10), Max: 98 (18 Dec 2019 13:10)  HR: 86 (18 Dec 2019 13:10) (73 - 86)  BP: 131/60 (18 Dec 2019 13:10) (131/60 - 175/81)  BP(mean): --  RR: 18 (18 Dec 2019 13:10) (18 - 18)  SpO2: 98% (18 Dec 2019 08:53) (98% - 99%)      ECG:NML    TTE:    LABS:                        8.1    3.99  )-----------( 233      ( 18 Dec 2019 06:42 )             26.6     12-18    138  |  104  |  17  ----------------------------<  142<H>  4.4   |  21  |  0.7    Ca    8.6      18 Dec 2019 06:42  Phos  3.4     12-18  Mg     1.9     12-18              I&O's Summary    17 Dec 2019 07:01  -  18 Dec 2019 07:00  --------------------------------------------------------  IN: 310 mL / OUT: 600 mL / NET: -290 mL    18 Dec 2019 07:01  -  18 Dec 2019 16:43  --------------------------------------------------------  IN: 760 mL / OUT: 300 mL / NET: 460 mL      BNP

## 2019-12-18 NOTE — DISCHARGE NOTE PROVIDER - NSDCCPCAREPLAN_GEN_ALL_CORE_FT
PRINCIPAL DISCHARGE DIAGNOSIS  Diagnosis: Third degree AV block  Assessment and Plan of Treatment: You were found to have 3rd degree AV block. You were started on amiodarone to help control your hearts rhythm. Please continue to take your medications as prescribed and follow up with electrophysiologist in 1-2 weeks.         SECONDARY DISCHARGE DIAGNOSES  Diagnosis: Atrial fibrillation  Assessment and Plan of Treatment: You have a history of atrial fibrillation. Please take your blood thinner xarelto and follow up with your primary care doctor in 1-2 weeks.  Your metoprolol and diltiazem was discontinued as they can further worsen your 3rd degree heart block.    Diagnosis: Scalp hematoma  Assessment and Plan of Treatment: You have a mechanical fall prior to your hospitalization in which you hit your head. You developed a scalp hematoma as a result. Please follow up with your primary care doctor in 1-2 weeks.    Diagnosis: Diabetes mellitus  Assessment and Plan of Treatment: You have a history of diabetes. Please take your medications as prescribed and follow up with your primary care doctor in 1-2 weeks.

## 2019-12-19 ENCOUNTER — OUTPATIENT (OUTPATIENT)
Dept: OUTPATIENT SERVICES | Facility: HOSPITAL | Age: 84
LOS: 1 days | Discharge: HOME | End: 2019-12-19

## 2019-12-19 DIAGNOSIS — Z98.890 OTHER SPECIFIED POSTPROCEDURAL STATES: Chronic | ICD-10-CM

## 2019-12-19 LAB
HEMOGLOBIN INTERPRETATION: SIGNIFICANT CHANGE UP
HGB A MFR BLD: 98.1 % — HIGH (ref 95.8–98)
HGB A2 MFR BLD: 1.9 % — LOW (ref 2–3.2)

## 2019-12-20 DIAGNOSIS — R79.9 ABNORMAL FINDING OF BLOOD CHEMISTRY, UNSPECIFIED: ICD-10-CM

## 2019-12-24 DIAGNOSIS — R55 SYNCOPE AND COLLAPSE: ICD-10-CM

## 2019-12-24 DIAGNOSIS — R29.6 REPEATED FALLS: ICD-10-CM
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
## 2019-12-24 DIAGNOSIS — R91.1 SOLITARY PULMONARY NODULE: ICD-10-CM

## 2019-12-24 DIAGNOSIS — I10 ESSENTIAL (PRIMARY) HYPERTENSION: ICD-10-CM

## 2019-12-24 DIAGNOSIS — D64.9 ANEMIA, UNSPECIFIED: ICD-10-CM

## 2019-12-24 DIAGNOSIS — E78.5 HYPERLIPIDEMIA, UNSPECIFIED: ICD-10-CM

## 2019-12-24 DIAGNOSIS — F01.50 VASCULAR DEMENTIA WITHOUT BEHAVIORAL DISTURBANCE: ICD-10-CM

## 2019-12-24 DIAGNOSIS — I48.0 PAROXYSMAL ATRIAL FIBRILLATION: ICD-10-CM

## 2019-12-24 DIAGNOSIS — E11.40 TYPE 2 DIABETES MELLITUS WITH DIABETIC NEUROPATHY, UNSPECIFIED: ICD-10-CM

## 2019-12-24 DIAGNOSIS — Z79.01 LONG TERM (CURRENT) USE OF ANTICOAGULANTS: ICD-10-CM
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
## 2019-12-24 DIAGNOSIS — Z98.890 OTHER SPECIFIED POSTPROCEDURAL STATES: ICD-10-CM

## 2019-12-24 DIAGNOSIS — S00.03XA CONTUSION OF SCALP, INITIAL ENCOUNTER: ICD-10-CM

## 2019-12-24 DIAGNOSIS — R00.1 BRADYCARDIA, UNSPECIFIED: ICD-10-CM

## 2019-12-24 DIAGNOSIS — Z79.84 LONG TERM (CURRENT) USE OF ORAL HYPOGLYCEMIC DRUGS: ICD-10-CM

## 2019-12-24 DIAGNOSIS — Z90.49 ACQUIRED ABSENCE OF OTHER SPECIFIED PARTS OF DIGESTIVE TRACT: ICD-10-CM

## 2019-12-24 DIAGNOSIS — F05 DELIRIUM DUE TO KNOWN PHYSIOLOGICAL CONDITION: ICD-10-CM

## 2019-12-24 DIAGNOSIS — Z96.642 PRESENCE OF LEFT ARTIFICIAL HIP JOINT: ICD-10-CM

## 2019-12-24 DIAGNOSIS — I44.2 ATRIOVENTRICULAR BLOCK, COMPLETE: ICD-10-CM

## 2019-12-24 DIAGNOSIS — T44.5X5A ADVERSE EFFECT OF PREDOMINANTLY BETA-ADRENORECEPTOR AGONISTS, INITIAL ENCOUNTER: ICD-10-CM

## 2020-04-29 NOTE — PRE-ANESTHESIA EVALUATION ADULT - HEIGHT IN FEET
5
5
Rifampin Pregnancy And Lactation Text: This medication is Pregnancy Category C and it isn't know if it is safe during pregnancy. It is also excreted in breast milk and should not be used if you are breast feeding.

## 2020-05-10 ENCOUNTER — INPATIENT (INPATIENT)
Facility: HOSPITAL | Age: 85
LOS: 2 days | Discharge: HOME | End: 2020-05-13
Attending: HOSPITALIST | Admitting: HOSPITALIST
Payer: MEDICARE

## 2020-05-10 VITALS
SYSTOLIC BLOOD PRESSURE: 106 MMHG | HEIGHT: 66 IN | HEART RATE: 154 BPM | TEMPERATURE: 96 F | OXYGEN SATURATION: 97 % | RESPIRATION RATE: 20 BRPM | DIASTOLIC BLOOD PRESSURE: 62 MMHG | WEIGHT: 149.91 LBS

## 2020-05-10 DIAGNOSIS — Z98.890 OTHER SPECIFIED POSTPROCEDURAL STATES: Chronic | ICD-10-CM

## 2020-05-10 LAB
ALBUMIN SERPL ELPH-MCNC: 2.2 G/DL — LOW (ref 3.5–5.2)
ALP SERPL-CCNC: 101 U/L — SIGNIFICANT CHANGE UP (ref 30–115)
ALT FLD-CCNC: 19 U/L — SIGNIFICANT CHANGE UP (ref 0–41)
ANION GAP SERPL CALC-SCNC: 15 MMOL/L — HIGH (ref 7–14)
APPEARANCE UR: CLEAR — SIGNIFICANT CHANGE UP
APTT BLD: 40.2 SEC — HIGH (ref 27–39.2)
AST SERPL-CCNC: 22 U/L — SIGNIFICANT CHANGE UP (ref 0–41)
B-OH-BUTYR SERPL-SCNC: <0.2 MMOL/L — SIGNIFICANT CHANGE UP
BACTERIA # UR AUTO: ABNORMAL
BASE EXCESS BLDV CALC-SCNC: 5.8 MMOL/L — HIGH (ref -2–2)
BASOPHILS # BLD AUTO: 0.01 K/UL — SIGNIFICANT CHANGE UP (ref 0–0.2)
BASOPHILS NFR BLD AUTO: 0.1 % — SIGNIFICANT CHANGE UP (ref 0–1)
BILIRUB SERPL-MCNC: 0.2 MG/DL — SIGNIFICANT CHANGE UP (ref 0.2–1.2)
BILIRUB UR-MCNC: NEGATIVE — SIGNIFICANT CHANGE UP
BLD GP AB SCN SERPL QL: SIGNIFICANT CHANGE UP
BUN SERPL-MCNC: 25 MG/DL — HIGH (ref 10–20)
CA-I SERPL-SCNC: 1.03 MMOL/L — LOW (ref 1.12–1.3)
CALCIUM SERPL-MCNC: 7.3 MG/DL — LOW (ref 8.5–10.1)
CHLORIDE SERPL-SCNC: 93 MMOL/L — LOW (ref 98–110)
CO2 SERPL-SCNC: 24 MMOL/L — SIGNIFICANT CHANGE UP (ref 17–32)
COLOR SPEC: SIGNIFICANT CHANGE UP
CREAT SERPL-MCNC: 1.1 MG/DL — SIGNIFICANT CHANGE UP (ref 0.7–1.5)
DIFF PNL FLD: SIGNIFICANT CHANGE UP
EOSINOPHIL # BLD AUTO: 0.03 K/UL — SIGNIFICANT CHANGE UP (ref 0–0.7)
EOSINOPHIL NFR BLD AUTO: 0.4 % — SIGNIFICANT CHANGE UP (ref 0–8)
EPI CELLS # UR: 0 /HPF — SIGNIFICANT CHANGE UP (ref 0–5)
GAS PNL BLDV: 128 MMOL/L — LOW (ref 136–145)
GAS PNL BLDV: SIGNIFICANT CHANGE UP
GLUCOSE BLDC GLUCOMTR-MCNC: 300 MG/DL — HIGH (ref 70–99)
GLUCOSE SERPL-MCNC: 388 MG/DL — HIGH (ref 70–99)
GLUCOSE UR QL: ABNORMAL
HCO3 BLDV-SCNC: 30 MMOL/L — HIGH (ref 22–29)
HCT VFR BLD CALC: 33.3 % — LOW (ref 37–47)
HCT VFR BLDA CALC: 38.3 % — SIGNIFICANT CHANGE UP (ref 34–44)
HGB BLD CALC-MCNC: 12.5 G/DL — LOW (ref 14–18)
HGB BLD-MCNC: 11.4 G/DL — LOW (ref 12–16)
HYALINE CASTS # UR AUTO: 11 /LPF — HIGH (ref 0–7)
IMM GRANULOCYTES NFR BLD AUTO: 0.6 % — HIGH (ref 0.1–0.3)
INR BLD: 2.43 RATIO — HIGH (ref 0.65–1.3)
KETONES UR-MCNC: NEGATIVE — SIGNIFICANT CHANGE UP
LACTATE BLDV-MCNC: 3.8 MMOL/L — HIGH (ref 0.5–1.6)
LEUKOCYTE ESTERASE UR-ACNC: ABNORMAL
LYMPHOCYTES # BLD AUTO: 0.79 K/UL — LOW (ref 1.2–3.4)
LYMPHOCYTES # BLD AUTO: 10.9 % — LOW (ref 20.5–51.1)
MAGNESIUM SERPL-MCNC: 1.5 MG/DL — LOW (ref 1.8–2.4)
MCHC RBC-ENTMCNC: 33.3 PG — HIGH (ref 27–31)
MCHC RBC-ENTMCNC: 34.2 G/DL — SIGNIFICANT CHANGE UP (ref 32–37)
MCV RBC AUTO: 97.4 FL — SIGNIFICANT CHANGE UP (ref 81–99)
MONOCYTES # BLD AUTO: 0.56 K/UL — SIGNIFICANT CHANGE UP (ref 0.1–0.6)
MONOCYTES NFR BLD AUTO: 7.7 % — SIGNIFICANT CHANGE UP (ref 1.7–9.3)
NEUTROPHILS # BLD AUTO: 5.84 K/UL — SIGNIFICANT CHANGE UP (ref 1.4–6.5)
NEUTROPHILS NFR BLD AUTO: 80.3 % — HIGH (ref 42.2–75.2)
NITRITE UR-MCNC: NEGATIVE — SIGNIFICANT CHANGE UP
NRBC # BLD: 0 /100 WBCS — SIGNIFICANT CHANGE UP (ref 0–0)
NT-PROBNP SERPL-SCNC: 3143 PG/ML — HIGH (ref 0–300)
PCO2 BLDV: 42 MMHG — SIGNIFICANT CHANGE UP (ref 41–51)
PH BLDV: 7.46 — HIGH (ref 7.26–7.43)
PH UR: 6 — SIGNIFICANT CHANGE UP (ref 5–8)
PHOSPHATE SERPL-MCNC: 3.5 MG/DL — SIGNIFICANT CHANGE UP (ref 2.1–4.9)
PLATELET # BLD AUTO: 197 K/UL — SIGNIFICANT CHANGE UP (ref 130–400)
PO2 BLDV: 33 MMHG — SIGNIFICANT CHANGE UP (ref 20–40)
POTASSIUM BLDV-SCNC: 3.2 MMOL/L — LOW (ref 3.3–5.6)
POTASSIUM SERPL-MCNC: 3.5 MMOL/L — SIGNIFICANT CHANGE UP (ref 3.5–5)
POTASSIUM SERPL-SCNC: 3.5 MMOL/L — SIGNIFICANT CHANGE UP (ref 3.5–5)
PROT SERPL-MCNC: 4.1 G/DL — LOW (ref 6–8)
PROT UR-MCNC: SIGNIFICANT CHANGE UP
PROTHROM AB SERPL-ACNC: 27.9 SEC — HIGH (ref 9.95–12.87)
RBC # BLD: 3.42 M/UL — LOW (ref 4.2–5.4)
RBC # FLD: 14.6 % — HIGH (ref 11.5–14.5)
RBC CASTS # UR COMP ASSIST: 1 /HPF — SIGNIFICANT CHANGE UP (ref 0–4)
SAO2 % BLDV: 62 % — SIGNIFICANT CHANGE UP
SODIUM SERPL-SCNC: 132 MMOL/L — LOW (ref 135–146)
SP GR SPEC: 1.01 — SIGNIFICANT CHANGE UP (ref 1.01–1.02)
TROPONIN T SERPL-MCNC: <0.01 NG/ML — SIGNIFICANT CHANGE UP
UROBILINOGEN FLD QL: SIGNIFICANT CHANGE UP
WBC # BLD: 7.27 K/UL — SIGNIFICANT CHANGE UP (ref 4.8–10.8)
WBC # FLD AUTO: 7.27 K/UL — SIGNIFICANT CHANGE UP (ref 4.8–10.8)
WBC UR QL: 40 /HPF — HIGH (ref 0–5)

## 2020-05-10 PROCEDURE — 71045 X-RAY EXAM CHEST 1 VIEW: CPT | Mod: 26

## 2020-05-10 PROCEDURE — 99291 CRITICAL CARE FIRST HOUR: CPT | Mod: GC

## 2020-05-10 PROCEDURE — 93010 ELECTROCARDIOGRAM REPORT: CPT | Mod: 76

## 2020-05-10 RX ORDER — GLIMEPIRIDE 1 MG
1 TABLET ORAL
Qty: 0 | Refills: 0 | DISCHARGE

## 2020-05-10 RX ORDER — GLUCAGON INJECTION, SOLUTION 0.5 MG/.1ML
1 INJECTION, SOLUTION SUBCUTANEOUS ONCE
Refills: 0 | Status: DISCONTINUED | OUTPATIENT
Start: 2020-05-10 | End: 2020-05-13

## 2020-05-10 RX ORDER — FERROUS SULFATE 325(65) MG
1 TABLET ORAL
Qty: 0 | Refills: 0 | DISCHARGE

## 2020-05-10 RX ORDER — TAMSULOSIN HYDROCHLORIDE 0.4 MG/1
0.4 CAPSULE ORAL AT BEDTIME
Refills: 0 | Status: DISCONTINUED | OUTPATIENT
Start: 2020-05-10 | End: 2020-05-12

## 2020-05-10 RX ORDER — SODIUM CHLORIDE 9 MG/ML
1000 INJECTION INTRAMUSCULAR; INTRAVENOUS; SUBCUTANEOUS
Refills: 0 | Status: DISCONTINUED | OUTPATIENT
Start: 2020-05-10 | End: 2020-05-11

## 2020-05-10 RX ORDER — DILTIAZEM HCL 120 MG
60 CAPSULE, EXT RELEASE 24 HR ORAL ONCE
Refills: 0 | Status: COMPLETED | OUTPATIENT
Start: 2020-05-10 | End: 2020-05-10

## 2020-05-10 RX ORDER — DEXTROSE 50 % IN WATER 50 %
25 SYRINGE (ML) INTRAVENOUS ONCE
Refills: 0 | Status: DISCONTINUED | OUTPATIENT
Start: 2020-05-10 | End: 2020-05-13

## 2020-05-10 RX ORDER — ATORVASTATIN CALCIUM 80 MG/1
40 TABLET, FILM COATED ORAL AT BEDTIME
Refills: 0 | Status: DISCONTINUED | OUTPATIENT
Start: 2020-05-10 | End: 2020-05-13

## 2020-05-10 RX ORDER — METFORMIN HYDROCHLORIDE 850 MG/1
1 TABLET ORAL
Qty: 0 | Refills: 0 | DISCHARGE

## 2020-05-10 RX ORDER — DEXTROSE 50 % IN WATER 50 %
15 SYRINGE (ML) INTRAVENOUS ONCE
Refills: 0 | Status: DISCONTINUED | OUTPATIENT
Start: 2020-05-10 | End: 2020-05-13

## 2020-05-10 RX ORDER — SODIUM CHLORIDE 9 MG/ML
1000 INJECTION, SOLUTION INTRAVENOUS
Refills: 0 | Status: DISCONTINUED | OUTPATIENT
Start: 2020-05-10 | End: 2020-05-13

## 2020-05-10 RX ORDER — INSULIN LISPRO 100/ML
6 VIAL (ML) SUBCUTANEOUS ONCE
Refills: 0 | Status: COMPLETED | OUTPATIENT
Start: 2020-05-10 | End: 2020-05-10

## 2020-05-10 RX ORDER — AMIODARONE HYDROCHLORIDE 400 MG/1
200 TABLET ORAL DAILY
Refills: 0 | Status: DISCONTINUED | OUTPATIENT
Start: 2020-05-10 | End: 2020-05-13

## 2020-05-10 RX ORDER — TAMSULOSIN HYDROCHLORIDE 0.4 MG/1
1 CAPSULE ORAL
Qty: 0 | Refills: 0 | DISCHARGE

## 2020-05-10 RX ORDER — SIMVASTATIN 20 MG/1
1 TABLET, FILM COATED ORAL
Qty: 0 | Refills: 0 | DISCHARGE

## 2020-05-10 RX ORDER — DEXTROSE 50 % IN WATER 50 %
12.5 SYRINGE (ML) INTRAVENOUS ONCE
Refills: 0 | Status: DISCONTINUED | OUTPATIENT
Start: 2020-05-10 | End: 2020-05-13

## 2020-05-10 RX ORDER — INSULIN LISPRO 100/ML
5 VIAL (ML) SUBCUTANEOUS
Refills: 0 | Status: DISCONTINUED | OUTPATIENT
Start: 2020-05-10 | End: 2020-05-13

## 2020-05-10 RX ORDER — INSULIN GLARGINE 100 [IU]/ML
13 INJECTION, SOLUTION SUBCUTANEOUS AT BEDTIME
Refills: 0 | Status: DISCONTINUED | OUTPATIENT
Start: 2020-05-10 | End: 2020-05-13

## 2020-05-10 RX ORDER — RIVAROXABAN 15 MG-20MG
20 KIT ORAL
Refills: 0 | Status: DISCONTINUED | OUTPATIENT
Start: 2020-05-10 | End: 2020-05-13

## 2020-05-10 RX ORDER — SODIUM CHLORIDE 9 MG/ML
1000 INJECTION, SOLUTION INTRAVENOUS ONCE
Refills: 0 | Status: COMPLETED | OUTPATIENT
Start: 2020-05-10 | End: 2020-05-10

## 2020-05-10 RX ORDER — PANTOPRAZOLE SODIUM 20 MG/1
1 TABLET, DELAYED RELEASE ORAL
Qty: 0 | Refills: 0 | DISCHARGE

## 2020-05-10 RX ORDER — CHLORHEXIDINE GLUCONATE 213 G/1000ML
1 SOLUTION TOPICAL
Refills: 0 | Status: DISCONTINUED | OUTPATIENT
Start: 2020-05-10 | End: 2020-05-13

## 2020-05-10 RX ORDER — PANTOPRAZOLE SODIUM 20 MG/1
40 TABLET, DELAYED RELEASE ORAL
Refills: 0 | Status: DISCONTINUED | OUTPATIENT
Start: 2020-05-10 | End: 2020-05-13

## 2020-05-10 RX ORDER — MAGNESIUM SULFATE 500 MG/ML
2 VIAL (ML) INJECTION ONCE
Refills: 0 | Status: COMPLETED | OUTPATIENT
Start: 2020-05-10 | End: 2020-05-10

## 2020-05-10 RX ORDER — REPAGLINIDE 1 MG/1
1 TABLET ORAL
Qty: 0 | Refills: 0 | DISCHARGE

## 2020-05-10 RX ORDER — CEFTRIAXONE 500 MG/1
1000 INJECTION, POWDER, FOR SOLUTION INTRAMUSCULAR; INTRAVENOUS EVERY 24 HOURS
Refills: 0 | Status: DISCONTINUED | OUTPATIENT
Start: 2020-05-10 | End: 2020-05-13

## 2020-05-10 RX ORDER — INSULIN LISPRO 100/ML
VIAL (ML) SUBCUTANEOUS
Refills: 0 | Status: DISCONTINUED | OUTPATIENT
Start: 2020-05-10 | End: 2020-05-13

## 2020-05-10 RX ORDER — DILTIAZEM HCL 120 MG
10 CAPSULE, EXT RELEASE 24 HR ORAL ONCE
Refills: 0 | Status: COMPLETED | OUTPATIENT
Start: 2020-05-10 | End: 2020-05-10

## 2020-05-10 RX ADMIN — Medication 50 GRAM(S): at 20:33

## 2020-05-10 RX ADMIN — Medication 10 MILLIGRAM(S): at 19:45

## 2020-05-10 RX ADMIN — CEFTRIAXONE 100 MILLIGRAM(S): 500 INJECTION, POWDER, FOR SOLUTION INTRAMUSCULAR; INTRAVENOUS at 22:58

## 2020-05-10 RX ADMIN — SODIUM CHLORIDE 1000 MILLILITER(S): 9 INJECTION, SOLUTION INTRAVENOUS at 19:53

## 2020-05-10 RX ADMIN — Medication 60 MILLIGRAM(S): at 20:27

## 2020-05-10 RX ADMIN — Medication 6 UNIT(S): at 22:58

## 2020-05-10 RX ADMIN — SODIUM CHLORIDE 75 MILLILITER(S): 9 INJECTION INTRAMUSCULAR; INTRAVENOUS; SUBCUTANEOUS at 22:58

## 2020-05-10 RX ADMIN — INSULIN GLARGINE 13 UNIT(S): 100 INJECTION, SOLUTION SUBCUTANEOUS at 22:58

## 2020-05-10 NOTE — ED PROVIDER NOTE - CLINICAL SUMMARY MEDICAL DECISION MAKING FREE TEXT BOX
Patient presented from NH with tachycardia, hypotension. On arrival BP normotensive but patient found to be in afib with RVR. Afebrile on arrival. Given cardizem IVP with successful rate control on subsequent EKG. Obtained labs which showed (+) hyperglycemia but no evidence of DKA and were otherwise grossly unremarkable including no significant leukocytosis, anemia, signs of dehydration/AARON, transaminitis or significant electrolyte abnormalities. UA showed (+) UTI which is possible cause of afib with RVR. Patient remained HD stable after cardizem without signs of deterioration. Started on IV abx, and will admit for further monitoring and management. Patient agreeable with plan. HD stable at time of admission.

## 2020-05-10 NOTE — ED PROVIDER NOTE - BIRTH SEX
December 13, 2018     Patient: Felix Quinn   YOB: 1984   Date of Visit: 12/13/2018       To Whom it May Concern:    Felix Quinn is under my professional care  He was seen in my office on 12/13/2018  He is to remain out of work until his next visit with me x 2 weeks  If you have any questions or concerns, please don't hesitate to call           Sincerely,          Michael Mora MD        CC: Felix Cheyenne
Female

## 2020-05-10 NOTE — H&P ADULT - HISTORY OF PRESENT ILLNESS
88 y/o female w/ a pmh of HTN, DMT2, DLD, Afib on xarelto, h/o symptomatic bradycardia with AV gavin blocking agents last admission presented to the ED for EKG changes and hyperglycemia to 500s in NH.        As per ED charts, pt was found to be in Afib with RVR with HR in 150s and BP 87/52 mmhg. Pt was given Diltiazem 60 x 1 time dose followed by iv 10 mg, 2 gm Mg, LR bolus. Pt's BP and heart rate improved. Pt was also found to be hyperglycemic in 300s, beta hydroxybutyrate negative. BNP 3000s, CXR showed no congestion. COVID swab is sent 88 y/o female w/ a pmh of HTN, DMT2, DLD, Afib on xarelto, h/o symptomatic bradycardia with AV gavin blocking agents last admission presented to the ED for EKG changes and hyperglycemia to 500s in NH.    Pt is AAO x 3 on my encounter, very hard of hearing. States she has been having "trouble urinating" x 2 weeks. Pt endorses increased frequency and suprapubic tenderness. Also endorses feeling weak and having constipation for the same duration. States thomas catheter was placed at the NH but  Pt denies having fever, dysuria, sick contact, recent travel, nausea, vomiting, chest pain, palpitations, shortness of breath, cough.  Pt states she has been compliant with her medications. Son helps her with decision making, not sure about code status as of now .  As per NH charts, pt was found to be hyperglycemic to 417, was given 10 units insulin. pt was also found to be in Afib with RVR with HR in 150s and BP 87/52 mmhg. In the ED, Pt was given Diltiazem 60 x 1 time dose followed by iv 10 mg, 2 gm Mg, LR bolus. Pt's BP and heart rate improved. Pt was also found to be hyperglycemic in 300s, beta hydroxybutyrate negative. BNP 3000s, CXR showed no congestion. COVID swab is sent

## 2020-05-10 NOTE — H&P ADULT - ASSESSMENT
88 y/o female w/ a pmh of HTN, DMT2, DLD, Afib on xarelto, h/o symptomatic bradycardia with AV gavin blocking agents last admission presented to the ED for EKG changes and hyperglycemia to 500s in NH. Pt endorses increased frequency and suprapubic tenderness.   pt was found to be in Afib with RVR with HR in 150s and BP 87/52 mmhg.   In the ED, Pt was given Diltiazem 60 x 1 time dose followed by iv 10 mg, 2 gm Mg, LR bolus. Pt's BP and heart rate improved. lactate 3.8, Pt was also found to be hyperglycemic in 300s, beta hydroxybutyrate negative. BNP 3000s, CXR showed no congestion. COVID swab is sent.        # Sepsis likely secondary to acute cystitis  fu UA, urine culture and blood culture  cw rocephin for now  fu renal usg  cw ivf  thomas catheter for now      # Afib with RVR  admit to tele  s/p po cardizem 60 and iv 10 mg  likely secondary to acute cystitis  cw xarelto and amiodarone  avoid over dose of AV gavin blocking gents as pt ha sh/o symptomatic bradycardia last adm, Pt not on any AV gavin blocking agents at home. HR in 70s on monitor now, continue to monitor for now  [id number] score is 5  elevated BNP, cw torsemide (home med) for now  Cardio Dr Gates, dillon not indicated as of now      #HO DM controlled   insulin regimen   FS AC+ HS      #HO HTN  amlodipine was stopped last adm due to symptomatic laura  BP stable now, when pt stable and anti HTN needed can start with ACE / ARB     #HO DLD  cw statin         DVT ppx: xarelto  GI ppx: protonix   Code Status: full code  DISPO: acute 88 y/o female w/ a pmh of HTN, DMT2, DLD, Afib on xarelto, h/o symptomatic bradycardia with AV gavin blocking agents last admission presented to the ED for EKG changes and hyperglycemia to 500s in NH. Pt endorses increased frequency and suprapubic tenderness.   pt was found to be in Afib with RVR with HR in 150s and BP 87/52 mmhg.   In the ED, Pt was given Diltiazem 60 x 1 time dose followed by iv 10 mg, 2 gm Mg, LR bolus. Pt's BP and heart rate improved. lactate 3.8, Pt was also found to be hyperglycemic in 300s, beta hydroxybutyrate negative. BNP 3000s, CXR showed no congestion. COVID swab is sent.        # Sepsis likely secondary to acute cystitis  fu UA, urine culture and blood culture  cw rocephin for now  fu renal usg  cw ivf  thomas catheter for now      # Afib with RVR  admit to tele  s/p po cardizem 60 and iv 10 mg  likely secondary to acute cystitis  fu cardiac enzymes, Pulse O2 stable on room air  cw xarelto and amiodarone  avoid over dose of AV gavin blocking gents as pt ha sh/o symptomatic bradycardia last adm, Pt not on any AV gavin blocking agents at home. HR in 70s on monitor now, continue to monitor for now  [id number] score is 5  elevated BNP, cw torsemide (home med) for now  Cardio Dr Gates, darrional not indicated as of now      # Hyponatremia  corrected Na 131  likely secondary to hyperglycemia  continue to monitor      #HO DM controlled   insulin regimen   FS AC+ HS      #HO HTN  amlodipine was stopped last adm due to symptomatic laura  BP stable now, when pt stable and anti HTN needed can start with ACE / ARB     #HO DLD  cw statin         DVT ppx: xarelto  GI ppx: protonix   Code Status: full code  DISPO: acute  FULL code 89 y/o female w/ a pmh of HTN, DMT2, DLD, Afib on xarelto, h/o symptomatic bradycardia with AV gavin blocking agents last admission presented to the ED for EKG changes and hyperglycemia to 500s in NH. Pt endorses increased frequency and suprapubic tenderness.   pt was found to be in Afib with RVR with HR in 150s and BP 87/52 mmhg.   In the ED, Pt was given Diltiazem 60 x 1 time dose followed by iv 10 mg, 2 gm Mg, LR bolus. Pt's BP and heart rate improved. lactate 3.8, Pt was also found to be hyperglycemic in 300s, beta hydroxybutyrate negative. BNP 3000s, CXR showed no congestion. COVID swab is sent.        # Sepsis likely secondary to acute cystitis  # Acute kidney injury  fu UA, urine culture and blood culture  cw rocephin for now  fu renal usg  cw ivf  thomas catheter for now      # Afib with RVR  admit to tele  s/p po cardizem 60 and iv 10 mg  likely secondary to acute cystitis  fu cardiac enzymes, Pulse O2 stable on room air  cw xarelto and amiodarone  avoid over dose of AV gavin blocking gents as pt ha sh/o symptomatic bradycardia last adm, Pt not on any AV gavin blocking agents at home. HR in 70s on monitor now, continue to monitor for now  [id number] score is 5  elevated BNP, cw torsemide (home med) for now  Cardio Dr Gates, darrional not indicated as of now      # Hyponatremia  corrected Na 131  likely secondary to hyperglycemia  continue to monitor      #HO DM controlled   insulin regimen   FS AC+ HS      #HO HTN  amlodipine was stopped last adm due to symptomatic laura  BP stable now, when pt stable and anti HTN needed can start with ACE / ARB     #HO DLD  cw statin         DVT ppx: xarelto  GI ppx: protonix   Code Status: full code  DISPO: acute  FULL code

## 2020-05-10 NOTE — ED ADULT NURSE NOTE - OBJECTIVE STATEMENT
pt aox4 arrives from ed from nursing home with abnormal ekg, presents tachycardic. iv placed labs sent. given iv cardizem push; hr improves with medication.  denies chest pain / discomfort. placed on continuous cardiac monitor. Will continue to monitor / assess

## 2020-05-10 NOTE — H&P ADULT - ATTENDING COMMENTS
88 YO F with a PMH of HTN, DM, DLD, Afib (xarelto), and h/o symptomatic bradycardia with AV gavin blocking agents last admission presented who was sent to the hospital by her NH for eval of hyperglycemia and tachycardia ('s). AS per pt, she denies any CP, palpitations, LE swelling. She does endorse suprapubic pain, urinary frequency, and dysuria. In the ED, pt noted to be in Afib with RVR. Given IV and PO diltiazem with IVFs (LR). Given insulin, Mg2+, and started on IV ABXs (Ceftriaxone).     Physical exam shows pt in NAD, hard of hearing. VSS, afebrile, not hypoxic on RA. A&Ox3. Non-focal neuro exam. Muscle strength/sensation intact. CTA B/L with no W/C/R. Irregular rhythm. ABD is soft and non-tender, normoactive BSs. LEs without swelling. No rashes. Desir in place with cloudy yellow urine. Labs and radiology as above. QTc 555    Atrial fibrillation with RVR, now rate controlled + UTI; Sepsis present on admission, now resolved. Tele admit. HD stable. C/w PO Diltiazem. Echo. TSH. Utox. Serial cardiac enzymes and EKGs. C/w AC, CHADsVASC (>2). IV ABXs (Ceftriaxone). FU urine culture. IVFs (LR).     AARON, likely pre-renal; Doubt ATN. Send UA, urine lytes, urine eosinophils, and trend BMP. IVFs (LR). Monitor urinary out-put. Hold nephrotoxic agents.     Prolonged QTc. Monitor Ca, K, and Mg diligently and replace/treat PRN. Serial EKGs. Hold prolonging agents.     Diabetes mellitus with hyperglycemia. A1c. FSs. Insulin PRN.     HAGMA due to lactic acidosis. IVFs (LR). Serial BMP/Lactate    Hypomagnesemia. Replace.    Hx of HTN and DLD. Restart home meds, hold nephrotoxic agents. GI and DVT PPX. Inform PCP of pt's admission to hospital. Rest as per above note.

## 2020-05-10 NOTE — ED PROVIDER NOTE - PHYSICAL EXAMINATION
CONSTITUTIONAL: Well-developed; well-nourished; in no acute distress.   SKIN: warm, dry  HEAD: Normocephalic; atraumatic.  EYES: no conjunctival injection. PERRLA. EOMI.   ENT: No nasal discharge; airway clear.  NECK: Supple; non tender.  CARD: irregularly irregular, HR 150s   RESP: No wheezes, rales or rhonchi. CTA b/l   ABD: soft ntnd.   EXT: Normal ROM. +bilateral trace pitting edema   LYMPH: No acute cervical adenopathy.  NEURO: Alert, oriented, grossly unremarkable. AAOx3  PSYCH: Cooperative, appropriate.

## 2020-05-10 NOTE — ED PROVIDER NOTE - OBJECTIVE STATEMENT
89 y/o F PMHx afib 89 y/o F PMHx afib on Xarelto, DM, HTN, symptomatic bradycardia presents to ED from [facility] with abnormal EKG and hyperglycemia in 500s. Pt also noted to be hypotensive at 87/52 by EMS. Pt currently denies any complaints. 89 y/o F PMHx afib on Xarelto, DM, HTN, symptomatic bradycardia presents to ED from [facility] with EKG changes and hyperglycemia in 500s. EKG showing afib RVR rate 150s. Pt also noted to be hypotensive at 87/52 by EMS. Pt currently denies any complaints. Pt given insulin today at nursing home.

## 2020-05-10 NOTE — ED ADULT NURSE NOTE - NSIMPLEMENTINTERV_GEN_ALL_ED
Implemented All Fall with Harm Risk Interventions:  [place] to call system. Call bell, personal items and telephone within reach. Instruct patient to call for assistance. Room bathroom lighting operational. Non-slip footwear when patient is off stretcher. Physically safe environment: no spills, clutter or unnecessary equipment. Stretcher in lowest position, wheels locked, appropriate side rails in place. Provide visual cue, wrist band, yellow gown, etc. Monitor gait and stability. Monitor for mental status changes and reorient to person, place, and time. Review medications for side effects contributing to fall risk. Reinforce activity limits and safety measures with patient and family. Provide visual clues: red socks.

## 2020-05-10 NOTE — ED PROVIDER NOTE - CARE PLAN
Principal Discharge DX:	Atrial fibrillation with RVR Principal Discharge DX:	Urinary tract infection  Secondary Diagnosis:	Atrial fibrillation with RVR  Secondary Diagnosis:	Hyperglycemia

## 2020-05-10 NOTE — ED PROVIDER NOTE - ATTENDING CONTRIBUTION TO CARE
89 year old female, pmhx as documented above, presenting from [facility] with tachycardia and hyperglycemia to 500s. Patient also was hypotensive en route by EMS to 87/52 and found to be in afib with RVR to 150s. On arrival BP improved to normotensive, but afib persisted to 150s. Patient is limited historian but denies any active complaints. Patient was given insulin today at NH for hyperglycemia prior to transfer.     Vital Signs: I have reviewed the initial vital signs.  Constitutional: NAD, well-nourished, appears stated age, no acute distress.  HEENT: Airway patent, moist MM, no erythema/swelling/deformity of oral structures. EOMI, PERRLA.  CV: (+) irregular tachycardia, well-perfused extremities, 2+ b/l DP and radial pulses equal.  Lungs: BCTA, no increased WOB.  ABD: NTND, no guarding or rebound, no pulsatile mass, no hernias.   MSK: Neck supple, nontender, nl ROM, no stepoff. Chest nontender. Back nontender in TLS spine or to b/l bony structures or flanks. Ext nontender, nl rom, no deformity.   INTEG: Skin warm, dry, no rash.  NEURO: A&Ox3, normal strength, nl sensation throughout, normal speech.   PSYCH: Calm, cooperative, normal affect and interaction.    EKG confirms afib with RVR. Patient afebrile. Given cardizem IVP with improvement of tachycardia and BP stayed stable. Will give gentle IVF, obtain labs, UA, re-eval.

## 2020-05-11 LAB
A1C WITH ESTIMATED AVERAGE GLUCOSE RESULT: 10.1 % — HIGH (ref 4–5.6)
ANION GAP SERPL CALC-SCNC: 8 MMOL/L — SIGNIFICANT CHANGE UP (ref 7–14)
BASOPHILS # BLD AUTO: 0.01 K/UL — SIGNIFICANT CHANGE UP (ref 0–0.2)
BASOPHILS NFR BLD AUTO: 0.1 % — SIGNIFICANT CHANGE UP (ref 0–1)
BLD GP AB SCN SERPL QL: SIGNIFICANT CHANGE UP
BUN SERPL-MCNC: 25 MG/DL — HIGH (ref 10–20)
CALCIUM SERPL-MCNC: 8.2 MG/DL — LOW (ref 8.5–10.1)
CHLORIDE SERPL-SCNC: 98 MMOL/L — SIGNIFICANT CHANGE UP (ref 98–110)
CK MB CFR SERPL CALC: 3.2 NG/ML — SIGNIFICANT CHANGE UP (ref 0.6–6.3)
CK SERPL-CCNC: 23 U/L — SIGNIFICANT CHANGE UP (ref 0–225)
CO2 SERPL-SCNC: 30 MMOL/L — SIGNIFICANT CHANGE UP (ref 17–32)
CREAT SERPL-MCNC: 0.9 MG/DL — SIGNIFICANT CHANGE UP (ref 0.7–1.5)
EOSINOPHIL # BLD AUTO: 0.08 K/UL — SIGNIFICANT CHANGE UP (ref 0–0.7)
EOSINOPHIL NFR BLD AUTO: 1.1 % — SIGNIFICANT CHANGE UP (ref 0–8)
ESTIMATED AVERAGE GLUCOSE: 243 MG/DL — HIGH (ref 68–114)
GLUCOSE BLDC GLUCOMTR-MCNC: 118 MG/DL — HIGH (ref 70–99)
GLUCOSE BLDC GLUCOMTR-MCNC: 149 MG/DL — HIGH (ref 70–99)
GLUCOSE BLDC GLUCOMTR-MCNC: 160 MG/DL — HIGH (ref 70–99)
GLUCOSE BLDC GLUCOMTR-MCNC: 78 MG/DL — SIGNIFICANT CHANGE UP (ref 70–99)
GLUCOSE BLDC GLUCOMTR-MCNC: 81 MG/DL — SIGNIFICANT CHANGE UP (ref 70–99)
GLUCOSE SERPL-MCNC: 93 MG/DL — SIGNIFICANT CHANGE UP (ref 70–99)
HCT VFR BLD CALC: 34.1 % — LOW (ref 37–47)
HGB BLD-MCNC: 11.6 G/DL — LOW (ref 12–16)
IMM GRANULOCYTES NFR BLD AUTO: 0.4 % — HIGH (ref 0.1–0.3)
LACTATE SERPL-SCNC: 3.1 MMOL/L — HIGH (ref 0.7–2)
LYMPHOCYTES # BLD AUTO: 0.77 K/UL — LOW (ref 1.2–3.4)
LYMPHOCYTES # BLD AUTO: 11 % — LOW (ref 20.5–51.1)
MAGNESIUM SERPL-MCNC: 2.1 MG/DL — SIGNIFICANT CHANGE UP (ref 1.8–2.4)
MCHC RBC-ENTMCNC: 33.4 PG — HIGH (ref 27–31)
MCHC RBC-ENTMCNC: 34 G/DL — SIGNIFICANT CHANGE UP (ref 32–37)
MCV RBC AUTO: 98.3 FL — SIGNIFICANT CHANGE UP (ref 81–99)
MONOCYTES # BLD AUTO: 0.62 K/UL — HIGH (ref 0.1–0.6)
MONOCYTES NFR BLD AUTO: 8.8 % — SIGNIFICANT CHANGE UP (ref 1.7–9.3)
NEUTROPHILS # BLD AUTO: 5.52 K/UL — SIGNIFICANT CHANGE UP (ref 1.4–6.5)
NEUTROPHILS NFR BLD AUTO: 78.6 % — HIGH (ref 42.2–75.2)
NRBC # BLD: 0 /100 WBCS — SIGNIFICANT CHANGE UP (ref 0–0)
PLATELET # BLD AUTO: 185 K/UL — SIGNIFICANT CHANGE UP (ref 130–400)
POTASSIUM SERPL-MCNC: 4.4 MMOL/L — SIGNIFICANT CHANGE UP (ref 3.5–5)
POTASSIUM SERPL-SCNC: 4.4 MMOL/L — SIGNIFICANT CHANGE UP (ref 3.5–5)
RBC # BLD: 3.47 M/UL — LOW (ref 4.2–5.4)
RBC # FLD: 14.6 % — HIGH (ref 11.5–14.5)
SODIUM SERPL-SCNC: 136 MMOL/L — SIGNIFICANT CHANGE UP (ref 135–146)
TROPONIN T SERPL-MCNC: <0.01 NG/ML — SIGNIFICANT CHANGE UP
WBC # BLD: 7.03 K/UL — SIGNIFICANT CHANGE UP (ref 4.8–10.8)
WBC # FLD AUTO: 7.03 K/UL — SIGNIFICANT CHANGE UP (ref 4.8–10.8)

## 2020-05-11 PROCEDURE — 76770 US EXAM ABDO BACK WALL COMP: CPT | Mod: 26

## 2020-05-11 PROCEDURE — 99497 ADVNCD CARE PLAN 30 MIN: CPT | Mod: 25

## 2020-05-11 PROCEDURE — 99223 1ST HOSP IP/OBS HIGH 75: CPT | Mod: AI

## 2020-05-11 RX ORDER — ACETAMINOPHEN 500 MG
650 TABLET ORAL EVERY 6 HOURS
Refills: 0 | Status: DISCONTINUED | OUTPATIENT
Start: 2020-05-11 | End: 2020-05-13

## 2020-05-11 RX ORDER — POTASSIUM CHLORIDE 20 MEQ
40 PACKET (EA) ORAL ONCE
Refills: 0 | Status: COMPLETED | OUTPATIENT
Start: 2020-05-11 | End: 2020-05-11

## 2020-05-11 RX ORDER — SODIUM CHLORIDE 9 MG/ML
1000 INJECTION INTRAMUSCULAR; INTRAVENOUS; SUBCUTANEOUS
Refills: 0 | Status: DISCONTINUED | OUTPATIENT
Start: 2020-05-11 | End: 2020-05-12

## 2020-05-11 RX ORDER — LACTULOSE 10 G/15ML
10 SOLUTION ORAL
Refills: 0 | Status: DISCONTINUED | OUTPATIENT
Start: 2020-05-11 | End: 2020-05-11

## 2020-05-11 RX ORDER — POTASSIUM CHLORIDE 20 MEQ
40 PACKET (EA) ORAL EVERY 4 HOURS
Refills: 0 | Status: DISCONTINUED | OUTPATIENT
Start: 2020-05-11 | End: 2020-05-11

## 2020-05-11 RX ADMIN — CEFTRIAXONE 100 MILLIGRAM(S): 500 INJECTION, POWDER, FOR SOLUTION INTRAMUSCULAR; INTRAVENOUS at 23:48

## 2020-05-11 RX ADMIN — Medication 5 UNIT(S): at 12:59

## 2020-05-11 RX ADMIN — Medication 20 MILLIGRAM(S): at 06:02

## 2020-05-11 RX ADMIN — PANTOPRAZOLE SODIUM 40 MILLIGRAM(S): 20 TABLET, DELAYED RELEASE ORAL at 06:02

## 2020-05-11 RX ADMIN — Medication 650 MILLIGRAM(S): at 08:39

## 2020-05-11 RX ADMIN — INSULIN GLARGINE 13 UNIT(S): 100 INJECTION, SOLUTION SUBCUTANEOUS at 23:48

## 2020-05-11 RX ADMIN — ATORVASTATIN CALCIUM 40 MILLIGRAM(S): 80 TABLET, FILM COATED ORAL at 23:48

## 2020-05-11 RX ADMIN — RIVAROXABAN 20 MILLIGRAM(S): KIT at 18:10

## 2020-05-11 RX ADMIN — Medication 650 MILLIGRAM(S): at 18:00

## 2020-05-11 RX ADMIN — AMIODARONE HYDROCHLORIDE 200 MILLIGRAM(S): 400 TABLET ORAL at 06:01

## 2020-05-11 RX ADMIN — Medication 40 MILLIEQUIVALENT(S): at 00:56

## 2020-05-11 RX ADMIN — SODIUM CHLORIDE 75 MILLILITER(S): 9 INJECTION INTRAMUSCULAR; INTRAVENOUS; SUBCUTANEOUS at 16:33

## 2020-05-11 RX ADMIN — TAMSULOSIN HYDROCHLORIDE 0.4 MILLIGRAM(S): 0.4 CAPSULE ORAL at 23:48

## 2020-05-11 RX ADMIN — Medication 40 MILLIEQUIVALENT(S): at 02:54

## 2020-05-11 NOTE — CHART NOTE - NSCHARTNOTEFT_GEN_A_CORE
Pt seen by nocturnist in am.   Pt seen and examined at bedside. vitals and exam stable. Chart reviewed. Discussed plan with resident.   Pt is no longer in afib with RVR. cardiac telemonitoring dced, no ischemia, likely 2/2 UTI, follow up COVID

## 2020-05-11 NOTE — CONSULT NOTE ADULT - SUBJECTIVE AND OBJECTIVE BOX
HPI:  88 y/o female w/ a pmh of HTN, DMT2, DLD, Afib on xarelto, h/o symptomatic bradycardia with AV gavin blocking agents last admission presented to the ED for EKG changes and hyperglycemia to 500s in NH.    Pt is AAO x 3 on my encounter, very hard of hearing. States she has been having "trouble urinating" x 2 weeks. Pt endorses increased frequency and suprapubic tenderness. Also endorses feeling weak and having constipation for the same duration. States thomas catheter was placed at the NH but  Pt denies having fever, dysuria, sick contact, recent travel, nausea, vomiting, chest pain, palpitations, shortness of breath, cough.  Pt states she has been compliant with her medications. Son helps her with decision making, not sure about code status as of now .  As per NH charts, pt was found to be hyperglycemic to 417, was given 10 units insulin. pt was also found to be in Afib with RVR with HR in 150s and BP 87/52 mmhg. In the ED, Pt was given Diltiazem 60 x 1 time dose followed by iv 10 mg, 2 gm Mg, LR bolus. Pt's BP and heart rate improved. Pt was also found to be hyperglycemic in 300s, beta hydroxybutyrate negative. BNP 3000s, CXR showed no congestion. COVID swab is sent (10 May 2020 21:31)      PAST MEDICAL & SURGICAL HISTORY:  Atrial fibrillation  High blood cholesterol  Hypertension  Diabetes mellitus, type 2  History of hip surgery          MEDICATIONS  (STANDING):  aMIOdarone    Tablet 200 milliGRAM(s) Oral daily  atorvastatin 40 milliGRAM(s) Oral at bedtime  cefTRIAXone   IVPB 1000 milliGRAM(s) IV Intermittent every 24 hours  chlorhexidine 4% Liquid 1 Application(s) Topical <User Schedule>  dextrose 5%. 1000 milliLiter(s) (50 mL/Hr) IV Continuous <Continuous>  dextrose 50% Injectable 12.5 Gram(s) IV Push once  dextrose 50% Injectable 25 Gram(s) IV Push once  dextrose 50% Injectable 25 Gram(s) IV Push once  insulin glargine Injectable (LANTUS) 13 Unit(s) SubCutaneous at bedtime  insulin lispro (HumaLOG) corrective regimen sliding scale   SubCutaneous three times a day before meals  insulin lispro Injectable (HumaLOG) 5 Unit(s) SubCutaneous three times a day before meals  pantoprazole    Tablet 40 milliGRAM(s) Oral before breakfast  rivaroxaban 20 milliGRAM(s) Oral with dinner  tamsulosin 0.4 milliGRAM(s) Oral at bedtime  torsemide 20 milliGRAM(s) Oral daily    MEDICATIONS  (PRN):  acetaminophen   Tablet .. 650 milliGRAM(s) Oral every 6 hours PRN Mild Pain (1 - 3)  dextrose 40% Gel 15 Gram(s) Oral once PRN Blood Glucose LESS THAN 70 milliGRAM(s)/deciliter  glucagon  Injectable 1 milliGRAM(s) IntraMuscular once PRN Glucose LESS THAN 70 milligrams/deciliter      FAMILY HISTORY:  No pertinent family history in first degree relatives      Allergies    No Known Drug Allergies  Originally Entered as [RASH] reaction to [cashew nut] (Unknown)    Intolerances        SOCIAL HISTORY:    [  ] Etoh  [  ] Smoking  [  ] Substance abuse     Home Environment:  [  ] Home Alone  [  ] Lives with Family  [  ] Home Health Aid    Dwelling:  [  ] Apartment  [  ] Private House  [  ] Adult Home  [x  ] Skilled Nursing Facility      [  ] Short Term  [  ] Long Term  [  ] Stairs       Elevator [  ]    FUNCTIONAL STATUS PTA: (Check all that apply)  Ambulation: [   ]Independent    [ x ] Dependent     [  ] Non-Ambulatory  Assistive Device: [  ] SA Cane  [  ]  Q Cane  [  ] Walker  [  ]  Wheelchair  ADL : [  ] Independent  [ x ]  Dependent       Vital Signs Last 24 Hrs  T(C): 36 (11 May 2020 07:42), Max: 37.6 (10 May 2020 19:35)  T(F): 96.8 (11 May 2020 07:42), Max: 99.6 (10 May 2020 19:35)  HR: 70 (11 May 2020 07:42) (70 - 154)  BP: 110/66 (11 May 2020 07:42) (98/58 - 127/72)  BP(mean): --  RR: 17 (11 May 2020 07:42) (17 - 20)  SpO2: 99% (11 May 2020 07:42) (97% - 100%)      PHYSICAL EXAM: ( referenced )  GEN: No acute distress  LUNGS: Clear to auscultation bilaterally   HEART: S1/S2 present. RRR.   ABD: Soft, non-tender, non-distended. Bowel sounds present  EXT: NC/NC/NE/2+PP/RODRIGUES  NEURO: AAOX3      LABS:                        11.6   7.03  )-----------( 185      ( 11 May 2020 06:03 )             34.1     05-11    136  |  98  |  25<H>  ----------------------------<  93  4.4   |  30  |  0.9    Ca    8.2<L>      11 May 2020 06:03  Phos  3.5     05-10  Mg     2.1     11    TPro  4.1<L>  /  Alb  2.2<L>  /  TBili  0.2  /  DBili  x   /  AST  22  /  ALT  19  /  AlkPhos  101  05-10    PT/INR - ( 10 May 2020 19:45 )   PT: 27.90 sec;   INR: 2.43 ratio         PTT - ( 10 May 2020 19:45 )  PTT:40.2 sec  Urinalysis Basic - ( 10 May 2020 22:11 )    Color: Light Yellow / Appearance: Clear / S.013 / pH: x  Gluc: x / Ketone: Negative  / Bili: Negative / Urobili: <2 mg/dL   Blood: x / Protein: Trace / Nitrite: Negative   Leuk Esterase: Large / RBC: 1 /HPF / WBC 40 /HPF   Sq Epi: x / Non Sq Epi: 0 /HPF / Bacteria: Many        RADIOLOGY & ADDITIONAL STUDIES:    Assesment:

## 2020-05-11 NOTE — PROGRESS NOTE ADULT - ASSESSMENT
#Sepsis likely secondary to acute cystitis  UA positive  F/u urine, blood cultures  cw rocephin for now  fu renal usg  thomas catheter for now  f/u repeat lactate    #Acute kidney injury  resolved  d/c IVF    #Afib with RVR  admit to tele  s/p po cardizem 60 and iv 10 mg  likely secondary to acute cystitis  Cardiac enzymes negative *2  Pulse O2 stable on room air  cw xarelto and amiodarone  avoid over dose of AV gavin blocking gents as pt ha sh/o symptomatic bradycardia last adm, Pt not on any AV gavin blocking agents at home. HR in 70s on monitor now, continue to monitor for now  [id number] score is 5. On Xarelto  elevated BNP, cw torsemide (home med) for now  Cardio Dr Gates, eval not indicated as of now    #Hyponatremia, resolved  on admission 131, today 136  likely secondary to hyperglycemia    #HO DM controlled   insulin regimen   FS AC+ HS    #HO HTN  amlodipine was stopped last adm due to symptomatic laura  BP stable now, when pt stable and anti HTN needed can start with ACE / ARB     #HO DLD  cw statin     #DVT ppx: xarelto  #GI ppx: protonix   #Code Status: full code  #DISPO: acute, pending UCx. From [facility], would like to look into other NH options  #FULL code #Sepsis likely secondary to acute cystitis  UA positive  F/u urine, blood cultures  cw rocephin for now  fu renal usg  thomas catheter for now  f/u repeat lactate    #Acute kidney injury  resolved  d/c IVF    #Afib with RVR  HR currently controlled  D/c telemetry  s/p po cardizem 60 and iv 10 mg  likely secondary to acute cystitis  Cardiac enzymes negative *2  Pulse O2 stable on room air  cw xarelto and amiodarone  avoid over dose of AV gavin blocking gents as pt ha sh/o symptomatic bradycardia last adm, Pt not on any AV gavin blocking agents at home. HR in 70s on monitor now, continue to monitor for now  [id number] score is 5. On Xarelto  elevated BNP, cw torsemide (home med) for now  Cardio Dr Gates, eval not indicated as of now    #Hyponatremia, resolved  on admission 131, today 136  likely secondary to hyperglycemia    #HO DM controlled   insulin regimen   FS AC+ HS    #HO HTN  amlodipine was stopped last adm due to symptomatic laura  BP stable now, when pt stable and anti HTN needed can start with ACE / ARB     #HO DLD  cw statin     #DVT ppx: xarelto  #GI ppx: protonix   #Code Status: full code  #DISPO: acute, pending UCx. From [facility], would like to look into other NH options  #FULL code  # Catrachito Montes contacted at [phone] and updated about plan of care

## 2020-05-11 NOTE — ED ADULT NURSE REASSESSMENT NOTE - NS ED NURSE REASSESS COMMENT FT1
Pt in ED is PUI. Pt received bed in f9. ED RN checked pt labs to confirm as pt covid results did not come back yet. ED Charge RN aware of situation, contacted bed management to confirm if pt is to be moved to a covid unit. As per bed management, their upper management is telling them to assign covid beds to pt who are currently PUI , and to move this pt to f9. Unwillingly, ED RN contacted f9 to give report. F9 nurse refused to take report as patient is not covid positive and is currently PUI. ED Director Aleks and ED ANM Ester made aware of situation. ED RN expressed his concerns as he is uncomfortable sending a PUI pt to a covid unit. ED Charge RN contacted bed management to further investigate situation- as per bed management send pt. ED charge RN contacted AND, AND said do not send pt. Pt is currently in ED. RN awaiting further instruction  from upper management.

## 2020-05-11 NOTE — PATIENT PROFILE ADULT - DISASTER - DO YOU WANT TO COMPLETE THE HCP AND NAME A HEALTH CARE AGENT
45 ml injected throughout the case. 55 mL total wasted during the case. 100 mL total used in the case.  no

## 2020-05-11 NOTE — CONSULT NOTE ADULT - ASSESSMENT
IMPRESSION: Rehab of debilitation / r/o covid    PRECAUTIONS: [  ] Cardiac  [ x ] Respiratory  [  ] Seizures [ x ] Contact Isolation  [x  ] Droplet Isolation  [  ] Other    Weight Bearing Status:     RECOMMENDATION:    Out of Bed to Chair     DVT/Decubiti Prophylaxis    REHAB PLAN:     [ x  ] Bedside P/T 3-5 times a week   [   ]   Bedside O/T  2-3 times a week             [   ] No Rehab Therapy Indicated                   [   ]  Speech Therapy   Conditioning/ROM                                    ADL  Bed Mobility                                               Conditioning/ROM  Transfers                                                     Bed Mobility  Sitting /Standing Balance                         Transfers                                        Gait Training                                               Sitting/Standing Balance  Stair Training [   ]Applicable                    Home equipment Eval                                                                        Splinting  [   ] Only      GOALS:   ADL   [   ]   Independent                    Transfers  [  x ] Independent                          Ambulation  [ x  ] Independent     [ x   ] With device                            [   ]  CG                                                         [   ]  CG                                                                  [   ] CG                            [    ] Min A                                                   [   ] Min A                                                              [   ] Min  A          DISCHARGE PLAN:   [   ]  Good candidate for Intensive Rehabilitation/Hospital based                                             Will tolerate 3hrs Intensive Rehab Daily                                       [   x ]  Short Term Rehab in Skilled Nursing Facility                                       [    ]  Home with Outpatient or  services                                         [    ]  Possible Candidate for Intensive Hospital based Rehab

## 2020-05-11 NOTE — PROGRESS NOTE ADULT - SUBJECTIVE AND OBJECTIVE BOX
SUBJECTIVE:    Patient is a 89y old Female who presents with a chief complaint of hyperglycemia, Afib with RVR (10 May 2020 21:31)    Currently admitted to medicine with the primary diagnosis of Atrial fibrillation with RVR     Today is hospital day 1d. This morning she is resting comfortably in bed and reports back pain, wants Tyelnol    PAST MEDICAL & SURGICAL HISTORY  Atrial fibrillation  High blood cholesterol  Hypertension  Diabetes mellitus, type 2  History of hip surgery    SOCIAL HISTORY:  Negative for smoking/alcohol/drug use.     ALLERGIES:  No Known Drug Allergies  Originally Entered as [RASH] reaction to [cashew nut] (Unknown)    MEDICATIONS:  STANDING MEDICATIONS  aMIOdarone    Tablet 200 milliGRAM(s) Oral daily  atorvastatin 40 milliGRAM(s) Oral at bedtime  cefTRIAXone   IVPB 1000 milliGRAM(s) IV Intermittent every 24 hours  chlorhexidine 4% Liquid 1 Application(s) Topical <User Schedule>  dextrose 5%. 1000 milliLiter(s) IV Continuous <Continuous>  dextrose 50% Injectable 12.5 Gram(s) IV Push once  dextrose 50% Injectable 25 Gram(s) IV Push once  dextrose 50% Injectable 25 Gram(s) IV Push once  insulin glargine Injectable (LANTUS) 13 Unit(s) SubCutaneous at bedtime  insulin lispro (HumaLOG) corrective regimen sliding scale   SubCutaneous three times a day before meals  insulin lispro Injectable (HumaLOG) 5 Unit(s) SubCutaneous three times a day before meals  pantoprazole    Tablet 40 milliGRAM(s) Oral before breakfast  rivaroxaban 20 milliGRAM(s) Oral with dinner  sodium chloride 0.9%. 1000 milliLiter(s) IV Continuous <Continuous>  tamsulosin 0.4 milliGRAM(s) Oral at bedtime  torsemide 20 milliGRAM(s) Oral daily    PRN MEDICATIONS  acetaminophen   Tablet .. 650 milliGRAM(s) Oral every 6 hours PRN  dextrose 40% Gel 15 Gram(s) Oral once PRN  glucagon  Injectable 1 milliGRAM(s) IntraMuscular once PRN    VITALS:   T(F): 96.8  HR: 70  BP: 110/66  RR: 17  SpO2: 99%    LABS:                        11.6   7.03  )-----------( 185      ( 11 May 2020 06:03 )             34.1     05-11    136  |  98  |  25<H>  ----------------------------<  93  4.4   |  30  |  0.9    Ca    8.2<L>      11 May 2020 06:03  Phos  3.5     05-10  Mg     2.1         TPro  4.1<L>  /  Alb  2.2<L>  /  TBili  0.2  /  DBili  x   /  AST  22  /  ALT  19  /  AlkPhos  101  05-10    PT/INR - ( 10 May 2020 19:45 )   PT: 27.90 sec;   INR: 2.43 ratio         PTT - ( 10 May 2020 19:45 )  PTT:40.2 sec  Urinalysis Basic - ( 10 May 2020 22:11 )    Color: Light Yellow / Appearance: Clear / S.013 / pH: x  Gluc: x / Ketone: Negative  / Bili: Negative / Urobili: <2 mg/dL   Blood: x / Protein: Trace / Nitrite: Negative   Leuk Esterase: Large / RBC: 1 /HPF / WBC 40 /HPF   Sq Epi: x / Non Sq Epi: 0 /HPF / Bacteria: Many        Creatine Kinase, Serum: 23 U/L (20 @ 06:03)  Troponin T, Serum: <0.01 ng/mL (20 @ 06:03)  Troponin T, Serum: <0.01 ng/mL (05-10-20 @ 19:45)      CARDIAC MARKERS ( 11 May 2020 06:03 )  x     / <0.01 ng/mL / 23 U/L / x     / 3.2 ng/mL  CARDIAC MARKERS ( 10 May 2020 19:45 )  x     / <0.01 ng/mL / x     / x     / x          RADIOLOGY:    PHYSICAL EXAM:  GEN: No acute distress  LUNGS: Clear to auscultation bilaterally   HEART: S1/S2 present. RRR.   ABD: Soft, non-tender, non-distended. Bowel sounds present  EXT: NC/NC/NE/2+PP/RODRIGUES  NEURO: AAOX3

## 2020-05-12 LAB
GLUCOSE BLDC GLUCOMTR-MCNC: 119 MG/DL — HIGH (ref 70–99)
GLUCOSE BLDC GLUCOMTR-MCNC: 148 MG/DL — HIGH (ref 70–99)
GLUCOSE BLDC GLUCOMTR-MCNC: 166 MG/DL — HIGH (ref 70–99)
GLUCOSE BLDC GLUCOMTR-MCNC: 212 MG/DL — HIGH (ref 70–99)
LACTATE SERPL-SCNC: 1.6 MMOL/L — SIGNIFICANT CHANGE UP (ref 0.7–2)
SARS-COV-2 RNA SPEC QL NAA+PROBE: SIGNIFICANT CHANGE UP

## 2020-05-12 PROCEDURE — 99233 SBSQ HOSP IP/OBS HIGH 50: CPT

## 2020-05-12 RX ORDER — MAGNESIUM HYDROXIDE 400 MG/1
30 TABLET, CHEWABLE ORAL EVERY 6 HOURS
Refills: 0 | Status: COMPLETED | OUTPATIENT
Start: 2020-05-12 | End: 2020-05-12

## 2020-05-12 RX ADMIN — SODIUM CHLORIDE 75 MILLILITER(S): 9 INJECTION INTRAMUSCULAR; INTRAVENOUS; SUBCUTANEOUS at 09:00

## 2020-05-12 RX ADMIN — PANTOPRAZOLE SODIUM 40 MILLIGRAM(S): 20 TABLET, DELAYED RELEASE ORAL at 09:47

## 2020-05-12 RX ADMIN — MAGNESIUM HYDROXIDE 30 MILLILITER(S): 400 TABLET, CHEWABLE ORAL at 17:17

## 2020-05-12 RX ADMIN — RIVAROXABAN 20 MILLIGRAM(S): KIT at 17:17

## 2020-05-12 RX ADMIN — CHLORHEXIDINE GLUCONATE 1 APPLICATION(S): 213 SOLUTION TOPICAL at 05:28

## 2020-05-12 RX ADMIN — INSULIN GLARGINE 13 UNIT(S): 100 INJECTION, SOLUTION SUBCUTANEOUS at 22:31

## 2020-05-12 RX ADMIN — Medication 20 MILLIGRAM(S): at 05:28

## 2020-05-12 RX ADMIN — CEFTRIAXONE 100 MILLIGRAM(S): 500 INJECTION, POWDER, FOR SOLUTION INTRAMUSCULAR; INTRAVENOUS at 22:31

## 2020-05-12 RX ADMIN — Medication 5 UNIT(S): at 17:17

## 2020-05-12 RX ADMIN — Medication 1: at 11:57

## 2020-05-12 RX ADMIN — Medication 5 UNIT(S): at 11:56

## 2020-05-12 RX ADMIN — AMIODARONE HYDROCHLORIDE 200 MILLIGRAM(S): 400 TABLET ORAL at 05:28

## 2020-05-12 NOTE — DIETITIAN INITIAL EVALUATION ADULT. - FEEDING SKILL
Spoke to pt via room phone ([id number]). Reports adequate appetite and intake at baseline but slight decrease in intake x "few days PTA" d/t acute symptoms. Pt continued to eat at meal times but portion size varied during this time. Provided diabetic diet at NH. Confirms cashew allergy. Denies use of nutrition supplements or cultural/[employer] food preferences./independent

## 2020-05-12 NOTE — PROGRESS NOTE ADULT - SUBJECTIVE AND OBJECTIVE BOX
LATRICIA SUÁREZ 89y Female  MRN#: [id number]         SUBJECTIVE  Patient is a 89y old Female who presents with a chief complaint of hyperglycemia, Afib with RVR (11 May 2020 10:20)  Currently admitted to medicine with the primary diagnosis of Atrial fibrillation with RVR    Patient doing fine. reports no symptoms        OBJECTIVE  PAST MEDICAL & SURGICAL HISTORY  Atrial fibrillation  High blood cholesterol  Hypertension  Diabetes mellitus, type 2  History of hip surgery    ALLERGIES:  No Known Drug Allergies  Originally Entered as [RASH] reaction to [cashew nut] (Unknown)    MEDICATIONS:  STANDING MEDICATIONS  aMIOdarone    Tablet 200 milliGRAM(s) Oral daily  atorvastatin 40 milliGRAM(s) Oral at bedtime  cefTRIAXone   IVPB 1000 milliGRAM(s) IV Intermittent every 24 hours  chlorhexidine 4% Liquid 1 Application(s) Topical <User Schedule>  dextrose 5%. 1000 milliLiter(s) IV Continuous <Continuous>  dextrose 50% Injectable 12.5 Gram(s) IV Push once  dextrose 50% Injectable 25 Gram(s) IV Push once  dextrose 50% Injectable 25 Gram(s) IV Push once  insulin glargine Injectable (LANTUS) 13 Unit(s) SubCutaneous at bedtime  insulin lispro (HumaLOG) corrective regimen sliding scale   SubCutaneous three times a day before meals  insulin lispro Injectable (HumaLOG) 5 Unit(s) SubCutaneous three times a day before meals  pantoprazole    Tablet 40 milliGRAM(s) Oral before breakfast  rivaroxaban 20 milliGRAM(s) Oral with dinner  tamsulosin 0.4 milliGRAM(s) Oral at bedtime  torsemide 20 milliGRAM(s) Oral daily    PRN MEDICATIONS  acetaminophen   Tablet .. 650 milliGRAM(s) Oral every 6 hours PRN  dextrose 40% Gel 15 Gram(s) Oral once PRN  glucagon  Injectable 1 milliGRAM(s) IntraMuscular once PRN      VITAL SIGNS: Last 24 Hours  T(C): 36.3 (12 May 2020 05:14), Max: 36.4 (11 May 2020 20:00)  T(F): 97.3 (12 May 2020 05:14), Max: 97.6 (11 May 2020 20:00)  HR: 82 (12 May 2020 05:14) (73 - 82)  BP: 126/61 (12 May 2020 05:14) (111/58 - 139/61)  BP(mean): --  RR: 16 (12 May 2020 05:14) (16 - 18)  SpO2: 100% (12 May 2020 07:40) (96% - 100%)    LABS:                        11.6   7.03  )-----------( 185      ( 11 May 2020 06:03 )             34.1     05-11    136  |  98  |  25<H>  ----------------------------<  93  4.4   |  30  |  0.9    Ca    8.2<L>      11 May 2020 06:03  Phos  3.5     05-10  Mg     2.1         TPro  4.1<L>  /  Alb  2.2<L>  /  TBili  0.2  /  DBili  x   /  AST  22  /  ALT  19  /  AlkPhos  101  05-10    PT/INR - ( 10 May 2020 19:45 )   PT: 27.90 sec;   INR: 2.43 ratio         PTT - ( 10 May 2020 19:45 )  PTT:40.2 sec  Urinalysis Basic - ( 10 May 2020 22:11 )    Color: Light Yellow / Appearance: Clear / S.013 / pH: x  Gluc: x / Ketone: Negative  / Bili: Negative / Urobili: <2 mg/dL   Blood: x / Protein: Trace / Nitrite: Negative   Leuk Esterase: Large / RBC: 1 /HPF / WBC 40 /HPF   Sq Epi: x / Non Sq Epi: 0 /HPF / Bacteria: Many        Lactate, Blood: 1.6 mmol/L (20 @ 07:03)      Culture - Urine (collected 10 May 2020 22:35)  Source: .Urine Catheterized  Preliminary Report (12 May 2020 08:02):    >100,000 CFU/ml Gram Negative Rods      CARDIAC MARKERS ( 11 May 2020 06:03 )  x     / <0.01 ng/mL / 23 U/L / x     / 3.2 ng/mL  CARDIAC MARKERS ( 10 May 2020 19:45 )  x     / <0.01 ng/mL / x     / x     / x          RADIOLOGY:        PHYSICAL EXAM:  GEN: No acute distress  LUNGS: Clear to auscultation bilaterally   HEART: S1/S2 present. RRR.   ABD: Soft, non-tender, non-distended. Bowel sounds present  EXT: NC/NC/NE/2+PP/RODRIGUES  NEURO: AAOX3        Assessment and Plan:   #Sepsis likely secondary to acute cystitis  UA positive  F/u urine cx --> >100,000 GNR, blood cultures  cw rocephin for now  fu renal usg-->negative  thomas catheter for now --> d/c after PT  f/u repeat lactate    #Acute kidney injury  resolved  d/c IVF    #Afib with RVR  HR currently controlled  D/c telemetry  s/p po cardizem 60 and iv 10 mg  likely secondary to acute cystitis  Cardiac enzymes negative *2  Pulse O2 stable on room air  cw xarelto and amiodarone  avoid over dose of AV gavin blocking gents as pt ha sh/o symptomatic bradycardia last adm, Pt not on any AV gavin blocking agents at home. HR in 70s on monitor now, continue to monitor for now  [id number] score is 5. On Xarelto  elevated BNP, cw torsemide (home med) for now  Cardio Dr Gates, eval not indicated as of now    #Hyponatremia, resolved  on admission 131 increased to 136  likely secondary to hyperglycemia    #HO DM controlled   insulin regimen   FS AC+ HS    #HO HTN  amlodipine was stopped last adm due to symptomatic laura  BP stable now, when pt stable and anti HTN needed can start with ACE / ARB     #HO DLD  cw statin     #DVT ppx: xarelto  #GI ppx: protonix   #Code Status: full code  #DISPO: acute, pending UCx. From [facility],   #FULL code  # Catrachito Montes contact at [phone]

## 2020-05-12 NOTE — DIETITIAN INITIAL EVALUATION ADULT. - OTHER INFO
Pt p/w hyperglycemia and Afib with RVR. Hospital course complicated by Sepsis POA likely 2/2 acute cystitis--COVID negative, f/u urine & blood Cxs. Afib with RVR, heart rate improved, off telemetry. T2DM, pseudohyponatremia 2/2 high sugars--basal/bolus insulin in place.

## 2020-05-12 NOTE — DIETITIAN INITIAL EVALUATION ADULT. - PHYSICAL APPEARANCE
BMI 24.2 (150#, 66in). denies recent wt changes, confirmed by wt hx of 150# 12/2019 admit. stage II pressure ulcers to sacrum and L buttocks. 1+ edema (generalized), 2+ edema b/l legs.

## 2020-05-12 NOTE — OCCUPATIONAL THERAPY INITIAL EVALUATION ADULT - RANGE OF MOTION EXAMINATION, UPPER EXTREMITY
Right UE Passive ROM was WFL  (within functional limits)/B shoulder flexion AROM 0-110/Left UE Passive ROM was WFL  (within functional limits)

## 2020-05-12 NOTE — PHYSICAL THERAPY INITIAL EVALUATION ADULT - CRITERIA FOR SKILLED THERAPEUTIC INTERVENTIONS
anticipated discharge recommendation/impairments found/functional limitations in following categories/predicted duration of therapy intervention/anticipated equipment needs at discharge/therapy frequency/rehab potential/risk reduction/prevention

## 2020-05-12 NOTE — DIETITIAN INITIAL EVALUATION ADULT. - ENERGY INTAKE
Pt reports appetite remains fair, consuming 50-60% meals as per staff. Pt self feeds, denies need for softer foods. Denied offer of nutrition supplement. Consider daily multivitamin considering pressure ulcers. Recommendations d/w LIP. Fair (>50%)

## 2020-05-12 NOTE — PROGRESS NOTE ADULT - SUBJECTIVE AND OBJECTIVE BOX
JOSE ARMANDOLATRICIA  89y  Female  ***My note supersedes ALL resident notes that I sign.  My corrections for their notes are in my note.***    I can be reached directly on [employer] [id number]. My office number is [phone]. My personal cell number is [phone].    INTERVAL EVENTS: Here for f/u of UTI. Pt says she has some discomfort in sacral decubitus, which she had at NH. Her UTI pain is gone. She said she walked about 1 mo ago w/ walker, but has gotten weaker recently. She says she lives at [facility] long term. She does not usually have a thomas.    T(F): 97.3 (20 @ 05:14), Max: 97.6 (20 @ 20:00)  HR: 82 (20 @ 05:14) (73 - 82)  BP: 126/61 (20 @ 05:14) (111/58 - 139/61)  RR: 16 (20 @ 05:14) (16 - 18)  SpO2: 100% (20 @ 07:40) (96% - 100%)    Gen: NAD; ox3  HEENT: PERRL, EOMI, mouth clr, nose clr  Neck: no nodes, no JVD, thyroid nl  lungs: clr  hrt: s1 s2 rrr no murmur  abd: soft, NT/ND, no HS megaly  gu: + thomas w/ crystal-clr urine, no blood  ext: no edema, no c/c  neuro: aa ox3, cn intact, can move all 4 ext (globally weak)    LABS:                        11.6    (    98.3   7.03  )-----------( ---------      185      ( 11 May 2020 06:03 )             34.1    (    14.6     WBC Count: 7.03 K/uL (20 @ 06:03)  WBC Count: 7.27 K/uL (05-10-20 @ 19:45)    Comprehensive Metabolic Panel (05.10.20 @ 19:45)    Sodium, Serum: 132 mmol/L - corrected 136+    Potassium, Serum: 3.5 mmol/L    Chloride, Serum: 93 mmol/L    Carbon Dioxide, Serum: 24 mmol/L    Anion Gap, Serum: 15 mmol/L    Blood Urea Nitrogen, Serum: 25 mg/dL    Creatinine, Serum: 1.1 mg/dL    Glucose, Serum: 388 mg/dL    Calcium, Total Serum: 7.3 mg/dL - corrected = 8.8    Protein Total, Serum: 4.1 g/dL    Albumin, Serum: 2.2 g/dL    Bilirubin Total, Serum: 0.2 mg/dL    Alkaline Phosphatase, Serum: 101 U/L    Aspartate Aminotransferase (AST/SGOT): 22 U/L    Alanine Aminotransferase (ALT/SGPT): 19 U/L    eGFR if Non : 44    PT/INR - ( 10 May 2020 19:45 )   PT: 27.90 sec;   INR: 2.43 ratio    PTT - ( 10 May 2020 19:45 )  PTT:40.2 sec    CARDIAC MARKERS ( 11 May 2020 06:03 )  x     / <0.01 ng/mL / 23 U/L / x     / 3.2 ng/mL  CARDIAC MARKERS ( 10 May 2020 19:45 )  x     / <0.01 ng/mL / x     / x     / x        Urinalysis Basic - ( 10 May 2020 22:11 )    Color: Light Yellow / Appearance: Clear / S.013 / pH: x  Gluc: x / Ketone: Negative  / Bili: Negative / Urobili: <2 mg/dL   Blood: x / Protein: Trace / Nitrite: Negative   Leuk Esterase: Large / RBC: 1 /HPF / WBC 40 /HPF   Sq Epi: x / Non Sq Epi: 0 /HPF / Bacteria: Many    CAPILLARY BLOOD GLUCOSE  POCT Blood Glucose.: 166 (20 @ 11:53)  POCT Blood Glucose.: 119 (20 @ 07:27)  POCT Blood Glucose.: 118 (20 @ 22:58)  POCT Blood Glucose.: 160 (20 @ 17:28)  POCT Blood Glucose.: 149 (20 @ 12:10)  POCT Blood Glucose.: 81 (20 @ 08:46)  POCT Blood Glucose.: 78 (20 @ 08:00)  POCT Blood Glucose.: 300 (05-10-20 @ 22:12)    Culture - Urine (collected 05-10-20 @ 22:35)  Source: .Urine Catheterized  Preliminary Report (20 @ 08:02):    >100,000 CFU/ml Gram Negative Rods    RADIOLOGY & ADDITIONAL TESTS:  < from: US Kidney and Bladder (20 @ 02:01) >  IMPRESSION:    No obstructing renal calculus or hydronephrosis.    < end of copied text >    < from: Xray Chest 1 View- PORTABLE-Urgent (05.10.20 @ 20:47) >    Impression:    No radiographic evidence of acute pulmonary disease. No significant change since the prior exam.    < end of copied text >    < from: Transthoracic Echocardiogram (19 @ 08:35) >  Summary:   1. Left ventricular ejection fraction, by visual estimation, is 60 to   65%.   2. Normal global left ventricular systolic function.   3. LV Ejection Fraction by Mistry's Method with a biplane EF of 58 %.   4. Mild left ventricular hypertrophy.   5. Mildly increased LV wall thickness.   6. Normal left ventricular internal cavity size.   7. Degenerative mitral valve.   8. Mild mitral valve regurgitation.   9. Mild to moderate mitral annular calcification.  10. Moderate thickening and calcification of the anterior and posterior   mitral valve leaflets.  11. Peak transmitral mean gradient equals 2.3 mmHg, calculated mitral   valve area by pressure half time equals 1.73 cm² consistent with mild   mitral stenosis.  12. Mild-moderate tricuspid regurgitation.  13. Mild aortic regurgitation.  14. Sclerotic aortic valve with normal opening.  15. Estimated pulmonary artery systolic pressure is 47.1 mmHg assuming a   right atrial pressure of 5 mmHg, which is consistent with mild pulmonary   hypertension.  16. LA volume Index is 31.4 ml/m² ml/m2.  17. Mitral valve mean gradient is 2.3 mmHg consistent with mild mitral   stenosis.    < end of copied text >      MEDICATIONS:  cefTRIAXone   IVPB 1000 milliGRAM(s) IV Intermittent every 24 hours    acetaminophen   Tablet .. 650 milliGRAM(s) Oral every 6 hours PRN  aMIOdarone    Tablet 200 milliGRAM(s) Oral daily  atorvastatin 40 milliGRAM(s) Oral at bedtime  chlorhexidine 4% Liquid 1 Application(s) Topical <User Schedule>  insulin glargine Injectable (LANTUS) 13 Unit(s) SubCutaneous at bedtime  insulin lispro (HumaLOG) corrective regimen sliding scale   SubCutaneous three times a day before meals  insulin lispro Injectable (HumaLOG) 5 Unit(s) SubCutaneous three times a day before meals  pantoprazole    Tablet 40 milliGRAM(s) Oral before breakfast  rivaroxaban 20 milliGRAM(s) Oral with dinner  tamsulosin 0.4 milliGRAM(s) Oral at bedtime  torsemide 20 milliGRAM(s) Oral daily

## 2020-05-12 NOTE — DIETITIAN INITIAL EVALUATION ADULT. - ENERGY NEEDS
estimated calorie needs = 1725-6342 kcal/day (MSJ x1.2-1.4 considering both pressure ulcers & adv age).  estimated protein needs = 75-88 g/day (1.1-1.3 g/kg CBW, reasons mentioned above).  estimated fluid needs = per LIP

## 2020-05-12 NOTE — DIETITIAN INITIAL EVALUATION ADULT. - PERTINENT MEDS FT
xarelto, ceftriaxone, lantus, humalog (preprandial, SSI), Mg hydroxide, amiodarone, atorvastatin, protonix, torsemide

## 2020-05-12 NOTE — OCCUPATIONAL THERAPY INITIAL EVALUATION ADULT - ADDITIONAL COMMENTS
Pt reports prior to admission required assistance for ADLS. Pt reported staying in bed secondary to increased back pain and c/o bed sores.

## 2020-05-12 NOTE — PROGRESS NOTE ADULT - ASSESSMENT
89 y/o female w/ a pmh of HTN, DMT2, DLD, Afib on xarelto, h/o symptomatic bradycardia with AV gavin blocking agents last admission presented to the ED for EKG changes and hyperglycemia to 500s in NH. Pt endorses increased frequency and suprapubic tenderness. Pt was found to be in Afib with RVR with HR in 150s and BP 87/52 mmhg. In the ED, Pt was given Diltiazem 60 x 1 time dose followed by iv 10 mg, 2 gm Mg, LR bolus. Pt's BP and heart rate improved. Lactate 3.8. Pt was also found to be hyperglycemic in 300s, beta hydroxybutyrate negative. BNP 3000s.    # Sepsis (low T, HR, RR, low BP), present on admit, likely secondary to acute cystitis to GNR; Acute kidney injury  COVID neg 5/10  fu urine culture: GNR  f/u blood culture  abx: rocephin for now  renal us: neg  d/c ivf  thomas catheter for now    # constipation - no BM x1 wk  MOM 30cc po q6 x2 doses - if no BM, try lactulose    # Afib with RVR - HR better; mild MR, MS, AR, TR; mild pulm HTN  off tele  gets laura w/ AV node blocking agents  cont amio and eliquis  tx UTI and low BP  [id number] score is 5  elevated BNP, c/w torsemide (home med)  outpt f/u cardio: Dr Gates    # pseudohyponatremia 2/2 high sugars    # DM; A1c 10.1  FS AC+ HS and keep btw 100-180 - FS better  diabetic diet  lantus 13 and lisp 5/meal    # there is NO AARON  cr is at baseline now  d/c thomas  would d/c flomax    # HTN - controlled  amlodipine was stopped last adm due to symptomatic laura  on lasix    # DLD  cw statin     # sacral decub (present on admit) at [facility]  wound care per RN protocol  turn and position    # mild macro anemia  outpt w/u    # activity: lives at NH long term; was able to walk short distances w/ asst and w/ walker at NH about 1 mo ago; has gotten weaker over 1 mo; have PT see pt  try OOB to chair to start    # DVT ppx: xarelto    # GI ppx: protonix     # Code Status: full code    DISPO: tx UTI; f/u UCx; obtain BM; tx sacrum w/ RN; PT eval  will eventually go back to NH - poss maria? (anticipate)  outpt f/u cardio  outpt w/u macro anemia 89 y/o female w/ a pmh of HTN, DMT2, DLD, Afib on xarelto, h/o symptomatic bradycardia with AV gavin blocking agents last admission presented to the ED for EKG changes and hyperglycemia to 500s in NH. Pt endorses increased frequency and suprapubic tenderness. Pt was found to be in Afib with RVR with HR in 150s and BP 87/52 mmhg. In the ED, Pt was given Diltiazem 60 x 1 time dose followed by iv 10 mg, 2 gm Mg, LR bolus. Pt's BP and heart rate improved. Lactate 3.8. Pt was also found to be hyperglycemic in 300s, beta hydroxybutyrate negative. BNP 3000s.    # Sepsis (low T, HR, RR, low BP), present on admit, likely secondary to acute cystitis to GNR; Acute kidney injury  COVID neg 5/10  fu urine culture: GNR  f/u blood culture  abx: rocephin for now  renal us: neg  d/c ivf  thomas catheter for now    # constipation - no BM x1 wk  MOM 30cc po q6 x2 doses - if no BM, try lactulose    # Afib with RVR - HR better; mild MR, MS, AR, TR; mild pulm HTN  off tele  gets laura w/ AV node blocking agents  cont amio and eliquis  tx UTI and low BP  [id number] score is 5  elevated BNP, c/w torsemide (home med)  outpt f/u cardio: Dr Gates    # pseudohyponatremia 2/2 high sugars    # DM; A1c 10.1  FS AC+ HS and keep btw 100-180 - FS better  diabetic diet  lantus 13 and lisp 5/meal    # there is NO AARON  cr is at baseline now  d/c thomas  would d/c flomax    # HTN - controlled  amlodipine was stopped last adm due to symptomatic laura  on lasix    # DLD  cw statin     # sacral decub (present on admit) at [facility]  wound care per RN protocol  turn and position    # mild macro anemia  outpt w/u    # activity: lives at NH long term; was able to walk short distances w/ asst and w/ walker at NH about 1 mo ago; has gotten weaker over 1 mo; have PT see pt  try OOB to chair to start    # DVT ppx: xarelto    # GI ppx: protonix     # Code Status: full code    # spoke w/ son, Rod, reviewed above plan and dx; I answered all questions; I told him she was COVID neg and likely going back to NH in 24-48 hrs (he agreed)    DISPO: tx UTI; f/u UCx; obtain BM; tx sacrum w/ RN; PT eval  will eventually go back to NH - poss maria? (anticipate)  outpt f/u cardio  outpt w/u macro anemia

## 2020-05-12 NOTE — DIETITIAN INITIAL EVALUATION ADULT. - FACTORS AFF FOOD INTAKE
no difficulty chewing/swallowing noted. constipation PTA improved with bowel regimen, no c/o abd discomfort or pain. BMs 5/11 & 12.

## 2020-05-12 NOTE — DIETITIAN INITIAL EVALUATION ADULT. - ADD RECOMMEND
RD will monitor diet order, energy intake, nutrition related labs, body composition, NFPF (PO tolerance, GI s/s, skin integrity).

## 2020-05-12 NOTE — DIETITIAN INITIAL EVALUATION ADULT. - CONTINUE CURRENT NUTRITION CARE PLAN
1. If PO intake <50%, add Glucerna BID. 2. Consider d/c DASH/TLC diet order to optimize intake. Continue CHO Consistent diet. 3. Consider daily multivitamin to promote wound healing.

## 2020-05-13 VITALS
RESPIRATION RATE: 20 BRPM | HEART RATE: 83 BPM | SYSTOLIC BLOOD PRESSURE: 115 MMHG | DIASTOLIC BLOOD PRESSURE: 54 MMHG | TEMPERATURE: 97 F

## 2020-05-13 LAB
-  AMIKACIN: SIGNIFICANT CHANGE UP
-  AMPICILLIN/SULBACTAM: SIGNIFICANT CHANGE UP
-  AMPICILLIN: SIGNIFICANT CHANGE UP
-  AZTREONAM: SIGNIFICANT CHANGE UP
-  CEFAZOLIN: SIGNIFICANT CHANGE UP
-  CEFEPIME: SIGNIFICANT CHANGE UP
-  CEFOXITIN: SIGNIFICANT CHANGE UP
-  CEFTRIAXONE: SIGNIFICANT CHANGE UP
-  CIPROFLOXACIN: SIGNIFICANT CHANGE UP
-  GENTAMICIN: SIGNIFICANT CHANGE UP
-  IMIPENEM: SIGNIFICANT CHANGE UP
-  LEVOFLOXACIN: SIGNIFICANT CHANGE UP
-  MEROPENEM: SIGNIFICANT CHANGE UP
-  NITROFURANTOIN: SIGNIFICANT CHANGE UP
-  PIPERACILLIN/TAZOBACTAM: SIGNIFICANT CHANGE UP
-  TIGECYCLINE: SIGNIFICANT CHANGE UP
-  TOBRAMYCIN: SIGNIFICANT CHANGE UP
-  TRIMETHOPRIM/SULFAMETHOXAZOLE: SIGNIFICANT CHANGE UP
ALBUMIN SERPL ELPH-MCNC: 2.2 G/DL — LOW (ref 3.5–5.2)
ALP SERPL-CCNC: 77 U/L — SIGNIFICANT CHANGE UP (ref 30–115)
ALT FLD-CCNC: 19 U/L — SIGNIFICANT CHANGE UP (ref 0–41)
ANION GAP SERPL CALC-SCNC: 10 MMOL/L — SIGNIFICANT CHANGE UP (ref 7–14)
AST SERPL-CCNC: 23 U/L — SIGNIFICANT CHANGE UP (ref 0–41)
BILIRUB SERPL-MCNC: 0.2 MG/DL — SIGNIFICANT CHANGE UP (ref 0.2–1.2)
BUN SERPL-MCNC: 18 MG/DL — SIGNIFICANT CHANGE UP (ref 10–20)
CALCIUM SERPL-MCNC: 8.4 MG/DL — LOW (ref 8.5–10.1)
CHLORIDE SERPL-SCNC: 99 MMOL/L — SIGNIFICANT CHANGE UP (ref 98–110)
CO2 SERPL-SCNC: 30 MMOL/L — SIGNIFICANT CHANGE UP (ref 17–32)
CREAT SERPL-MCNC: 0.8 MG/DL — SIGNIFICANT CHANGE UP (ref 0.7–1.5)
CULTURE RESULTS: SIGNIFICANT CHANGE UP
GLUCOSE BLDC GLUCOMTR-MCNC: 171 MG/DL — HIGH (ref 70–99)
GLUCOSE BLDC GLUCOMTR-MCNC: 78 MG/DL — SIGNIFICANT CHANGE UP (ref 70–99)
GLUCOSE BLDC GLUCOMTR-MCNC: 84 MG/DL — SIGNIFICANT CHANGE UP (ref 70–99)
GLUCOSE SERPL-MCNC: 83 MG/DL — SIGNIFICANT CHANGE UP (ref 70–99)
HCT VFR BLD CALC: 31.3 % — LOW (ref 37–47)
HGB BLD-MCNC: 10.4 G/DL — LOW (ref 12–16)
MAGNESIUM SERPL-MCNC: 2.2 MG/DL — SIGNIFICANT CHANGE UP (ref 1.8–2.4)
MCHC RBC-ENTMCNC: 32.3 PG — HIGH (ref 27–31)
MCHC RBC-ENTMCNC: 33.2 G/DL — SIGNIFICANT CHANGE UP (ref 32–37)
MCV RBC AUTO: 97.2 FL — SIGNIFICANT CHANGE UP (ref 81–99)
METHOD TYPE: SIGNIFICANT CHANGE UP
NRBC # BLD: 0 /100 WBCS — SIGNIFICANT CHANGE UP (ref 0–0)
ORGANISM # SPEC MICROSCOPIC CNT: SIGNIFICANT CHANGE UP
ORGANISM # SPEC MICROSCOPIC CNT: SIGNIFICANT CHANGE UP
PLATELET # BLD AUTO: 174 K/UL — SIGNIFICANT CHANGE UP (ref 130–400)
POTASSIUM SERPL-MCNC: 4.8 MMOL/L — SIGNIFICANT CHANGE UP (ref 3.5–5)
POTASSIUM SERPL-SCNC: 4.8 MMOL/L — SIGNIFICANT CHANGE UP (ref 3.5–5)
PROT SERPL-MCNC: 4.1 G/DL — LOW (ref 6–8)
RBC # BLD: 3.22 M/UL — LOW (ref 4.2–5.4)
RBC # FLD: 14.6 % — HIGH (ref 11.5–14.5)
SARS-COV-2 RNA SPEC QL NAA+PROBE: SIGNIFICANT CHANGE UP
SODIUM SERPL-SCNC: 139 MMOL/L — SIGNIFICANT CHANGE UP (ref 135–146)
SPECIMEN SOURCE: SIGNIFICANT CHANGE UP
WBC # BLD: 6.21 K/UL — SIGNIFICANT CHANGE UP (ref 4.8–10.8)
WBC # FLD AUTO: 6.21 K/UL — SIGNIFICANT CHANGE UP (ref 4.8–10.8)

## 2020-05-13 PROCEDURE — 99239 HOSP IP/OBS DSCHRG MGMT >30: CPT

## 2020-05-13 RX ORDER — CEFPODOXIME PROXETIL 100 MG
1 TABLET ORAL
Qty: 14 | Refills: 0
Start: 2020-05-13 | End: 2020-05-19

## 2020-05-13 RX ADMIN — ATORVASTATIN CALCIUM 40 MILLIGRAM(S): 80 TABLET, FILM COATED ORAL at 00:12

## 2020-05-13 RX ADMIN — AMIODARONE HYDROCHLORIDE 200 MILLIGRAM(S): 400 TABLET ORAL at 06:02

## 2020-05-13 RX ADMIN — CHLORHEXIDINE GLUCONATE 1 APPLICATION(S): 213 SOLUTION TOPICAL at 06:03

## 2020-05-13 RX ADMIN — RIVAROXABAN 20 MILLIGRAM(S): KIT at 16:22

## 2020-05-13 RX ADMIN — Medication 1: at 12:15

## 2020-05-13 RX ADMIN — Medication 5 UNIT(S): at 12:15

## 2020-05-13 RX ADMIN — Medication 20 MILLIGRAM(S): at 06:02

## 2020-05-13 RX ADMIN — PANTOPRAZOLE SODIUM 40 MILLIGRAM(S): 20 TABLET, DELAYED RELEASE ORAL at 08:25

## 2020-05-13 NOTE — DISCHARGE NOTE NURSING/CASE MANAGEMENT/SOCIAL WORK - PATIENT PORTAL LINK FT
You can access the FollowMyHealth Patient Portal offered by [facility] by registering at the following website: [email or web address]. By joining [facility]’s FollowMyHealth portal, you will also be able to view your health information using other applications (apps) compatible with our system.

## 2020-05-13 NOTE — PROGRESS NOTE ADULT - REASON FOR ADMISSION
hyperglycemia, Afib with RVR

## 2020-05-13 NOTE — DISCHARGE NOTE PROVIDER - CARE PROVIDER_API CALL
Yandel Rubio  Internal Medicine  [address], [address]  [place] [address]  Phone: [phone]  Fax: [phone]  Follow Up Time: 1 week    Bharath Gates  Diagnostic Radiology  [address] [place] [id number]  Phone: [phone]  Fax: [phone]  Follow Up Time: 2 weeks

## 2020-05-13 NOTE — PROGRESS NOTE ADULT - SUBJECTIVE AND OBJECTIVE BOX
JOSE ARMANDOLATRICIA  89y  Female  ***My note supersedes ALL resident notes that I sign.  My corrections for their notes are in my note.***    I can be reached directly on [employer]. My office number is [phone]. My personal cell number is [phone].    INTERVAL EVENTS: Here for f/u of UTI. Pt doing OK. No complaints. Is awake and alert. She remains weak in bed. She failed TOV, so thomas was placed again.    T(F): 97.1 (05-13-20 @ 13:20), Max: 97.2 (05-12-20 @ 20:20)  HR: 83 (05-13-20 @ 13:20) (73 - 83)  BP: 115/54 (05-13-20 @ 13:20) (110/57 - 120/61)  RR: 20 (05-13-20 @ 13:20) (18 - 20)  SpO2: 98% (05-12-20 @ 20:20) (98% - 98%)    05-12-20 @ 07:01  -  05-13-20 @ 07:00  --------------------------------------------------------  IN: 0 mL / OUT: 2550 mL / NET: -2550 mL    Gen: NAD; ox3  HEENT: PERRL, EOMI, mouth clr, nose clr  Neck: no nodes, no JVD, thyroid nl  lungs: clr  hrt: s1 s2 rrr no murmur  abd: soft, NT/ND, no HS megaly  gu: + thomas w/ crystal-clr urine, no blood  ext: no edema, no c/c  neuro: aa ox3, cn intact, can move all 4 ext (globally weak)    LABS:                        10.4    (    97.2   6.21  )-----------( ---------      174      ( 13 May 2020 06:44 )             31.3    (    14.6     139   (   99   (   83      05-13-20 @ 06:44  ----------------------               4.8   (   30   (   18                             -----                        0.8  Ca  8.4   Mg  2.2    P   --     LFT  4.1  (  0.2  (  23       05-13-20 @ 06:44  -------------------------  2.2  (  77  (  19    CAPILLARY BLOOD GLUCOSE  POCT Blood Glucose.: 171 (05-13-20 @ 11:58)  POCT Blood Glucose.: 78 (05-13-20 @ 07:31)  POCT Blood Glucose.: 212 (05-12-20 @ 20:42)  POCT Blood Glucose.: 148 (05-12-20 @ 16:55)  POCT Blood Glucose.: 166 (05-12-20 @ 11:53)  POCT Blood Glucose.: 119 (05-12-20 @ 07:27)  POCT Blood Glucose.: 118 (05-11-20 @ 22:58)  POCT Blood Glucose.: 160 (05-11-20 @ 17:28)    Culture - Blood (collected 05-10-20 @ 23:30)  Source: .Blood Blood-Peripheral  Preliminary Report (05-12-20 @ 12:01):    No growth to date.    Culture - Urine (collected 05-10-20 @ 22:35)  Source: .Urine Catheterized  Final Report (05-13-20 @ 10:44):    >100,000 CFU/ml Klebsiella oxytoca  Organism: Klebsiella oxytoca (05-13-20 @ 10:44)  Organism: Klebsiella oxytoca (05-13-20 @ 10:44)      -  Amikacin: S <=16      -  Ampicillin: S <=8 These ampicillin results predict results for amoxicillin      -  Ampicillin/Sulbactam: S <=8/4 Enterobacter, Citrobacter, and Serratia may develop resistance during prolonged therapy (3-4 days)      -  Aztreonam: S <=4      -  Cefazolin: S <=8      -  Cefepime: S <=4      -  Cefoxitin: S <=8      -  Ceftriaxone: S <=1 Enterobacter, Citrobacter, and Serratia may develop resistance during prolonged therapy      -  Ciprofloxacin: S <=1      -  Gentamicin: R >8      -  Imipenem: S <=1      -  Levofloxacin: S <=2      -  Meropenem: S <=1      -  Nitrofurantoin: S <=32 Should not be used to treat pyelonephritis      -  Piperacillin/Tazobactam: S <=16      -  Tigecycline: S <=2      -  Tobramycin: S <=4      -  Trimethoprim/Sulfamethoxazole: S <=2/38      Method Type: REY    RADIOLOGY & ADDITIONAL TESTS:      MEDICATIONS:  cefTRIAXone   IVPB 1000 milliGRAM(s) IV Intermittent every 24 hours    acetaminophen   Tablet .. 650 milliGRAM(s) Oral every 6 hours PRN  aMIOdarone    Tablet 200 milliGRAM(s) Oral daily  atorvastatin 40 milliGRAM(s) Oral at bedtime  chlorhexidine 4% Liquid 1 Application(s) Topical <User Schedule>  insulin glargine Injectable (LANTUS) 13 Unit(s) SubCutaneous at bedtime  insulin lispro (HumaLOG) corrective regimen sliding scale   SubCutaneous three times a day before meals  insulin lispro Injectable (HumaLOG) 5 Unit(s) SubCutaneous three times a day before meals  pantoprazole    Tablet 40 milliGRAM(s) Oral before breakfast  rivaroxaban 20 milliGRAM(s) Oral with dinner  torsemide 20 milliGRAM(s) Oral daily

## 2020-05-13 NOTE — DISCHARGE NOTE PROVIDER - PROVIDER TOKENS
PROVIDER:[TOKEN:[[id number]],FOLLOWUP:[1 week]],PROVIDER:[TOKEN:[[id number]],FOLLOWUP:[2 weeks]]

## 2020-05-13 NOTE — DISCHARGE NOTE PROVIDER - NSDCMRMEDTOKEN_GEN_ALL_CORE_FT
amiodarone 200 mg oral tablet: 1 tab(s) orally once a day  docusate sodium 100 mg oral capsule: 1 cap(s) orally 3 times a day  ferrous sulfate 325 mg (65 mg elemental iron) oral tablet: 1 tab(s) orally once a day  insulin lispro 100 units/mL injectable solution (obsolete): sliding scale only  pravastatin 80 mg oral tablet: 1 tab(s) orally once a day  Protonix 40 mg oral delayed release tablet: 1 tab(s) orally once a day  repaglinide 1 mg oral tablet: 1 tab(s) orally 2 times a day  rivaroxaban 20 mg oral tablet: 1 tab(s) orally every 24 hours  tamsulosin 0.4 mg oral capsule: 1 cap(s) orally once a day  torsemide 20 mg oral tablet: 1 tab(s) orally once a day

## 2020-05-13 NOTE — DISCHARGE NOTE PROVIDER - HOSPITAL COURSE
88 y/o female w/ a pmh of HTN, DMT2, DLD, Afib on xarelto, h/o symptomatic bradycardia with AV gavin blocking agents last admission presented to the ED for EKG changes and hyperglycemia to 500s in NH.    Pt is AAO x 3 on my encounter, very hard of hearing. States she has been having "trouble urinating" x 2 weeks. Pt endorses increased frequency and suprapubic tenderness. Also endorses feeling weak and having constipation for the same duration. States thomas catheter was placed at the NH but  Pt denies having fever, dysuria, sick contact, recent travel, nausea, vomiting, chest pain, palpitations, shortness of breath, cough.    Pt states she has been compliant with her medications. Son helps her with decision making, not sure about code status as of now .    As per NH charts, pt was found to be hyperglycemic to 417, was given 10 units insulin. pt was also found to be in Afib with RVR with HR in 150s and BP 87/52 mmhg. In the ED, Pt was given Diltiazem 60 x 1 time dose followed by iv 10 mg, 2 gm Mg, LR bolus. Pt's BP and heart rate improved. Pt was also found to be hyperglycemic in 300s, beta hydroxybutyrate negative. BNP 3000s, CXR showed no congestion. COVID swab is sent and is negative        Hospital course significant for the following events        #Sepsis likely secondary to acute cystitis    UA positive    F/u urine cx --> >100,000 GNR, --> Klebsiella oxytoca sensitive to all    - blood culture negative    took rocephin for 3 days. to be discharged on Vantin 200 bid for 7 more days    fu renal usg-->negative    thomas catheter for now --> d/c after PT --> failed TOV --> discharge on thomas catheter to [facility]            #Acute kidney injury    resolved    d/c IVF        #Afib with RVR    HR currently controlled    D/c telemetry    s/p po cardizem 60 and iv 10 mg    likely secondary to acute cystitis    Cardiac enzymes negative *2    Pulse O2 stable on room air    cw xarelto and amiodarone    avoid over dose of AV gavin blocking gents as pt ha sh/o symptomatic bradycardia last adm, Pt not on any AV gavin blocking agents at home. HR in 70s on monitor now, continue to monitor for now    [id number] score is 5. On Xarelto    elevated BNP, cw torsemide (home med)    Cardio dillon Flood not indicated as of now        #Hyponatremia, resolved    on admission 131 increased to 136    likely secondary to hyperglycemia        #HO DM controlled     continue home regimen        #HO HTN    amlodipine was stopped last adm due to symptomatic laura    BP stable now, no need for bp meds upon d/c        #HO DLD    cw statin

## 2020-05-13 NOTE — DISCHARGE NOTE PROVIDER - NSDCCPCAREPLAN_GEN_ALL_CORE_FT
PRINCIPAL DISCHARGE DIAGNOSIS  Diagnosis: UTI due to Klebsiella species  Assessment and Plan of Treatment:   You were noted either on arrival or during this hospitalization to have a Urinary Tract Infection. You may have already been treated and completed the antibiotics, please refer to the list of medications present on your discharge paperwork. If you notice that there are antibiotics listed, these may be to treat your infection, be sure to complete taking the full course, whether you have symptoms or not, as prescribed.  While taking antibiotics, you may benefit from taking a probiotic such as florastore to help to try and prevent an infectious type of diarrhea known as C Diff. If you notice that you begin having severe watery diarrhea, more than 4-5 episodes a day, please see your Primary Care Doctor or come to the ER to have your stool tested for this infection.   It is not necessary to repeat a urine test to see if the infection is gone, it is assumed that after treatment it should have resolved. However, if you continue to have symptoms, please see your Primary Care doctor or return to the ER.        SECONDARY DISCHARGE DIAGNOSES  Diagnosis: Atrial fibrillation  Assessment and Plan of Treatment: Atrial Fibrillation  Atrial fibrillation is a type of irregular heartbeat (arrhythmia) where the heart quivers continuously in a chaotic pattern that makes the heart unable to pump blood normally. This can increase the risk for stroke, heart failure, and other heart-related conditions. Atrial fibrillation can be caused by a variety of conditions and may be temporary, intermittent, or permanent. Symptoms include feeling that your heart is beating rapidly or irregularly, chest discomfort, shortness of breath, or dizziness/lightheadedness that may be worse with exertion. Treatment is varied but may involve medication or electrical shock (cardioversion).  SEEK IMMEDIATE MEDICAL CARE IF YOU HAVE ANY OF THE FOLLOWING SYMPTOMS: chest pain, shortness of breath, abdominal pain, sweating, vomiting, blood in vomit/bowel movements/urine, dizziness/lightheadedness, weakness or numbness to face/arm/leg, trouble speaking or understanding, facial droop.

## 2020-05-13 NOTE — PROGRESS NOTE ADULT - SUBJECTIVE AND OBJECTIVE BOX
LATRICIA SUÁREZ 89y Female  MRN#: [id number]       SUBJECTIVE  Patient is a 89y old Female who presents with a chief complaint of hyperglycemia, Afib with RVR (12 May 2020 11:34)  Currently admitted to medicine with the primary diagnosis of Atrial fibrillation with RVR    Patient doing fine. no complaints. failed TOV          OBJECTIVE  PAST MEDICAL & SURGICAL HISTORY  Atrial fibrillation  High blood cholesterol  Hypertension  Diabetes mellitus, type 2  History of hip surgery    ALLERGIES:  No Known Drug Allergies  Originally Entered as [RASH] reaction to [cashew nut] (Unknown)    MEDICATIONS:  STANDING MEDICATIONS  aMIOdarone    Tablet 200 milliGRAM(s) Oral daily  atorvastatin 40 milliGRAM(s) Oral at bedtime  cefTRIAXone   IVPB 1000 milliGRAM(s) IV Intermittent every 24 hours  chlorhexidine 4% Liquid 1 Application(s) Topical <User Schedule>  dextrose 5%. 1000 milliLiter(s) IV Continuous <Continuous>  dextrose 50% Injectable 12.5 Gram(s) IV Push once  dextrose 50% Injectable 25 Gram(s) IV Push once  dextrose 50% Injectable 25 Gram(s) IV Push once  insulin glargine Injectable (LANTUS) 13 Unit(s) SubCutaneous at bedtime  insulin lispro (HumaLOG) corrective regimen sliding scale   SubCutaneous three times a day before meals  insulin lispro Injectable (HumaLOG) 5 Unit(s) SubCutaneous three times a day before meals  pantoprazole    Tablet 40 milliGRAM(s) Oral before breakfast  rivaroxaban 20 milliGRAM(s) Oral with dinner  torsemide 20 milliGRAM(s) Oral daily    PRN MEDICATIONS  acetaminophen   Tablet .. 650 milliGRAM(s) Oral every 6 hours PRN  dextrose 40% Gel 15 Gram(s) Oral once PRN  glucagon  Injectable 1 milliGRAM(s) IntraMuscular once PRN      VITAL SIGNS: Last 24 Hours  T(C): 36.2 (13 May 2020 05:58), Max: 36.2 (12 May 2020 20:20)  T(F): 97.1 (13 May 2020 05:58), Max: 97.2 (12 May 2020 20:20)  HR: 73 (13 May 2020 05:42) (73 - 80)  BP: 110/57 (13 May 2020 05:42) (110/57 - 120/61)  BP(mean): --  RR: 20 (13 May 2020 05:42) (18 - 20)  SpO2: 98% (12 May 2020 20:20) (98% - 98%)    LABS:                        10.4   6.21  )-----------( 174      ( 13 May 2020 06:44 )             31.3     05-13    139  |  99  |  18  ----------------------------<  83  4.8   |  30  |  0.8    Ca    8.4<L>      13 May 2020 06:44  Mg     2.2     05-13    TPro  4.1<L>  /  Alb  2.2<L>  /  TBili  0.2  /  DBili  x   /  AST  23  /  ALT  19  /  AlkPhos  77  05-13              Culture - Blood (collected 10 May 2020 23:30)  Source: .Blood Blood-Peripheral  Preliminary Report (12 May 2020 12:01):    No growth to date.    Culture - Urine (collected 10 May 2020 22:35)  Source: .Urine Catheterized  Preliminary Report (12 May 2020 08:02):    >100,000 CFU/ml Gram Negative Rods          RADIOLOGY:        PHYSICAL EXAM:  GEN: No acute distress  LUNGS: Clear to auscultation bilaterally   HEART: S1/S2 present. RRR.   ABD: Soft, non-tender, non-distended. Bowel sounds present  EXT: NC/NC/NE/2+PP/RODRIGUES  NEURO: AAOX3        Assessment and Plan:   #Sepsis likely secondary to acute cystitis  UA positive  F/u urine cx --> >100,000 GNR, blood cultures  cw rocephin for now D3  fu renal usg-->negative  thomas catheter for now --> d/c after PT --> failed TOV  f/u repeat lactate    #Acute kidney injury  resolved  d/c IVF    #Afib with RVR  HR currently controlled  D/c telemetry  s/p po cardizem 60 and iv 10 mg  likely secondary to acute cystitis  Cardiac enzymes negative *2  Pulse O2 stable on room air  cw xarelto and amiodarone  avoid over dose of AV gavin blocking gents as pt ha sh/o symptomatic bradycardia last adm, Pt not on any AV gavin blocking agents at home. HR in 70s on monitor now, continue to monitor for now  [id number] score is 5. On Xarelto  elevated BNP, cw torsemide (home med) for now  Cardio Dr Gates, eval not indicated as of now    #Hyponatremia, resolved  on admission 131 increased to 136  likely secondary to hyperglycemia    #HO DM controlled   insulin regimen   FS AC+ HS    #HO HTN  amlodipine was stopped last adm due to symptomatic laura  BP stable now, when pt stable and anti HTN needed can start with ACE / ARB     #HO DLD  cw statin     #DVT ppx: xarelto  #GI ppx: protonix   #Code Status: full code  #DISPO: acute, pending UCx. From [facility],   #FULL code  # Catrachito Montes contact at [phone]

## 2020-05-13 NOTE — PROGRESS NOTE ADULT - ASSESSMENT
89 y/o female w/ a pmh of HTN, DMT2, DLD, Afib on xarelto, h/o symptomatic bradycardia with AV gavin blocking agents last admission presented to the ED for EKG changes and hyperglycemia to 500s in NH. Pt endorses increased frequency and suprapubic tenderness. Pt was found to be in Afib with RVR with HR in 150s and BP 87/52 mmhg. In the ED, Pt was given Diltiazem 60 x 1 time dose followed by iv 10 mg, 2 gm Mg, LR bolus. Pt's BP and heart rate improved. Lactate 3.8. Pt was also found to be hyperglycemic in 300s, beta hydroxybutyrate negative. BNP 3000s.    # Sepsis (low T, HR, RR, low BP), present on admit, likely secondary to acute cystitis to GNR  COVID neg 5/10  urine culture: pan-suscept Kleb Oxy   blood culture neg  abx: change rocephin to vantin 200mg po q12 for 1 more week then stop  renal us: neg  cont thomas catheter upon d/c to SNF    # constipation - had BM per RN (resolved)  can use MOM prn at NH    # Afib with RVR - HR better; mild MR, MS, AR, TR; mild pulm HTN  off tele  gets laura w/ AV node blocking agents  cont amio and eliquis  tx UTI and low BP  [id number] score is 5  elevated BNP, c/w torsemide (home med)  outpt f/u cardio: Dr Gates    # DM; A1c 10.1  FS AC+ HS and keep btw 100-180 - FS better  diabetic diet  lantus 13 and lisp 5/meal - can use this at NH (and then d/c repaglinide)     # there is NO AARON  cr is at baseline  d/c thomas  d/c flomax    # HTN - controlled  amlodipine was stopped last adm due to symptomatic laura  on lasix    # DLD  c/w statin     # sacral decub (present on admit) at [facility]  wound care per RN protocol - this should cont at NH  turn and position    # mild macro anemia  outpt w/u    # activity: lives at NH long term; was able to walk short distances w/ asst and w/ walker at NH about 1 mo ago; has gotten weaker over 1 mo; PT to see pt  try OOB to chair to start    # DVT ppx: xarelto    # GI ppx: protonix     # Code Status: full code    DISPO: tx UTI w/ vantin; tx sacrum w/ local care; PT eval  can go back to NH today (stable for d/c) -> f/u CM  outpt f/u cardio  outpt w/u macro anemia 89 y/o female w/ a pmh of HTN, DMT2, DLD, Afib on xarelto, h/o symptomatic bradycardia with AV gavin blocking agents last admission presented to the ED for EKG changes and hyperglycemia to 500s in NH. Pt endorses increased frequency and suprapubic tenderness. Pt was found to be in Afib with RVR with HR in 150s and BP 87/52 mmhg. In the ED, Pt was given Diltiazem 60 x 1 time dose followed by iv 10 mg, 2 gm Mg, LR bolus. Pt's BP and heart rate improved. Lactate 3.8. Pt was also found to be hyperglycemic in 300s, beta hydroxybutyrate negative. BNP 3000s.    # Sepsis (low T, HR, RR, low BP), present on admit, likely secondary to acute cystitis to GNR  COVID neg 5/10 and again 5/13  urine culture: pan-suscept Kleb Oxy   blood culture neg  abx: change rocephin to vantin 200mg po q12 for 1 more week then stop  renal us: neg  cont thomas catheter upon d/c to SNF    # constipation - had BM per RN (resolved)  can use MOM prn at NH    # Afib with RVR - HR better; mild MR, MS, AR, TR; mild pulm HTN  off tele  gets laura w/ AV node blocking agents  cont amio and eliquis  tx UTI and low BP  [id number] score is 5  elevated BNP, c/w torsemide (home med)  outpt f/u cardio: Dr Gates    # DM; A1c 10.1  FS AC+ HS and keep btw 100-180 - FS better  diabetic diet  lantus 13 and lisp 5/meal - can use this at NH (and then d/c repaglinide)     # there is NO AARON  cr is at baseline  d/c thomas  d/c flomax    # HTN - controlled  amlodipine was stopped last adm due to symptomatic laura  on lasix    # DLD  c/w statin     # sacral decub (present on admit) at [facility]  wound care per RN protocol - this should cont at NH  turn and position    # mild macro anemia  outpt w/u    # activity: lives at NH long term; was able to walk short distances w/ asst and w/ walker at NH about 1 mo ago; has gotten weaker over 1 mo; PT to see pt  try OOB to chair to start    # DVT ppx: xarelto    # GI ppx: protonix     # Code Status: full code    DISPO: tx UTI w/ vantin; tx sacrum w/ local care; PT eval  can go back to NH today (stable for d/c and COVID neg today) -> f/u CM  outpt f/u cardio  outpt w/u macro anemia

## 2020-05-15 DIAGNOSIS — B96.1 KLEBSIELLA PNEUMONIAE [K. PNEUMONIAE] AS THE CAUSE OF DISEASES CLASSIFIED ELSEWHERE: ICD-10-CM

## 2020-05-15 DIAGNOSIS — K59.00 CONSTIPATION, UNSPECIFIED: ICD-10-CM

## 2020-05-15 DIAGNOSIS — N30.00 ACUTE CYSTITIS WITHOUT HEMATURIA: ICD-10-CM

## 2020-05-15 DIAGNOSIS — Z79.4 LONG TERM (CURRENT) USE OF INSULIN: ICD-10-CM

## 2020-05-15 DIAGNOSIS — E11.65 TYPE 2 DIABETES MELLITUS WITH HYPERGLYCEMIA: ICD-10-CM

## 2020-05-15 DIAGNOSIS — E78.00 PURE HYPERCHOLESTEROLEMIA, UNSPECIFIED: ICD-10-CM

## 2020-05-15 DIAGNOSIS — I48.91 UNSPECIFIED ATRIAL FIBRILLATION: ICD-10-CM

## 2020-05-15 DIAGNOSIS — Z79.01 LONG TERM (CURRENT) USE OF ANTICOAGULANTS: ICD-10-CM

## 2020-05-15 DIAGNOSIS — E87.1 HYPO-OSMOLALITY AND HYPONATREMIA: ICD-10-CM

## 2020-05-15 DIAGNOSIS — E87.2 ACIDOSIS: ICD-10-CM

## 2020-05-15 DIAGNOSIS — E83.42 HYPOMAGNESEMIA: ICD-10-CM

## 2020-05-15 DIAGNOSIS — A41.9 SEPSIS, UNSPECIFIED ORGANISM: ICD-10-CM

## 2020-05-15 DIAGNOSIS — Z11.59 ENCOUNTER FOR SCREENING FOR OTHER VIRAL DISEASES: ICD-10-CM

## 2020-05-15 DIAGNOSIS — R35.0 FREQUENCY OF MICTURITION: ICD-10-CM

## 2020-05-15 DIAGNOSIS — L89.152 PRESSURE ULCER OF SACRAL REGION, STAGE 2: ICD-10-CM

## 2020-05-15 DIAGNOSIS — N17.9 ACUTE KIDNEY FAILURE, UNSPECIFIED: ICD-10-CM

## 2020-05-16 LAB
CULTURE RESULTS: SIGNIFICANT CHANGE UP
SPECIMEN SOURCE: SIGNIFICANT CHANGE UP

## 2020-07-21 NOTE — PATIENT PROFILE ADULT - NSPROHMDIABETMGMTSTRAT_GEN_A_NUR
Ok symbicort      Prior Fátima Duenas has been denied. \"Preferred\" medications per insurance:  Symbicort HFA Aerosol inhaler    Fluticasone propionate-salmeterol inhalation powder blister    Please advise which you would like to order. blood glucose testing

## 2021-02-10 NOTE — DISCHARGE NOTE ADULT - NS AS DC FU INST LIST INST
Physical Therapy Daily Progress Note Entered On:  11/2/2020 11:56     Performed On:  11/2/2020 9:30  by Jorje Verna               Musculoskeletal Assessment   Comments and Special Tests :   Pt's vitals were stable throughout therapy session.   Muscle Tone :   Normal   Verna Recinos - 11/2/2020 11:41    Wheelchair Mobility   Rolling to Left :   N/A   Rolling to Right :   N/A   Supine to Sitting :   N/A   Sitting to Supine :   N/A   Sitting to Standing :   Contact guard   Standing to Sitting :   Contact guard   Bed to Commode :   N/A   Commode to Bed :   N/A   Bed to Wheelchair :   N/A   Wheelchair to Bed :   N/A   Wheelchair Mobility :   N/A   Verna Recinos - 11/2/2020 11:41    Gait Grid   Assistive Device :    Rolling walker - standard   Rolling walker - standard           Distance :    250 ft   20 ft           Level of Assistance :    SBA   SBA           Weight Bearing Status :    Full weight bearing   Full weight bearing           Location :    Other: CCU   Bedside             Verna Recinos - 11/2/2020 11:41   Verna Recinos - 11/2/2020 11:41         Gait Analysis :   Pt demonstrated no major loss ofbalance during ambulation. Pt demonstrated a recipical gait   Verna Recinos - 11/2/2020 11:41    PT Stairs Grid   Number of Stairs :    5               Railing Use :    Both sides              Pattern :    Step-to-step              Level of Assistance :    SBA              Assistive Device :    Bilateral rail support              Weight-Bearing Status :    Full weight bearing                Verna Recinos - 11/2/2020 11:41          Endurance/Activity Level :   Fair   CVA Patient :   No   Verna Recinos Krish 11/2/2020 11:41    Treatments and Interventions   Treatments and Interventions Checklist :   Therapeutic transfer training, Bed mobility, Energy conservation, Gait training, Stairs/stoop training   Patient/Family Education :   Yes   Verna Recinos Krish 11/2/2020 11:41    Education   Preferred Communication Mode :    Written   Language for Written Material :   English   Verna Recinos - 11/2/2020 11:41    Assessment and Goals   Assessment :   Patient is a 59 y/o female admitted for NSTEMI, CP and epigastric pain 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 Extubated 10/28/20. PMH significant for HTN, RTC repair and smoker. Rn cleared. Pt was sitting in bedside recliner upon arrival for therapy session. Pt c/o denies having any pain during therapy session. Pt was pleasant and cooperative with therapy session. Pt was educated on hand placement and deep breathing techinques and  walker placement during therapy session. Pt is progressing towards therapy goals. Pt was placed sitting in bedside recliner with all needs within reach, rn notified.    Patient will need nondaily skilled therapy with 24hr nonskilled assist with mobility/ADLs upon DC.     Progressive mobility recommendation: chair all meals and amb in quinn 4x/day with staff assist x1, GB and walker    AM-PAC 6 clicks for Mobility: Raw score - 17/24, Standardized T score - 39.67    treatment time: 40 mintues  ta-18 minutes  gt- 12 minutes       Rehab Potential :   Fair   Follow-Up PT Recommendations :   To be determined   Additional PT Recommendations :   Patient will need nondaily skilled therapy with 24hr nonskilled assist with mobility/ADLs upon DC.    Verna Recinos - 11/2/2020 11:41    PT Goals Grid   Problem :    transfers   gait   stairs        Patient will... :    demo sit<>stand and transfer with RW and Mod I to promote OOB activities   amb at least 150ft with RW and Mod I to promote amb   negotiate up/down 7 steps with 1HR and SBA in prep for home return        Timeframe :    2-3 days   2-3 days   2-3 days          Verna Recinos - 11/2/2020 11:41   Verna Recinos - 11/2/2020 11:41   Verna Recinos - 11/2/2020 11:41        PT Plan/Recommendations :   Therapeutic exercise, Bed mobility, Transfer training, Gait training, Endurance training, Education of patient/family   Frequency :    Daily x 7 days per week   Location of PT Intervention :   Bedside, Department, Other: CCU   Home Exercise Program :   To be determined   PT Care Plan Discussed With :   Patient   Equipment Needed? :   Recommended   Recommended Equipment :   Assistive device   Recommended Assistive Device :   Wheeled walker   Additional Referral Recommendations :   Verna Herzog - 11/2/2020 11:41            no

## 2021-08-16 NOTE — ED PROVIDER NOTE - CARE PROVIDER_API CALL
Yandel Rubio)  Internal Medicine  [address] [place] [address], [address]  [place] [address]  Phone: [phone]  Fax: [phone]  Follow Up Time: Yes

## 2022-04-11 NOTE — DISCHARGE NOTE ADULT - VISION (WITH CORRECTIVE LENSES IF THE PATIENT USUALLY WEARS THEM):
----- Message from Moe Andino morena note.
Home number is not in service.
Normal vision: sees adequately in most situations; can see medication labels, newsprint

## 2022-09-08 NOTE — PATIENT PROFILE ADULT. - SUPPORT PERSON NAME
(2) Slight disablitiy.  Able to look after own affairs without assistance, but unable to carry out all previous activities. Radha Gonzalez

## 2023-03-31 NOTE — DISCHARGE NOTE PROVIDER - NSDCQMCOGNITION_NEU_ALL_CORE
This is a pleasant 63-year-old who presents for urine symptoms.  Her symptoms started about 4 days ago when she noticed an odor to her urine.  She also has noted some urgency.  She had a pink tinge 1 day.  Her last UTI was around age 19.  No vaginal symptoms.  No fevers.  No GI symptoms.  No back pain.  She is otherwise felt fine.    Past Medical History:   Diagnosis Date     Atrophic vaginitis      Benign essential HTN 2017    added lisinopril then stopped  due to leg cramps and changed to norvasc     H/O colonoscopy 2013    normal, also nl      Menorrhagia      Past Surgical History:   Procedure Laterality Date     AS REVISE TOTAL HIP REPLACEMENT Right 2018      SECTION  1987     GYN SURGERY      HerOption, ablation     Social History     Socioeconomic History     Marital status:      Spouse name: Not on file     Number of children: 2     Years of education: Not on file     Highest education level: Not on file   Occupational History     Occupation: homemaker   Tobacco Use     Smoking status: Never     Smokeless tobacco: Never   Substance and Sexual Activity     Alcohol use: Yes     Alcohol/week: 0.0 standard drinks     Comment: few drinks per week     Drug use: No     Sexual activity: Yes     Partners: Male     Birth control/protection: Post-menopausal   Other Topics Concern     Not on file   Social History Narrative     Not on file     Social Determinants of Health     Financial Resource Strain: Not on file   Food Insecurity: Not on file   Transportation Needs: Not on file   Physical Activity: Not on file   Stress: Not on file   Social Connections: Not on file   Intimate Partner Violence: Not on file   Housing Stability: Not on file     Current Outpatient Medications   Medication Sig Dispense Refill     acetaminophen (TYLENOL) 500 MG tablet Take 500-1,000 mg by mouth every 6 hours as needed for mild pain       amLODIPine (NORVASC) 5 MG tablet Take 1 tablet (5 mg) by mouth daily 90  "tablet 3     ibuprofen (ADVIL/MOTRIN) 200 MG tablet Take 4 tablets (800 mg) by mouth every morning Reported on 2/21/2017       Multiple Vitamins-Minerals (WOMENS MULTIVITAMIN PO) Take 1 tablet by mouth daily Vit A fusion       nitroFURantoin macrocrystal-monohydrate (MACROBID) 100 MG capsule Take 1 capsule (100 mg) by mouth 2 times daily for 5 days 10 capsule 0     No Known Allergies  FAMILY HISTORY NOTED AND REVIEWED    REVIEW OF SYSTEMS: above    PHYSICAL EXAM    /86   Pulse 75   Temp 97.7  F (36.5  C) (Temporal)   Resp 16   Ht 1.638 m (5' 4.5\")   Wt 64.4 kg (142 lb)   SpO2 97%   BMI 24.00 kg/m      Patient appears non toxic  Abdomen normal active bowel sounds, soft non-tender, no mgr, no hepatosplenomegaly    Stat urine noted    ASSESSMENT:  Uncomplicated uti    PLAN:  macrobid bid for 5 days, call if symptoms worsens or not resolving soon.    Milton Lyle M.D.      " No difficulties

## 2023-07-27 NOTE — PATIENT PROFILE ADULT - FALL HARM RISK TYPE OF ASSESSMENT
PLEASE ASSIST             Appointment For: Kenny Scanlon ([id number])  Visit Type: PC ACUTE ([id number])     7/28/2023   11:00 AM  20 mins.  Lupe Ramirez MD ADM[address]     Patient Comments:  Dizziness     admission

## 2023-11-17 NOTE — PATIENT PROFILE ADULT - DISASTER - NSASFALLATTEMPTOOB_GEN_A_NUR
Writer LUCIANO offering apt for [email or web address] pm or 12/01. Provided office number to call back for scheduling apt. Please assist upon return call. TY no

## 2024-01-01 NOTE — PATIENT PROFILE ADULT - NSASFALLNEEDSASSIST_GEN_A_NUR
medium/normal/dry and intact medium/normal/dry and intact medium/normal/dry and intact medium medium/normal medium/normal medium yes

## 2024-04-15 NOTE — ED PROVIDER NOTE - TOBACCO USE
No protocol for requested medication.    Medication:      Disp Refills Start End    LORazepam (ATIVAN) 1 MG tablet 15 tablet 0 2024 --    Si.5 or 1 tab po qd prn    Sent to pharmacy as: LORazepam 1 MG Oral Tablet (ATIVAN)    Class: Eprescribe    E-Prescribing Status: Receipt confirmed by pharmacy (2024 10:06 AM CDT)      Last office visit date: 2022  Appointment: 2024  Last refill: 2024 (15 day supply) per PDMP  Is the patient due for refill of this medication(s): Yes  PDMP review: Criteria met. Forwarded to Physician/STEFANIE for signature.   Pharm[id number] [place] [facility] [address]    Order pended, routed to clinician for review.    Never smoker

## 2025-03-13 NOTE — ED ADULT NURSE NOTE - IN THE PAST YEAR, HOW OFTEN HAVE YOU USED TOBACCO PRODUCTS?
Health Call Center    Phone Message    May a detailed message be left on voicemail: yes     Reason for Call: Symptoms or Concerns     If patient has red-flag symptoms, warm transfer to triage line    Current symptom or concern: Palpitations, irregular heart beat, dizziness and SOB off and on every couple of hours    Symptoms have been present for:  2 day(s)    Has patient previously been seen for this? Yes    By : Dr Rivas    Date: 3/5/25    Are there any new or worsening symptoms? Yes:     Action Taken: Other: cardio    Travel Screening: Not Applicable    Thank you!  [facility]       Date of Service:                                                                     
Called and spoke to patient.  She reports that over the last 48 hours her palpitations have been getting worse.  She feels that they come in waves, worse for a couple of hours and then better for a couple hours.  She describes the papitation as a lightheaded feeling, then a pause followed by a big palpitation that lasts 2-3 seconds.  She reports that the SOB is virtually gone.  Her incision looks good.  She ended up not needing the colchicine and has been taking ibuprofen. I let her know that I would reach out to Dr. Rivas.      Per Dr. Rivas, he would like a 7-day Zio Patch.  He thinks that she may be having intermittent junctional rhythm with retrograde atrial activation. She already has a ZioPatch at home so she is going to put that on and send it in after 7 days.  She will call or message if there are any changes in symptoms.    Andrew Post RN BSN  Cardiology Nurse Coordinator    
Never